# Patient Record
Sex: FEMALE | Race: WHITE | NOT HISPANIC OR LATINO | Employment: FULL TIME | URBAN - METROPOLITAN AREA
[De-identification: names, ages, dates, MRNs, and addresses within clinical notes are randomized per-mention and may not be internally consistent; named-entity substitution may affect disease eponyms.]

---

## 2017-01-03 ENCOUNTER — PATIENT MESSAGE (OUTPATIENT)
Dept: FAMILY MEDICINE | Facility: CLINIC | Age: 52
End: 2017-01-03

## 2017-03-21 RX ORDER — DICYCLOMINE HYDROCHLORIDE 20 MG/1
20 TABLET ORAL 4 TIMES DAILY
Qty: 120 TABLET | Refills: 0 | Status: SHIPPED | OUTPATIENT
Start: 2017-03-21 | End: 2017-07-26 | Stop reason: SDUPTHER

## 2017-03-21 NOTE — TELEPHONE ENCOUNTER
----- Message from Jennifer Teague sent at 3/21/2017 12:39 PM CDT -----  Contact: self  Patient called regarding refill of medication. Stating the pharmacy submitted a request last week with no response. The patient is currently out of meds. Please contact [phone] (home)     BENTYL 20 mg tablet    [facility] [address] [place] [address] AT [facility] [address]  [address] [place] [address]  Phone: [phone] Fax: [phone]

## 2017-03-21 NOTE — TELEPHONE ENCOUNTER
Her pharmacy did not send refill request. This is the first one we have received. Refill sent to pharmacy.

## 2017-05-25 ENCOUNTER — OFFICE VISIT (OUTPATIENT)
Dept: FAMILY MEDICINE | Facility: CLINIC | Age: 52
End: 2017-05-25
Payer: COMMERCIAL

## 2017-05-25 VITALS
RESPIRATION RATE: 18 BRPM | BODY MASS INDEX: 27.85 KG/M2 | DIASTOLIC BLOOD PRESSURE: 60 MMHG | TEMPERATURE: 98 F | SYSTOLIC BLOOD PRESSURE: 98 MMHG | WEIGHT: 163.13 LBS | HEIGHT: 64 IN | HEART RATE: 72 BPM

## 2017-05-25 DIAGNOSIS — J01.00 ACUTE MAXILLARY SINUSITIS, RECURRENCE NOT SPECIFIED: Primary | ICD-10-CM

## 2017-05-25 DIAGNOSIS — K21.9 GASTROESOPHAGEAL REFLUX DISEASE, ESOPHAGITIS PRESENCE NOT SPECIFIED: ICD-10-CM

## 2017-05-25 PROCEDURE — 99214 OFFICE O/P EST MOD 30 MIN: CPT | Mod: S$GLB,,, | Performed by: NURSE PRACTITIONER

## 2017-05-25 RX ORDER — ESOMEPRAZOLE MAGNESIUM 40 MG/1
40 CAPSULE, DELAYED RELEASE ORAL
Qty: 30 CAPSULE | Refills: 6 | Status: SHIPPED | OUTPATIENT
Start: 2017-05-25 | End: 2017-06-20

## 2017-05-25 RX ORDER — AZITHROMYCIN 250 MG/1
TABLET, FILM COATED ORAL
Qty: 6 TABLET | Refills: 0 | Status: SHIPPED | OUTPATIENT
Start: 2017-05-25 | End: 2017-11-22 | Stop reason: ALTCHOICE

## 2017-05-25 NOTE — PROGRESS NOTES
"Subjective:       Patient ID: Lavern Hodges is a 51 y.o. female.    Chief Complaint: No chief complaint on file.    HPI here for symptoms of sinus. Onset over a week. Was using nasoqort and it was feeling better but now symptoms are bad again. Taking advil. See ROS.    She has tried several different GERD medications. She has seen  GI.  She finds that only the nexium  40mg keeps her symptoms controlled. She would like to try and PA from insurance approved. Will send to pharmacy.     The following portion of the patients history was reviewed and updated as appropriate: allergies, current medications, past medical and surgical history. Past social history and problem list reviewed. Family PMH and Past social history reviewed. Tobacco, Illicit drug use reviewed.     Review of Systems   Constitutional: Positive for fatigue and fever (flushing. ). Negative for chills.   HENT: Positive for congestion, postnasal drip, rhinorrhea, sinus pressure, sneezing (started with sneezing, that is better) and sore throat.    Respiratory: Negative for cough, chest tightness and wheezing.    Cardiovascular: Negative for chest pain and palpitations.   Gastrointestinal: Positive for abdominal pain, diarrhea (had diarrhea last week, that is resolved) and nausea. Negative for vomiting.   Musculoskeletal: Positive for myalgias.   Skin: Negative for rash.   Neurological: Negative for dizziness, weakness and headaches.       Objective:     BP 98/60 (BP Location: Left arm, Patient Position: Sitting, BP Method: Manual)   Pulse 72   Temp 98.3 °F (36.8 °C) (Oral)   Resp 18   Ht 5' 4" (1.626 m)   Wt 74 kg (163 lb 2.3 oz)   LMP 05/11/2017   BMI 28.00 kg/m²      Physical Exam  Constitutional:  well-developed and well-nourished. Oriented to Person, place and time.  Head: Normocephalic.   Ears: Canals without erythema or cerumen impactions. Mild air and /fluid levels. No bulging bilaterally.  Nose: Rhinorrhea and sinus tenderness present over " maxillary sinus area.   Mouth/Throat: Uvula is midline and mucous membranes are normal. Posterior oropharyngeal erythema present. PND to posterior pharynx.   Eyes: Conjunctivae are normal. Pupils are equal, round, and reactive to light.   Neck: Normal range of motion. Neck supple. mild inflammation and tenderness to anterior cervical lymph nodes  Cardiovascular: Normal rate and regular rhythm.  No murmurs or gallops.   Pulmonary/Chest: Effort normal and breath sounds normal.  no wheezing or rales.   Musculoskeletal: Normal range of motion. gait and coordination normal.   Assessment:       1. Acute maxillary sinusitis, recurrence not specified    2. Gastroesophageal reflux disease, esophagitis presence not specified        Plan:         Lavern was seen today for sinusitis and sore throat.    Diagnoses and all orders for this visit:    Acute maxillary sinusitis, recurrence not specified: due to duration, reoccurance and presenting exam will cover with antibiotics. Take as directed. Needs to use nasaqort and claritin daily.    Gastroesophageal reflux disease, esophagitis presence not specified  -     esomeprazole (NEXIUM) 40 MG capsule; Take 1 capsule (40 mg total) by mouth before breakfast. Brand name only    Other orders  -     azithromycin (ZITHROMAX Z-ANJEL) 250 MG tablet; Two tablets today then one daily until complete    Continue current medication  Take medications only as prescribed  Healthy diet  Adequate rest  Adequate hydration  Avoid allergens  Avoid excessive caffeine

## 2017-05-30 ENCOUNTER — TELEPHONE (OUTPATIENT)
Dept: FAMILY MEDICINE | Facility: CLINIC | Age: 52
End: 2017-05-30

## 2017-05-30 NOTE — TELEPHONE ENCOUNTER
Received from [facility] message that the pt's ins plan does not cover Nexium.   Alternative med is Pantoprazole.

## 2017-05-31 NOTE — TELEPHONE ENCOUNTER
Called insurance to Initiat PA for rx Nexium 40 mg.   Per insurance the nexium does not need priro auth at this time, not on formulary, not even for PA.  Alternative pantoprazole.  [facility] informed.  PharmacistBelem, also trying to  Run the generic or She can fill the Pantoprazole?

## 2017-06-01 ENCOUNTER — TELEPHONE (OUTPATIENT)
Dept: FAMILY MEDICINE | Facility: CLINIC | Age: 52
End: 2017-06-01

## 2017-06-01 NOTE — TELEPHONE ENCOUNTER
Spoke w/ pt, has been taking OTC Nexium 20 mg 2 tablets. Pt aware that insurance will not cover brand name Nexium, Co-pay for generic Nexium is $ 55. Pt states that she was told that if she tried and failed on 2 other rx , they would cover Nexium. Pt has tried and failed on Prevacid and Protonix . [facility] to fax PA info on Brand name Nexium. Pt aware that PA process takes 24-72 hrs--lp

## 2017-06-01 NOTE — TELEPHONE ENCOUNTER
Called pt, no answer. Left message on voicemail with clinic # to call back in regards to her pa for nexium.

## 2017-06-01 NOTE — TELEPHONE ENCOUNTER
----- Message from Zac Sherman sent at 5/31/2017  4:18 PM CDT -----  Contact: belem with [facility]  Belem with [facility] called to advise that script request was written for brand only and pharmacist can't process it for generic. Please call back at [phone]. thanks,

## 2017-06-20 RX ORDER — CLONAZEPAM 0.5 MG/1
0.5 TABLET ORAL NIGHTLY PRN
Qty: 20 TABLET | Refills: 2 | Status: SHIPPED | OUTPATIENT
Start: 2017-06-20 | End: 2019-07-08 | Stop reason: ALTCHOICE

## 2017-06-20 RX ORDER — OMEPRAZOLE 40 MG/1
40 CAPSULE, DELAYED RELEASE ORAL DAILY
Qty: 30 CAPSULE | Refills: 11 | Status: SHIPPED | OUTPATIENT
Start: 2017-06-20 | End: 2017-11-22

## 2017-06-20 RX ORDER — VALACYCLOVIR HYDROCHLORIDE 500 MG/1
500 TABLET, FILM COATED ORAL 2 TIMES DAILY
Qty: 20 TABLET | Refills: 11 | Status: SHIPPED | OUTPATIENT
Start: 2017-06-20 | End: 2019-07-10 | Stop reason: SDUPTHER

## 2017-06-20 NOTE — TELEPHONE ENCOUNTER
Pt stated she has a blister on mouth and itches,  Requesting something to be called in to pharmacy.   Pt stated she thinks this is related to stress.  Requesting also refill on Klonopin.   3rd pt stated the aciphex and upsets her stomach.  She is questioning if you can call in alternative to Nexium(ins not covering), questioning prilosec.

## 2017-06-20 NOTE — TELEPHONE ENCOUNTER
----- Message from Mi Falk sent at 2017 12:57 PM CDT -----  Contact: Lavern  Patient is requesting refill Rx Klonopin (Rx ) and also asking for a Rx for mouth sores    [facility] [address] [place] [id number] [facility] [address] AT [facility] [place] [address]  [id number] [place] [address]  Phone: [phone] Fax: [phone]

## 2017-07-13 ENCOUNTER — TELEPHONE (OUTPATIENT)
Dept: FAMILY MEDICINE | Facility: CLINIC | Age: 52
End: 2017-07-13

## 2017-07-13 NOTE — TELEPHONE ENCOUNTER
----- Message from Alycia Santizo sent at 7/13/2017 12:41 PM CDT -----  Contact: self  Patient called asking for advice about stomach pain and her medication.  Please call patient at [phone]. Thanks!

## 2017-07-14 ENCOUNTER — TELEPHONE (OUTPATIENT)
Dept: FAMILY MEDICINE | Facility: CLINIC | Age: 52
End: 2017-07-14

## 2017-07-14 NOTE — TELEPHONE ENCOUNTER
Spoke to pt.   She has complaints of stomach issues, does have medication for IBS.  Pt stated she has jury duty Monday and that is stressing her out, making IBS worse, stomach cramping.    Pt requesting letter to give at Jury duty to excuse her.    Per Brandyn Webb verbal, no jury duty letters given unless pt is disabled.  Pt verbalized understanding.

## 2017-07-14 NOTE — TELEPHONE ENCOUNTER
----- Message from Brenda Falk sent at 7/14/2017 10:52 AM CDT -----  Contact: PAtient  Patient is returning your call. Please call her at [phone].

## 2017-07-14 NOTE — TELEPHONE ENCOUNTER
----- Message from Joselin Chong sent at 7/14/2017 12:34 PM CDT -----  Pt returning call / call [phone]

## 2017-07-27 RX ORDER — DICYCLOMINE HYDROCHLORIDE 20 MG/1
TABLET ORAL
Qty: 120 TABLET | Refills: 0 | Status: SHIPPED | OUTPATIENT
Start: 2017-07-27 | End: 2017-11-22 | Stop reason: SDUPTHER

## 2017-10-31 ENCOUNTER — TELEPHONE (OUTPATIENT)
Dept: FAMILY MEDICINE | Facility: CLINIC | Age: 52
End: 2017-10-31

## 2017-10-31 NOTE — TELEPHONE ENCOUNTER
----- Message from José Miguel Ibanez sent at 10/31/2017  3:17 PM CDT -----  Contact: Patient  Patient states that she would like to come in the morning, sometime this week, for a Flu Shot and to please call her back at [phone].  Thank you

## 2017-11-03 ENCOUNTER — IMMUNIZATION (OUTPATIENT)
Dept: FAMILY MEDICINE | Facility: CLINIC | Age: 52
End: 2017-11-03
Payer: COMMERCIAL

## 2017-11-03 PROCEDURE — 90471 IMMUNIZATION ADMIN: CPT | Mod: S$GLB,,, | Performed by: NURSE PRACTITIONER

## 2017-11-03 PROCEDURE — 90686 IIV4 VACC NO PRSV 0.5 ML IM: CPT | Mod: S$GLB,,, | Performed by: NURSE PRACTITIONER

## 2017-11-22 ENCOUNTER — HOSPITAL ENCOUNTER (OUTPATIENT)
Dept: RADIOLOGY | Facility: HOSPITAL | Age: 52
Discharge: HOME | End: 2017-11-22
Attending: NURSE PRACTITIONER
Payer: COMMERCIAL

## 2017-11-22 ENCOUNTER — OFFICE VISIT (OUTPATIENT)
Dept: FAMILY MEDICINE | Facility: CLINIC | Age: 52
End: 2017-11-22
Payer: COMMERCIAL

## 2017-11-22 VITALS
DIASTOLIC BLOOD PRESSURE: 70 MMHG | HEART RATE: 84 BPM | TEMPERATURE: 98 F | BODY MASS INDEX: 28 KG/M2 | SYSTOLIC BLOOD PRESSURE: 132 MMHG | HEIGHT: 64 IN | WEIGHT: 164 LBS

## 2017-11-22 DIAGNOSIS — M54.2 NECK PAIN: ICD-10-CM
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
## 2017-11-22 DIAGNOSIS — S46.812A STRAIN OF LEFT TRAPEZIUS MUSCLE, INITIAL ENCOUNTER: ICD-10-CM

## 2017-11-22 DIAGNOSIS — R29.898 UPPER EXTREMITY WEAKNESS: ICD-10-CM

## 2017-11-22 PROCEDURE — 99214 OFFICE O/P EST MOD 30 MIN: CPT | Mod: S$GLB,,, | Performed by: NURSE PRACTITIONER

## 2017-11-22 PROCEDURE — 72040 X-RAY EXAM NECK SPINE 2-3 VW: CPT | Mod: 26,,, | Performed by: RADIOLOGY

## 2017-11-22 PROCEDURE — 72040 X-RAY EXAM NECK SPINE 2-3 VW: CPT | Mod: TC,PO

## 2017-11-22 RX ORDER — HYDROGEN PEROXIDE 3 %
20 SOLUTION, NON-ORAL MISCELLANEOUS
COMMUNITY
End: 2018-05-24 | Stop reason: SDUPTHER

## 2017-11-22 RX ORDER — METHOCARBAMOL 500 MG/1
500 TABLET, FILM COATED ORAL 3 TIMES DAILY
Qty: 30 TABLET | Refills: 0 | Status: SHIPPED | OUTPATIENT
Start: 2017-11-22 | End: 2017-12-02

## 2017-11-22 RX ORDER — RABEPRAZOLE SODIUM 20 MG/1
TABLET, DELAYED RELEASE ORAL
Refills: 1 | COMMUNITY
Start: 2017-10-30 | End: 2017-11-22

## 2017-11-22 RX ORDER — DICYCLOMINE HYDROCHLORIDE 20 MG/1
TABLET ORAL
Qty: 120 TABLET | Refills: 0 | Status: SHIPPED | OUTPATIENT
Start: 2017-11-22 | End: 2018-04-05 | Stop reason: SDUPTHER

## 2017-11-22 RX ORDER — KETOROLAC TROMETHAMINE 10 MG/1
10 TABLET, FILM COATED ORAL 3 TIMES DAILY
Qty: 15 TABLET | Refills: 0 | Status: SHIPPED | OUTPATIENT
Start: 2017-11-22 | End: 2017-11-27
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
## 2017-12-05 ENCOUNTER — TELEPHONE (OUTPATIENT)
Dept: FAMILY MEDICINE | Facility: CLINIC | Age: 52
End: 2017-12-05

## 2017-12-05 RX ORDER — AZITHROMYCIN 250 MG/1
TABLET, FILM COATED ORAL
Qty: 6 TABLET | Refills: 0 | Status: SHIPPED | OUTPATIENT
Start: 2017-12-05 | End: 2017-12-10 | Stop reason: ALTCHOICE

## 2017-12-06 ENCOUNTER — TELEPHONE (OUTPATIENT)
Dept: FAMILY MEDICINE | Facility: CLINIC | Age: 52
End: 2017-12-06

## 2017-12-06 RX ORDER — ALBUTEROL SULFATE 90 UG/1
2 AEROSOL, METERED RESPIRATORY (INHALATION) EVERY 6 HOURS PRN
Qty: 18 G | Refills: 0 | Status: SHIPPED | OUTPATIENT
Start: 2017-12-06 | End: 2018-12-24

## 2017-12-06 NOTE — TELEPHONE ENCOUNTER
----- Message from Hang JAIMES Lubna sent at 12/6/2017  8:17 AM CST -----  Contact: same  Patient called in and wanted to see if her Rx for Albuterol Inhaler.  Patient stated that she has not had to use it in awhile but wanted to see if she could get a Rx sent over to the following pharmacy.      [facility] [address] [place] [address] [id number] [place] [address] AT [facility] [address]  [address] [place] [id number]  Phone: [phone] Fax: [phone]    Patient call back number is [phone]

## 2017-12-10 ENCOUNTER — OFFICE VISIT (OUTPATIENT)
Dept: URGENT CARE | Facility: CLINIC | Age: 52
End: 2017-12-10
Payer: COMMERCIAL

## 2017-12-10 VITALS
HEIGHT: 64 IN | DIASTOLIC BLOOD PRESSURE: 78 MMHG | SYSTOLIC BLOOD PRESSURE: 120 MMHG | RESPIRATION RATE: 16 BRPM | HEART RATE: 94 BPM | TEMPERATURE: 99 F | WEIGHT: 159 LBS | OXYGEN SATURATION: 98 % | BODY MASS INDEX: 27.14 KG/M2

## 2017-12-10 DIAGNOSIS — J32.9 RHINOSINUSITIS: Primary | ICD-10-CM

## 2017-12-10 PROCEDURE — 99213 OFFICE O/P EST LOW 20 MIN: CPT | Mod: 25,S$GLB,, | Performed by: FAMILY MEDICINE

## 2017-12-10 PROCEDURE — 96372 THER/PROPH/DIAG INJ SC/IM: CPT | Mod: S$GLB,,, | Performed by: FAMILY MEDICINE

## 2017-12-10 RX ORDER — BETAMETHASONE SODIUM PHOSPHATE AND BETAMETHASONE ACETATE 3; 3 MG/ML; MG/ML
9 INJECTION, SUSPENSION INTRA-ARTICULAR; INTRALESIONAL; INTRAMUSCULAR; SOFT TISSUE
Status: COMPLETED | OUTPATIENT
Start: 2017-12-10 | End: 2017-12-10

## 2017-12-10 RX ORDER — GUAIFENESIN 100 MG/5ML
200 SOLUTION ORAL 3 TIMES DAILY PRN
COMMUNITY
End: 2017-12-10 | Stop reason: ALTCHOICE

## 2017-12-10 RX ADMIN — BETAMETHASONE SODIUM PHOSPHATE AND BETAMETHASONE ACETATE 9 MG: 3; 3 INJECTION, SUSPENSION INTRA-ARTICULAR; INTRALESIONAL; INTRAMUSCULAR; SOFT TISSUE at 11:12

## 2017-12-10 NOTE — PATIENT INSTRUCTIONS
-   If no improvement, worsening, and/or persistent temp > 100.4, seek medical attention or follow-up with PCP.  -   Tylenol/Ibuprofen as needed  -   Mucinex-D as needed

## 2017-12-10 NOTE — PROGRESS NOTES
"Subjective:       Patient ID: Lavern Hodges is a 52 y.o. female.    Vitals:  height is 5' 4" (1.626 m) and weight is 72.1 kg (159 lb). Her temperature is 98.7 °F (37.1 °C). Her blood pressure is 120/78 and her pulse is 94. Her respiration is 16 and oxygen saturation is 98%.     Chief Complaint: Nasal Congestion and Cough    Cough   This is a new problem. The current episode started in the past 7 days. The problem has been gradually worsening. Associated symptoms include nasal congestion. Pertinent negatives include no chest pain, chills, ear pain, eye redness, fever, headaches, myalgias, sore throat, shortness of breath or wheezing.     Review of Systems   Constitution: Negative for chills, fever and malaise/fatigue.   HENT: Positive for congestion. Negative for ear pain, hoarse voice and sore throat.    Eyes: Negative for discharge and redness.   Cardiovascular: Negative for chest pain, dyspnea on exertion and leg swelling.   Respiratory: Positive for cough. Negative for shortness of breath, sputum production and wheezing.    Musculoskeletal: Negative for myalgias.   Gastrointestinal: Positive for diarrhea. Negative for abdominal pain and nausea.   Neurological: Negative for headaches.       Objective:      Physical Exam   Constitutional: She appears well-developed and well-nourished. She is cooperative.  Non-toxic appearance. She does not appear ill. No distress.   HENT:   Head: Normocephalic and atraumatic.   Right Ear: Hearing, tympanic membrane, external ear and ear canal normal.   Left Ear: Hearing, tympanic membrane, external ear and ear canal normal.   Nose: Mucosal edema present. No rhinorrhea or nasal deformity. No epistaxis. Right sinus exhibits no maxillary sinus tenderness and no frontal sinus tenderness. Left sinus exhibits no maxillary sinus tenderness and no frontal sinus tenderness.   Mouth/Throat: Uvula is midline, oropharynx is clear and moist and mucous membranes are normal. No trismus in the jaw. " Normal dentition. No uvula swelling. No posterior oropharyngeal erythema.   Eyes: Conjunctivae and lids are normal. No scleral icterus.   Sclera clear bilat   Neck: Trachea normal, full passive range of motion without pain and phonation normal. Neck supple.   Cardiovascular: Normal rate, regular rhythm, normal heart sounds, intact distal pulses and normal pulses.    Pulmonary/Chest: Effort normal and breath sounds normal. No respiratory distress.   Abdominal: Normal appearance.   Neurological: She is alert. She exhibits normal muscle tone.   Skin: Skin is warm and intact. She is not diaphoretic.   Psychiatric: She has a normal mood and affect. Her speech is normal and behavior is normal. Cognition and memory are normal.   Nursing note and vitals reviewed.      Assessment:       1. Rhinosinusitis        Plan:         Rhinosinusitis  -     betamethasone acetate-betamethasone sodium phosphate injection 9 mg; Inject 1.5 mLs (9 mg total) into the muscle one time.        -      Mucinex-D PRN

## 2017-12-14 ENCOUNTER — OFFICE VISIT (OUTPATIENT)
Dept: FAMILY MEDICINE | Facility: CLINIC | Age: 52
End: 2017-12-14
Payer: COMMERCIAL

## 2017-12-14 ENCOUNTER — TELEPHONE (OUTPATIENT)
Dept: FAMILY MEDICINE | Facility: CLINIC | Age: 52
End: 2017-12-14

## 2017-12-14 ENCOUNTER — LAB VISIT (OUTPATIENT)
Dept: LAB | Facility: HOSPITAL | Age: 52
End: 2017-12-14
Attending: NURSE PRACTITIONER
Payer: COMMERCIAL

## 2017-12-14 VITALS
RESPIRATION RATE: 16 BRPM | HEART RATE: 85 BPM | OXYGEN SATURATION: 98 % | BODY MASS INDEX: 28.07 KG/M2 | TEMPERATURE: 98 F | SYSTOLIC BLOOD PRESSURE: 108 MMHG | WEIGHT: 164.44 LBS | DIASTOLIC BLOOD PRESSURE: 70 MMHG | HEIGHT: 64 IN

## 2017-12-14 DIAGNOSIS — J01.01 ACUTE RECURRENT MAXILLARY SINUSITIS: Primary | ICD-10-CM

## 2017-12-14 DIAGNOSIS — D50.9 IRON DEFICIENCY ANEMIA, UNSPECIFIED IRON DEFICIENCY ANEMIA TYPE: ICD-10-CM

## 2017-12-14 DIAGNOSIS — R53.83 FATIGUE, UNSPECIFIED TYPE: ICD-10-CM

## 2017-12-14 DIAGNOSIS — J20.9 BRONCHITIS WITH BRONCHOSPASM: ICD-10-CM

## 2017-12-14 LAB
FERRITIN SERPL-MCNC: 32 NG/ML
IRON SERPL-MCNC: 64 UG/DL
IRON SERPL-MCNC: 64 UG/DL
SATURATED IRON: 15 %
TOTAL IRON BINDING CAPACITY: 414 UG/DL
TRANSFERRIN SERPL-MCNC: 280 MG/DL
TSH SERPL DL<=0.005 MIU/L-ACNC: 1.01 UIU/ML
VIT B12 SERPL-MCNC: 259 PG/ML

## 2017-12-14 PROCEDURE — 99214 OFFICE O/P EST MOD 30 MIN: CPT | Mod: S$GLB,,, | Performed by: NURSE PRACTITIONER

## 2017-12-14 PROCEDURE — 82607 VITAMIN B-12: CPT

## 2017-12-14 PROCEDURE — 83540 ASSAY OF IRON: CPT

## 2017-12-14 PROCEDURE — 36415 COLL VENOUS BLD VENIPUNCTURE: CPT | Mod: PO

## 2017-12-14 PROCEDURE — 84443 ASSAY THYROID STIM HORMONE: CPT

## 2017-12-14 PROCEDURE — 82728 ASSAY OF FERRITIN: CPT

## 2017-12-14 RX ORDER — PREDNISONE 20 MG/1
TABLET ORAL
Qty: 11 TABLET | Refills: 0 | Status: SHIPPED | OUTPATIENT
Start: 2017-12-14 | End: 2018-05-01

## 2017-12-14 RX ORDER — LEVOFLOXACIN 500 MG/1
500 TABLET, FILM COATED ORAL DAILY
Qty: 10 TABLET | Refills: 0 | Status: SHIPPED | OUTPATIENT
Start: 2017-12-14 | End: 2017-12-24

## 2017-12-14 RX ORDER — PREDNISONE 20 MG/1
TABLET ORAL
Qty: 11 TABLET | Refills: 0 | Status: SHIPPED | OUTPATIENT
Start: 2017-12-14 | End: 2017-12-14 | Stop reason: SDUPTHER
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
## 2017-12-14 NOTE — TELEPHONE ENCOUNTER
----- Message from Marcia Gonzalez sent at 12/14/2017 10:25 AM CST -----  Verification on Rx Prednisone 20 mg.  Please call [facility]/ at [phone]

## 2017-12-14 NOTE — TELEPHONE ENCOUNTER
Per pharmacist (Sissy), please clarify how many tablets patient takes on days 5-6.  Script reads: 2 tablets x4 days, ???  x2 days, then ½ tab x2 days.

## 2017-12-19 ENCOUNTER — TELEPHONE (OUTPATIENT)
Dept: FAMILY MEDICINE | Facility: CLINIC | Age: 52
End: 2017-12-19

## 2017-12-19 NOTE — TELEPHONE ENCOUNTER
Called pt; no answer. Left message on machine with clinic call back # for return call regarding recent lab results.

## 2017-12-19 NOTE — TELEPHONE ENCOUNTER
----- Message from Lisha Prabhakar sent at 12/19/2017 12:59 PM CST -----  Patient is returning nurse's (Sol) call/please call patient back at [phone].

## 2017-12-19 NOTE — TELEPHONE ENCOUNTER
----- Message from Gerda Webb NP sent at 12/15/2017  4:14 PM CST -----  Saturated iron is a little better at 15.  Ferritin is in normal range at 32.  That is better.

## 2018-01-09 ENCOUNTER — TELEPHONE (OUTPATIENT)
Dept: FAMILY MEDICINE | Facility: CLINIC | Age: 53
End: 2018-01-09
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
## 2018-01-10 ENCOUNTER — TELEPHONE (OUTPATIENT)
Dept: FAMILY MEDICINE | Facility: CLINIC | Age: 53
End: 2018-01-10

## 2018-01-10 NOTE — TELEPHONE ENCOUNTER
----- Message from Peter Barkley sent at 1/9/2018  1:06 PM CST -----  Contact: self  [phone]  Patient returning the nurse phone call.Thanks!

## 2018-01-11 NOTE — TELEPHONE ENCOUNTER
Sounds like a tendonitis of the elbow. Avoid lifting anything heavier than 5 lbs. Can buy the tennis elbow bands at the pharmacy to wear. Can take the medication she has at home. Apply ice, not heat. If not improving then will refer  Her to Orthopedics for evaluation.
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
## 2018-01-19 DIAGNOSIS — M25.521 RIGHT ELBOW PAIN: Primary | ICD-10-CM

## 2018-01-26 ENCOUNTER — OFFICE VISIT (OUTPATIENT)
Dept: ORTHOPEDICS | Facility: CLINIC | Age: 53
End: 2018-01-26
Payer: COMMERCIAL

## 2018-01-26 ENCOUNTER — HOSPITAL ENCOUNTER (OUTPATIENT)
Dept: RADIOLOGY | Facility: HOSPITAL | Age: 53
Discharge: HOME | End: 2018-01-26
Attending: ORTHOPAEDIC SURGERY
Payer: COMMERCIAL

## 2018-01-26 VITALS — WEIGHT: 164 LBS | HEIGHT: 64 IN | BODY MASS INDEX: 28 KG/M2
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
## 2018-01-26 DIAGNOSIS — M25.521 RIGHT ELBOW PAIN: ICD-10-CM

## 2018-01-26 DIAGNOSIS — M25.521 RIGHT ELBOW PAIN: Primary | ICD-10-CM

## 2018-01-26 PROCEDURE — 20605 DRAIN/INJ JOINT/BURSA W/O US: CPT | Mod: RT,S$GLB,, | Performed by: ORTHOPAEDIC SURGERY

## 2018-01-26 PROCEDURE — 99204 OFFICE O/P NEW MOD 45 MIN: CPT | Mod: 25,S$GLB,, | Performed by: ORTHOPAEDIC SURGERY

## 2018-01-26 PROCEDURE — 73080 X-RAY EXAM OF ELBOW: CPT | Mod: TC,PO,RT

## 2018-01-26 PROCEDURE — 73080 X-RAY EXAM OF ELBOW: CPT | Mod: 26,RT,, | Performed by: RADIOLOGY

## 2018-01-26 PROCEDURE — 97760 ORTHOTIC MGMT&TRAING 1ST ENC: CPT | Mod: S$GLB,,, | Performed by: ORTHOPAEDIC SURGERY

## 2018-01-26 PROCEDURE — 99999 PR PBB SHADOW E&M-EST. PATIENT-LVL II: CPT | Mod: PBBFAC,,, | Performed by: ORTHOPAEDIC SURGERY

## 2018-01-26 NOTE — PROCEDURES
R lateral epicondyleIntermediate Joint Aspiration/Injection  Date/Time: 1/26/2018 1:47 PM  Performed by: RAHEEL MAGAÑA  Authorized by: RAHEEL MAGAÑA     Consent Done?: Yes (Verbal)  Indications:  Pain    Location:  Elbow  Needle size:  25 G  Medications:  40 mg triamcinolone hexacetonide 20 mg/mL  Patient tolerance:  Patient tolerated the procedure well with no immediate complications
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
## 2018-04-06 RX ORDER — DICYCLOMINE HYDROCHLORIDE 20 MG/1
TABLET ORAL
Qty: 120 TABLET | Refills: 0 | Status: SHIPPED | OUTPATIENT
Start: 2018-04-06 | End: 2018-08-22 | Stop reason: SDUPTHER

## 2018-04-16 LAB
HPV 16/18: NEGATIVE
HPV OTHER HR TYPES: NEGATIVE
PAP SMEAR: NORMAL

## 2018-05-01 ENCOUNTER — OFFICE VISIT (OUTPATIENT)
Dept: FAMILY MEDICINE | Facility: CLINIC | Age: 53
End: 2018-05-01
Payer: COMMERCIAL

## 2018-05-01 VITALS
OXYGEN SATURATION: 98 % | TEMPERATURE: 98 F | WEIGHT: 166.44 LBS | HEIGHT: 64 IN | DIASTOLIC BLOOD PRESSURE: 70 MMHG | HEART RATE: 102 BPM | SYSTOLIC BLOOD PRESSURE: 108 MMHG | BODY MASS INDEX: 28.42 KG/M2

## 2018-05-01 DIAGNOSIS — S46.811A STRAIN OF RIGHT TRAPEZIUS MUSCLE, INITIAL ENCOUNTER: ICD-10-CM

## 2018-05-01 DIAGNOSIS — M77.8 RIGHT ELBOW TENDINITIS: ICD-10-CM

## 2018-05-01 DIAGNOSIS — M25.511 CHRONIC RIGHT SHOULDER PAIN: Primary | ICD-10-CM

## 2018-05-01 DIAGNOSIS — G89.29 CHRONIC RIGHT SHOULDER PAIN: Primary | ICD-10-CM

## 2018-05-01 PROCEDURE — 99214 OFFICE O/P EST MOD 30 MIN: CPT | Mod: S$GLB,,, | Performed by: NURSE PRACTITIONER

## 2018-05-01 PROCEDURE — 3008F BODY MASS INDEX DOCD: CPT | Mod: CPTII,S$GLB,, | Performed by: NURSE PRACTITIONER

## 2018-05-01 RX ORDER — CYCLOBENZAPRINE HCL 10 MG
10 TABLET ORAL 3 TIMES DAILY PRN
Qty: 30 TABLET | Refills: 0 | Status: SHIPPED | OUTPATIENT
Start: 2018-05-01 | End: 2018-05-11

## 2018-05-01 NOTE — PROGRESS NOTES
"Subjective:       Patient ID: Lavern Hodges is a 52 y.o. female.    Chief Complaint: Arm Pain (right)    HPI right arm pain that goes from the trapezius muscle to the hand. States achy type pain. She was seen by Orthopedics for pain in her right elbow. That is a little better.  She can lift her arm but it hurts. States it really hurts to try and pick anything up. Cannot lay on her left side to sleep. Has used ice over the shoulder. States it got really bad over the weekend. Ibuprofen helps a little. She is right handed so this makes it difficult for ADLs.  She would like to follow up with Orthopedics. No other concerns. See ROS    The following portion of the patients history was reviewed and updated as appropriate: allergies, current medications, past medical and surgical history. Past social history and problem list reviewed. Family PMH and Past social history reviewed. Tobacco, Illicit drug use reviewed.     Review of Systems  Constitutional: No fatigue or fever    Respiratory:   No SOB, Wheezing, cough  Cardiovascular:   No CP, Palpitations  Gastrointestinal:   No N/V/D. No abdominal pain, weight stable. Appetite good.   Musculoskeletal:     No change in gait or coordination. Right shoulder painful. Limited ROM. See HPI  Neurological:   No dizziness. No headaches  Hematological: No abnormal bruising or bleeding      Objective:     /70 (BP Location: Left arm, Patient Position: Sitting, BP Method: Medium (Manual))   Pulse 102   Temp 98.3 °F (36.8 °C) (Oral)   Ht 5' 4" (1.626 m)   Wt 75.5 kg (166 lb 7.2 oz)   LMP 04/21/2018   SpO2 98%   BMI 28.57 kg/m²      Physical Exam     Constitutional: oriented to person, place, and time. well-developed and well-nourished.   Cardiovascular: Normal rate, regular rhythm and normal heart sounds.    Pulmonary/Chest: Effort normal and breath sounds normal. No respiratory distress. No wheezes.   Musculoskeletal: discomfort with raising right arm.  strong, equal. " Sensation intact. Tenderness to the right trapezius area. No pain with palpation over the shoulder joint. some tenderness to the right lateral epicondyle.    Neurological: oriented to person, place, and time.   Skin: Skin is warm and dry. No rashes or lesions  Psychiatric: Normal mood and affect.Behavior is normal. Judgment and thought content normal.   Assessment:       1. Chronic right shoulder pain    2. Strain of right trapezius muscle, initial encounter    3. Right elbow tendinitis        Plan:         Lavern was seen today for arm pain.    Diagnoses and all orders for this visit:    Chronic right shoulder pain: will get shoulder xray. Refer to Orthopedics for follow up.  -     X-ray Shoulder 2 or More Views Right; Future  -     Ambulatory referral to Orthopedics    Strain of right trapezius muscle, initial encounter: moist heat. Will give muscle relaxer. Take motrin BID.    Right elbow tendinitis    Other orders  -     cyclobenzaprine (FLEXERIL) 10 MG tablet; Take 1 tablet (10 mg total) by mouth 3 (three) times daily as needed.    Continue current medication  Take medications only as prescribed  Healthy diet, exercise  Adequate rest  Adequate hydration  Avoid allergens  Avoid excessive caffeine

## 2018-05-03 ENCOUNTER — OFFICE VISIT (OUTPATIENT)
Dept: ORTHOPEDICS | Facility: CLINIC | Age: 53
End: 2018-05-03
Payer: COMMERCIAL

## 2018-05-03 ENCOUNTER — HOSPITAL ENCOUNTER (OUTPATIENT)
Dept: RADIOLOGY | Facility: HOSPITAL | Age: 53
Discharge: HOME | End: 2018-05-03
Attending: NURSE PRACTITIONER
Payer: COMMERCIAL

## 2018-05-03 ENCOUNTER — PATIENT MESSAGE (OUTPATIENT)
Dept: FAMILY MEDICINE | Facility: CLINIC | Age: 53
End: 2018-05-03

## 2018-05-03 VITALS — HEIGHT: 64 IN | BODY MASS INDEX: 28.34 KG/M2 | WEIGHT: 166 LBS

## 2018-05-03 DIAGNOSIS — G89.29 CHRONIC RIGHT SHOULDER PAIN: ICD-10-CM

## 2018-05-03 DIAGNOSIS — M25.511 CHRONIC RIGHT SHOULDER PAIN: ICD-10-CM

## 2018-05-03 DIAGNOSIS — M54.12 CERVICAL RADICULOPATHY: Primary | ICD-10-CM

## 2018-05-03 PROCEDURE — 99213 OFFICE O/P EST LOW 20 MIN: CPT | Mod: S$GLB,,, | Performed by: NURSE PRACTITIONER

## 2018-05-03 PROCEDURE — 3008F BODY MASS INDEX DOCD: CPT | Mod: CPTII,S$GLB,, | Performed by: NURSE PRACTITIONER

## 2018-05-03 PROCEDURE — 73030 X-RAY EXAM OF SHOULDER: CPT | Mod: TC,PO,RT

## 2018-05-03 PROCEDURE — 99999 PR PBB SHADOW E&M-EST. PATIENT-LVL II: CPT | Mod: PBBFAC,,, | Performed by: NURSE PRACTITIONER

## 2018-05-03 PROCEDURE — 73030 X-RAY EXAM OF SHOULDER: CPT | Mod: 26,RT,, | Performed by: RADIOLOGY
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
## 2018-05-03 NOTE — LETTER
May 3, 2018      Gerda Webb, CHELLE  [address] [address] [address]           [facility]  [address] [facility] [address] [place] [address]  Phone: [phone]          Patient: Lavern Hodges   MR Number: [id number]   YOB: 1965   Date of Visit: 5/3/2018       Dear Gerda Webb:    Thank you for referring Lavern Hodges to me for evaluation. Attached you will find relevant portions of my assessment and plan of care.    If you have questions, please do not hesitate to call me. I look forward to following Lavern Hodges along with you.    Sincerely,    Deborah Gillis, APRN    Enclosure  CC:  No Recipients    If you would like to receive this communication electronically, please contact [email or web address] or [phone] to request more information on [facility] access.    For providers and/or their staff who would like to refer a patient to [facility], please contact us through our one-stop-shop provider referral line, [facility], at [phone].    If you feel you have received this communication in error or would no longer like to receive these types of communications, please e-mail [email or web address]

## 2018-05-24 ENCOUNTER — OFFICE VISIT (OUTPATIENT)
Dept: FAMILY MEDICINE | Facility: CLINIC | Age: 53
End: 2018-05-24
Payer: COMMERCIAL

## 2018-05-24 VITALS
OXYGEN SATURATION: 97 % | HEART RATE: 84 BPM | HEIGHT: 64 IN | SYSTOLIC BLOOD PRESSURE: 110 MMHG | DIASTOLIC BLOOD PRESSURE: 72 MMHG | BODY MASS INDEX: 28.37 KG/M2 | TEMPERATURE: 98 F | WEIGHT: 166.19 LBS

## 2018-05-24 DIAGNOSIS — J30.9 ALLERGIC RHINITIS, UNSPECIFIED SEASONALITY, UNSPECIFIED TRIGGER: ICD-10-CM

## 2018-05-24 DIAGNOSIS — K21.00 GASTROESOPHAGEAL REFLUX DISEASE WITH ESOPHAGITIS: Primary | ICD-10-CM

## 2018-05-24 PROCEDURE — 99213 OFFICE O/P EST LOW 20 MIN: CPT | Mod: S$GLB,,, | Performed by: NURSE PRACTITIONER

## 2018-05-24 PROCEDURE — 3008F BODY MASS INDEX DOCD: CPT | Mod: CPTII,S$GLB,, | Performed by: NURSE PRACTITIONER

## 2018-05-24 RX ORDER — ESOMEPRAZOLE MAGNESIUM 40 MG/1
40 CAPSULE, DELAYED RELEASE ORAL NIGHTLY
Qty: 30 CAPSULE | Refills: 6 | Status: SHIPPED | OUTPATIENT
Start: 2018-05-24 | End: 2018-12-17 | Stop reason: SDUPTHER

## 2018-05-24 NOTE — PATIENT INSTRUCTIONS
GO to [facility] online and sign up. Pick nexium 40mg and pay for month supply then go to [facility] in [place] and pick it up.   The script was sent there

## 2018-05-24 NOTE — PROGRESS NOTES
"Subjective:       Patient ID: Lavern Hodges is a 52 y.o. female.    Chief Complaint: Gastroesophageal Reflux and Sore Throat    HPI patient here with concerns regarding uncontrolled acid reflux.  States she has been taking over-the-counter Nexium that is not helping to control her acid reflux.  Insurance did not want to cover prescription Nexium and she could not afford the cost.  She did have good control on Nexium 40 mg.  She has tried other acid reflux medications without success.  Protonix, zegrid, aciphex, zantac.  She would like to trying get the prescription Nexium again.  She was signed up for [facility] and I will send the medication through them.  She is also having postnasal drip and throat irritation.  This started about a week ago.  No trouble swallowing.  No sinus congestion or pain.  No fever.  Runny nose and sneezing.  She feels this is a combination of postnasal drip and acid reflux causing the sore throat.  She denies any other concerns today.  See review of systems.    Per health maintenance she is due for Pap.  States she had this done 2 months ago.  We will request results the scan into her chart.    Review of Systems   Constitutional: Negative for fatigue and fever.   HENT: Positive for postnasal drip, rhinorrhea, sneezing and sore throat. Negative for congestion, sinus pain, sinus pressure and trouble swallowing.    Eyes: Negative for visual disturbance.   Respiratory: Negative for cough, shortness of breath and wheezing.    Cardiovascular: Negative for chest pain, palpitations and leg swelling.   Gastrointestinal: Negative for abdominal pain, constipation, diarrhea, nausea and vomiting.        Acid reflux, worse when laying down.    Genitourinary: Negative for difficulty urinating.   Musculoskeletal:        Chronic back pain, seeing back clinic today.   Neurological: Negative for dizziness and headaches.       Objective:     /72   Pulse 84   Temp 98.3 °F (36.8 °C) (Oral)   Ht 5' 4" " (1.626 m)   Wt 75.4 kg (166 lb 3.2 oz)   LMP 04/17/2018 (Approximate)   SpO2 97%   BMI 28.53 kg/m²      Physical Exam    Constitutional: oriented to person, place, and time. well-developed and well-nourished.   HENT: normal canals and TM. Throat without erythema or exudate. No sinus area tenderness.  Head: Normocephalic.   Eyes: Conjunctivae are normal. Pupils are equal, round, and reactive to light.   Neck: Normal range of motion. Neck supple. No tracheal deviation present. No thyromegaly present. no enlarged or tender anterior cervical lymph nodes  Cardiovascular: Normal rate, regular rhythm and normal heart sounds.    Pulmonary/Chest: Effort normal and breath sounds normal. No respiratory distress. No wheezes.   Abdominal: Soft. Bowel sounds are normal. No distension. There is no tenderness.   Musculoskeletal: Normal range of motion.gait and coordination normal.   Neurological: oriented to person, place, and time.   Skin: Skin is warm and dry. No rashes or lesions  Psychiatric: Normal mood and affect.Behavior is normal. Judgment and thought content normal.   Assessment:       1. Gastroesophageal reflux disease with esophagitis    2. Allergic rhinitis, unspecified seasonality, unspecified trigger        Plan:         Lavern was seen today for gastroesophageal reflux and sore throat.    Diagnoses and all orders for this visit:    Gastroesophageal reflux disease with esophagitis: will give nexium 40mg tablets.     Allergic rhinitis, unspecified seasonality, unspecified trigger: continue daily allergy medications. No indication of infection. No need for antibiotics at this time.    Other orders  -     esomeprazole (NEXIUM) 40 MG capsule; Take 1 capsule (40 mg total) by mouth every evening.    Continue current medication  Take medications only as prescribed  Healthy diet, exercise  Adequate rest  Adequate hydration  Avoid allergens  Avoid excessive caffeine

## 2018-05-28 ENCOUNTER — TELEPHONE (OUTPATIENT)
Dept: SPINE | Facility: CLINIC | Age: 53
End: 2018-05-28

## 2018-05-28 NOTE — TELEPHONE ENCOUNTER
I called and offered her the 10:30am appointment for today, but she wasn't at home and said she could not make it to the appointment in time. She will call back to reschedule after she looks at her work schedule.

## 2018-05-28 NOTE — TELEPHONE ENCOUNTER
----- Message from Kerwin Moran sent at 5/28/2018  8:09 AM CDT -----  Contact: Patient  Type:  Same Day Appointment Request    Caller is requesting a same day appointment.  Caller declined first available appointment listed below.      Name of Caller:  Lavern  When is the first available appointment?  5/30 @ 2:30  Symptoms:  Neck pain  Best Call Back Number:  [phone]  Additional Information:   Advised that the schedule was booked for today. She asked if she can be put on a wait list for today, if there's any cancellations.

## 2018-06-01 ENCOUNTER — TELEPHONE (OUTPATIENT)
Dept: ADMINISTRATIVE | Facility: HOSPITAL | Age: 53
End: 2018-06-01

## 2018-06-14 ENCOUNTER — OFFICE VISIT (OUTPATIENT)
Dept: SPINE | Facility: CLINIC | Age: 53
End: 2018-06-14
Payer: COMMERCIAL

## 2018-06-14 VITALS
WEIGHT: 167.69 LBS | DIASTOLIC BLOOD PRESSURE: 68 MMHG | BODY MASS INDEX: 28.63 KG/M2 | SYSTOLIC BLOOD PRESSURE: 106 MMHG | HEIGHT: 64 IN | HEART RATE: 84 BPM

## 2018-06-14 DIAGNOSIS — M54.2 CERVICALGIA: ICD-10-CM

## 2018-06-14 DIAGNOSIS — G89.29 CHRONIC MIDLINE LOW BACK PAIN WITHOUT SCIATICA: Primary | ICD-10-CM

## 2018-06-14 DIAGNOSIS — M47.22 OSTEOARTHRITIS OF SPINE WITH RADICULOPATHY, CERVICAL REGION: ICD-10-CM

## 2018-06-14 DIAGNOSIS — M54.12 CERVICAL RADICULOPATHY: ICD-10-CM

## 2018-06-14 DIAGNOSIS — M54.50 CHRONIC MIDLINE LOW BACK PAIN WITHOUT SCIATICA: Primary | ICD-10-CM

## 2018-06-14 PROCEDURE — 99999 PR PBB SHADOW E&M-EST. PATIENT-LVL III: CPT | Mod: PBBFAC,,, | Performed by: PHYSICIAN ASSISTANT

## 2018-06-14 PROCEDURE — 99243 OFF/OP CNSLTJ NEW/EST LOW 30: CPT | Mod: S$GLB,,, | Performed by: PHYSICIAN ASSISTANT

## 2018-06-14 NOTE — LETTER
Elise 15, 2018      Deborah Gillis, APRN  [address] [facility] [address] [place] [address]           [facility]  [address] [facility] [address]  [facility] [address]  Phone: [phone]  Fax: [phone]          Patient: Lavern Hodges   MR Number: [id number]   YOB: 1965   Date of Visit: 6/14/2018       Dear Deborah Gillis:    Thank you for referring Lavern Hodges to me for evaluation. Attached you will find relevant portions of my assessment and plan of care.    If you have questions, please do not hesitate to call me. I look forward to following Lavern Hodges along with you.    Sincerely,    Leigh Kaye PA-C    Enclosure  CC:  No Recipients    If you would like to receive this communication electronically, please contact [email or web address] or [phone] to request more information on [facility] Link access.    For providers and/or their staff who would like to refer a patient to [facility], please contact us through our one-stop-shop provider referral line, [facility], at [phone].    If you feel you have received this communication in error or would no longer like to receive these types of communications, please e-mail [email or web address]
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
## 2018-07-03 ENCOUNTER — CLINICAL SUPPORT (OUTPATIENT)
Dept: REHABILITATION | Facility: HOSPITAL | Age: 53
End: 2018-07-03
Payer: COMMERCIAL

## 2018-07-03 DIAGNOSIS — G89.29 CHRONIC MIDLINE LOW BACK PAIN WITHOUT SCIATICA: ICD-10-CM

## 2018-07-03 DIAGNOSIS — M54.50 CHRONIC MIDLINE LOW BACK PAIN WITHOUT SCIATICA: ICD-10-CM

## 2018-07-03 DIAGNOSIS — M54.2 CERVICAL PAIN: ICD-10-CM

## 2018-07-03 PROCEDURE — 97110 THERAPEUTIC EXERCISES: CPT | Mod: PN

## 2018-07-03 PROCEDURE — 97140 MANUAL THERAPY 1/> REGIONS: CPT | Mod: PN

## 2018-07-03 PROCEDURE — 97161 PT EVAL LOW COMPLEX 20 MIN: CPT | Mod: PN

## 2018-07-03 NOTE — PATIENT INSTRUCTIONS
Head nod        Start with head facing forward. Slowly bring  the chin towards your chest only slightly  (about 20 degrees). Bring the head back into  starting position    20 times, hold 2 seconds, 2 times a day, 3 times a day          UPPER TRAP STRETCH - HAND ON HEAD  Begin by retracting your head back into a  chin tuck position. Next, move your head  towards one side with the help of your hand  for light over pressure.    Hold 30 seconds repeat 3 times on each side. Perform 3 times per day.         LEVATOR SCAPULAE STRETCH - HAND  BEHIND BACK AND TOP OF HEAD  Place your arm on the affected side behind  your back and use your other hand to pull  your head downward and towards the  opposite side.  You should be looking towards your opposite  pocket of the target side.    Hold 30 seconds repeat 3 times on each side. Perform 3 times per day.          SEATED HAMSTRING STRETCH  While seated, rest your heel on the floor with  your knee straight and gently lean forward  until a stretch is felt behind your knee/thigh.    Hold 30 seconds repeat 3 times on each side. Perform 3 times per day.

## 2018-07-06 PROBLEM — M54.50 CHRONIC MIDLINE LOW BACK PAIN WITHOUT SCIATICA: Status: ACTIVE | Noted: 2018-07-06

## 2018-07-06 PROBLEM — M54.2 CERVICAL PAIN: Status: ACTIVE | Noted: 2018-07-06

## 2018-07-06 PROBLEM — G89.29 CHRONIC MIDLINE LOW BACK PAIN WITHOUT SCIATICA: Status: ACTIVE | Noted: 2018-07-06
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
## 2018-07-11 ENCOUNTER — TELEPHONE (OUTPATIENT)
Dept: SPINE | Facility: CLINIC | Age: 53
End: 2018-07-11

## 2018-07-11 NOTE — TELEPHONE ENCOUNTER
LM for the pt letting her know Ms. Kaye and her staff are out this week, but will return on Monday. I let her know she can call back if she feels I may be able to answer her questions.

## 2018-07-11 NOTE — TELEPHONE ENCOUNTER
----- Message from Peter Barkley sent at 7/11/2018  1:07 PM CDT -----  Type: Needs Medical Advice    Who Called: self   Symptoms (please be specific):  NA   How long has patient had these symptoms:  TYSHAWN   Pharmacy name and phone #:  [facility]   Best Call Back Number: [phone]  Additional Information: Patient called asking to speak with the nurse regarding physical therapy.

## 2018-07-12 ENCOUNTER — TELEPHONE (OUTPATIENT)
Dept: SPINE | Facility: CLINIC | Age: 53
End: 2018-07-12

## 2018-07-12 NOTE — TELEPHONE ENCOUNTER
----- Message from Marilee Patterson sent at 7/12/2018 10:30 AM CDT -----  Contact: Patient  Type:  Patient Returning Call    Who Called:  Lavern  Who Left Message for Patient:  Clarice  Does the patient know what this is regarding?:  Unsure  Best Call Back Number:  [phone]  Additional Information:
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
## 2018-07-16 NOTE — TELEPHONE ENCOUNTER
Lavern said that PT caused severe pain in her neck to the point that she could hardly move it and it caused headaches. She does not want to go through that again and is asking if there is some water therapy or massage therapy that she could do instead of the PT?

## 2018-07-16 NOTE — TELEPHONE ENCOUNTER
"[facility] in [place] was a pool for aquatherapy that she can try.  We can get the referral/ order changed to [place].    Physical therapy can do "manual therapy" which is a type of massage in conjunction with the rest of therapy, but will not solely do massage as the form of treatment.    She can see a massage therapist on her own, but I cannot write an order for it.  "

## 2018-07-16 NOTE — TELEPHONE ENCOUNTER
Sometimes PT can initially cause increased pain because you are moving and stretching muscles and it can cause some soreness.      I would continue with PT for a few more visits to see if it helps.      If it does not or if she would like at this time, I can order an MRI.    To help with pain, I agree with the advice given by Clarice.

## 2018-07-17 ENCOUNTER — TELEPHONE (OUTPATIENT)
Dept: SPINE | Facility: CLINIC | Age: 53
End: 2018-07-17

## 2018-07-17 ENCOUNTER — DOCUMENTATION ONLY (OUTPATIENT)
Dept: REHABILITATION | Facility: HOSPITAL | Age: 53
End: 2018-07-17

## 2018-07-17 NOTE — TELEPHONE ENCOUNTER
----- Message from Tarik Damon sent at 7/17/2018  1:49 PM CDT -----  Type:  Patient Returning Call    Who Called:  Patient  Who Left Message for Patient:  Terence  Does the patient know what this is regarding?:  Physical therapy  Best Call Back Number:  [phone]

## 2018-07-17 NOTE — TELEPHONE ENCOUNTER
Spoke with patient and let her know what Leigh recommended and she said she will look into a place closer to her and will call back to let us know where she will be going.

## 2018-07-17 NOTE — PROGRESS NOTES
Left voicemail for patient regarding missed three appt and if she is still in need of our services. She also has appts scheduled through August 2018 and making sure she still needs these appts or if she does not need to return to therapy we will DC her.

## 2018-07-23 ENCOUNTER — TELEPHONE (OUTPATIENT)
Dept: REHABILITATION | Facility: HOSPITAL | Age: 53
End: 2018-07-23

## 2018-08-22 RX ORDER — DICYCLOMINE HYDROCHLORIDE 20 MG/1
TABLET ORAL
Qty: 120 TABLET | Refills: 0 | Status: SHIPPED | OUTPATIENT
Start: 2018-08-22 | End: 2018-12-26 | Stop reason: SDUPTHER

## 2018-10-15 ENCOUNTER — TELEPHONE (OUTPATIENT)
Dept: FAMILY MEDICINE | Facility: CLINIC | Age: 53
End: 2018-10-15

## 2018-10-15 ENCOUNTER — OFFICE VISIT (OUTPATIENT)
Dept: FAMILY MEDICINE | Facility: CLINIC | Age: 53
End: 2018-10-15
Payer: COMMERCIAL

## 2018-10-15 VITALS
RESPIRATION RATE: 18 BRPM | HEIGHT: 64 IN | TEMPERATURE: 98 F | DIASTOLIC BLOOD PRESSURE: 60 MMHG | SYSTOLIC BLOOD PRESSURE: 100 MMHG | WEIGHT: 169.06 LBS | BODY MASS INDEX: 28.86 KG/M2 | HEART RATE: 80 BPM

## 2018-10-15 DIAGNOSIS — J32.9 SINUSITIS, UNSPECIFIED CHRONICITY, UNSPECIFIED LOCATION: Primary | ICD-10-CM

## 2018-10-15 DIAGNOSIS — H10.9 CONJUNCTIVITIS, UNSPECIFIED CONJUNCTIVITIS TYPE, UNSPECIFIED LATERALITY: ICD-10-CM

## 2018-10-15 PROCEDURE — 3008F BODY MASS INDEX DOCD: CPT | Mod: CPTII,S$GLB,, | Performed by: NURSE PRACTITIONER

## 2018-10-15 PROCEDURE — 99214 OFFICE O/P EST MOD 30 MIN: CPT | Mod: S$GLB,,, | Performed by: NURSE PRACTITIONER

## 2018-10-15 RX ORDER — DOXYCYCLINE 100 MG/1
100 CAPSULE ORAL 2 TIMES DAILY
Qty: 20 CAPSULE | Refills: 0 | Status: SHIPPED | OUTPATIENT
Start: 2018-10-15 | End: 2018-10-25

## 2018-10-15 RX ORDER — NEOMYCIN/POLYMYXIN B/HYDROCORT 3.5-10K-1
1 SUSPENSION, DROPS(FINAL DOSAGE FORM)(ML) OPHTHALMIC (EYE) 4 TIMES DAILY
Qty: 7.5 ML | Refills: 0 | Status: SHIPPED | OUTPATIENT
Start: 2018-10-15 | End: 2018-12-24

## 2018-10-15 NOTE — PROGRESS NOTES
"Subjective:       Patient ID: Lavern Hodges is a 53 y.o. female.    Chief Complaint: Sinus Congestion (Symptoms about 5 days: Declined flu shot for now); Itchy eyes; and Cough    Sinusitis   This is a new problem. Episode onset: one week. The problem has been gradually worsening since onset. Associated symptoms include congestion, coughing, ear pain, headaches, sinus pressure, sneezing, a sore throat and swollen glands. Pertinent negatives include no chills, diaphoresis, hoarse voice, neck pain or shortness of breath. Past treatments include nothing (afrin, nasal steroids). The treatment provided no relief.     Review of Systems   Constitutional: Positive for appetite change and fatigue. Negative for chills and diaphoresis.   HENT: Positive for congestion, ear pain, postnasal drip, rhinorrhea, sinus pressure, sneezing and sore throat. Negative for hoarse voice.    Eyes: Negative for pain and redness.   Respiratory: Positive for cough. Negative for choking, chest tightness and shortness of breath.    Cardiovascular: Negative for chest pain and palpitations.   Gastrointestinal: Negative for abdominal distention, abdominal pain, constipation, diarrhea, nausea and vomiting.   Endocrine: Negative for polydipsia and polyphagia.   Genitourinary: Negative for dysuria and hematuria.   Musculoskeletal: Negative for arthralgias, joint swelling, myalgias and neck pain.   Skin: Negative for color change and pallor.   Neurological: Positive for headaches. Negative for dizziness and light-headedness.       Past medical, surgical, family and social history reviewed.  Objective:     Vitals:    10/15/18 1208   BP: 100/60   Pulse: 80   Resp: 18   Temp: 98 °F (36.7 °C)   TempSrc: Oral   Weight: 76.7 kg (169 lb 1.5 oz)   Height: 5' 4" (1.626 m)   PainSc:   5   PainLoc: Neck     Body mass index is 29.02 kg/m².     Physical Exam   Constitutional: She is oriented to person, place, and time. She appears well-developed and well-nourished. "   HENT:   Head: Normocephalic and atraumatic.   Right Ear: Hearing, tympanic membrane, external ear and ear canal normal.   Left Ear: Hearing, tympanic membrane, external ear and ear canal normal.   Nose: Mucosal edema and rhinorrhea present. No nose lacerations or sinus tenderness. Right sinus exhibits maxillary sinus tenderness and frontal sinus tenderness. Left sinus exhibits maxillary sinus tenderness and frontal sinus tenderness.   Mouth/Throat: Uvula is midline and mucous membranes are normal. Posterior oropharyngeal edema and posterior oropharyngeal erythema present.   Eyes: Right eye exhibits no discharge. Left eye exhibits no discharge. Left conjunctiva is injected. No scleral icterus.   Neck: Normal range of motion. Neck supple. No tracheal deviation present.   Cardiovascular: Normal rate and regular rhythm.   No murmur heard.  Pulmonary/Chest: Effort normal and breath sounds normal. No stridor. No respiratory distress. She has no wheezes. She has no rales. She exhibits no tenderness.   Musculoskeletal: Normal range of motion.   Lymphadenopathy:     She has cervical adenopathy.   Neurological: She is alert and oriented to person, place, and time.   Skin: Skin is warm and dry.   Psychiatric: She has a normal mood and affect. Her behavior is normal. Judgment and thought content normal.       Assessment:       1. Sinusitis, unspecified chronicity, unspecified location        Plan:       Lavern was seen today for sinus congestion, itchy eyes and cough.    Diagnoses and all orders for this visit:    Sinusitis, unspecified chronicity, unspecified location  -     doxycycline (VIBRAMYCIN) 100 MG Cap; Take 1 capsule (100 mg total) by mouth 2 (two) times daily. for 10 days    Conjunctivitis, unspecified conjunctivitis type, unspecified laterality      -     neomycin-polymyxin-hydrocortisone (CORTISPORIN) 3.5-10,000-10 mg-unit-mg/mL ophthalmic suspension; Place 1 drop into both eyes 4 (four) times daily.

## 2018-10-19 ENCOUNTER — TELEPHONE (OUTPATIENT)
Dept: FAMILY MEDICINE | Facility: CLINIC | Age: 53
End: 2018-10-19

## 2018-10-19 NOTE — TELEPHONE ENCOUNTER
She was just started on antibiotics Monday by kareem.  Most likely viral if not getting better.  She needs to finish the antibiotics. Also take daily allergy antihistamine.

## 2018-10-19 NOTE — TELEPHONE ENCOUNTER
----- Message from Lisha Stanton sent at 10/19/2018  8:11 AM CDT -----  Type: Needs Medical Advice    Who Called:  Patient   Symptoms (please be specific):  Upper respiratory infection not clearing up with antibiotics, still very congested  How long has patient had these symptoms:  Since last visit  Pharmacy name and phone #:    [facility] [address] [place] [address] AT [facility] [place] [place] OF [facility] [address]  [address] [place] [id number]  Phone: [phone] Fax: [phone]  Best Call Back Number: [phone]  Additional Information:

## 2018-10-25 ENCOUNTER — TELEPHONE (OUTPATIENT)
Dept: FAMILY MEDICINE | Facility: CLINIC | Age: 53
End: 2018-10-25

## 2018-10-25 NOTE — TELEPHONE ENCOUNTER
See 10/19 encounter, patient notified of provider response. Verbalized understanding, states she is feeling better.

## 2018-10-25 NOTE — TELEPHONE ENCOUNTER
----- Message from RT Sneha sent at 10/25/2018  1:45 PM CDT -----  Contact: pt    Called pod, pt , returned Nurse Catarina's missed call, thanks.

## 2018-11-02 ENCOUNTER — TELEPHONE (OUTPATIENT)
Dept: SPINE | Facility: CLINIC | Age: 53
End: 2018-11-02

## 2018-11-02 ENCOUNTER — IMMUNIZATION (OUTPATIENT)
Dept: FAMILY MEDICINE | Facility: CLINIC | Age: 53
End: 2018-11-02
Payer: COMMERCIAL

## 2018-11-02 PROCEDURE — 90686 IIV4 VACC NO PRSV 0.5 ML IM: CPT | Mod: S$GLB,,, | Performed by: INTERNAL MEDICINE

## 2018-11-02 PROCEDURE — 90471 IMMUNIZATION ADMIN: CPT | Mod: S$GLB,,, | Performed by: INTERNAL MEDICINE

## 2018-11-02 NOTE — TELEPHONE ENCOUNTER
Spoke with patient and let her know Leigh does not have any appointments available today and she will call back at a later time to schedule an appointment after she looks at her work schedule.

## 2018-11-02 NOTE — TELEPHONE ENCOUNTER
----- Message from Mi Grewal sent at 11/2/2018  9:00 AM CDT -----  Contact: self  Patient [phone] is asking if she could be seen this morning in [place] by Leigh Kaye for neck pain/she is a patient of Ms Kaye/please advise

## 2018-12-18 RX ORDER — ESOMEPRAZOLE MAGNESIUM 40 MG/1
CAPSULE, DELAYED RELEASE ORAL
Qty: 30 CAPSULE | Refills: 6 | Status: SHIPPED | OUTPATIENT
Start: 2018-12-18 | End: 2019-06-28 | Stop reason: SDUPTHER

## 2018-12-24 ENCOUNTER — OFFICE VISIT (OUTPATIENT)
Dept: URGENT CARE | Facility: CLINIC | Age: 53
End: 2018-12-24

## 2018-12-24 ENCOUNTER — OFFICE VISIT (OUTPATIENT)
Dept: FAMILY MEDICINE | Facility: CLINIC | Age: 53
End: 2018-12-24
Payer: COMMERCIAL

## 2018-12-24 VITALS
DIASTOLIC BLOOD PRESSURE: 76 MMHG | HEIGHT: 64 IN | SYSTOLIC BLOOD PRESSURE: 102 MMHG | BODY MASS INDEX: 28.71 KG/M2 | OXYGEN SATURATION: 98 % | RESPIRATION RATE: 16 BRPM | WEIGHT: 168.19 LBS | HEART RATE: 89 BPM | TEMPERATURE: 99 F

## 2018-12-24 VITALS
HEIGHT: 64 IN | RESPIRATION RATE: 18 BRPM | DIASTOLIC BLOOD PRESSURE: 73 MMHG | OXYGEN SATURATION: 98 % | BODY MASS INDEX: 28.68 KG/M2 | SYSTOLIC BLOOD PRESSURE: 130 MMHG | HEART RATE: 88 BPM | WEIGHT: 168 LBS | TEMPERATURE: 98 F

## 2018-12-24 DIAGNOSIS — J32.9 BACTERIAL SINUSITIS: Primary | ICD-10-CM

## 2018-12-24 DIAGNOSIS — B37.9 ANTIBIOTIC-INDUCED YEAST INFECTION: ICD-10-CM

## 2018-12-24 DIAGNOSIS — J32.9 RHINOSINUSITIS: Primary | ICD-10-CM

## 2018-12-24 DIAGNOSIS — T36.95XA ANTIBIOTIC-INDUCED YEAST INFECTION: ICD-10-CM

## 2018-12-24 DIAGNOSIS — B96.89 BACTERIAL SINUSITIS: Primary | ICD-10-CM

## 2018-12-24 DIAGNOSIS — R45.89 ANXIETY ABOUT HEALTH: ICD-10-CM

## 2018-12-24 DIAGNOSIS — R30.0 DYSURIA: ICD-10-CM

## 2018-12-24 LAB
BILIRUB SERPL-MCNC: NEGATIVE MG/DL
BLOOD URINE, POC: NEGATIVE
COLOR, POC UA: NORMAL
GLUCOSE UR QL STRIP: NORMAL
KETONES UR QL STRIP: NEGATIVE
LEUKOCYTE ESTERASE URINE, POC: NEGATIVE
NITRITE, POC UA: NEGATIVE
PH, POC UA: 7
PROTEIN, POC: NEGATIVE
SPECIFIC GRAVITY, POC UA: 1.01
UROBILINOGEN, POC UA: NORMAL

## 2018-12-24 PROCEDURE — 99999 PR PBB SHADOW E&M-EST. PATIENT-LVL III: CPT | Mod: PBBFAC,,, | Performed by: PHYSICIAN ASSISTANT

## 2018-12-24 PROCEDURE — 99214 OFFICE O/P EST MOD 30 MIN: CPT | Mod: 25,S$GLB,, | Performed by: PHYSICIAN ASSISTANT

## 2018-12-24 PROCEDURE — 87086 URINE CULTURE/COLONY COUNT: CPT

## 2018-12-24 PROCEDURE — 81002 URINALYSIS NONAUTO W/O SCOPE: CPT | Mod: S$GLB,,, | Performed by: PHYSICIAN ASSISTANT

## 2018-12-24 PROCEDURE — 3008F BODY MASS INDEX DOCD: CPT | Mod: CPTII,S$GLB,, | Performed by: PHYSICIAN ASSISTANT

## 2018-12-24 RX ORDER — METHYLPREDNISOLONE 4 MG/1
TABLET ORAL
Qty: 1 PACKAGE | Refills: 0 | Status: SHIPPED | OUTPATIENT
Start: 2018-12-24 | End: 2019-06-28 | Stop reason: ALTCHOICE

## 2018-12-24 RX ORDER — FLUCONAZOLE 150 MG/1
150 TABLET ORAL ONCE
Qty: 2 TABLET | Refills: 0 | Status: SHIPPED | OUTPATIENT
Start: 2018-12-24 | End: 2018-12-24

## 2018-12-24 RX ORDER — DOXYCYCLINE HYCLATE 100 MG
100 TABLET ORAL EVERY 12 HOURS
Qty: 14 TABLET | Refills: 0 | Status: SHIPPED | OUTPATIENT
Start: 2018-12-24 | End: 2018-12-31

## 2018-12-24 NOTE — PATIENT INSTRUCTIONS
Sinusitis (Antibiotic Treatment)    The sinuses are air-filled spaces within the bones of the face. They connect to the inside of the nose. Sinusitis is an inflammation of the tissue lining the sinus cavity. Sinus inflammation can occur during a cold. It can also be due to allergies to pollens and other particles in the air. Sinusitis can cause symptoms of sinus congestion and fullness. A sinus infection causes fever, headache and facial pain. There is often green or yellow drainage from the nose or into the back of the throat (post-nasal drip). You have been given antibiotics to treat this condition.  Home care:  · Take the full course of antibiotics as instructed. Do not stop taking them, even if you feel better.  · Drink plenty of water, hot tea, and other liquids. This may help thin mucus. It also may promote sinus drainage.  · Heat may help soothe painful areas of the face. Use a towel soaked in hot water. Or,  the shower and direct the hot spray onto your face. Using a vaporizer along with a menthol rub at night may also help.   · An expectorant containing guaifenesin may help thin the mucus and promote drainage from the sinuses.  · Over-the-counter decongestants may be used unless a similar medicine was prescribed. Nasal sprays work the fastest. Use one that contains phenylephrine or oxymetazoline. First blow the nose gently. Then use the spray. Do not use these medicines more often than directed on the label or symptoms may get worse. You may also use tablets containing pseudoephedrine. Avoid products that combine ingredients, because side effects may be increased. Read labels. You can also ask the pharmacist for help. (NOTE: Persons with high blood pressure should not use decongestants. They can raise blood pressure.)  · Over-the-counter antihistamines may help if allergies contributed to your sinusitis.    · Do not use nasal rinses or irrigation during an acute sinus infection, unless told to by  your health care provider. Rinsing may spread the infection to other sinuses.  · Use acetaminophen or ibuprofen to control pain, unless another pain medicine was prescribed. (If you have chronic liver or kidney disease or ever had a stomach ulcer, talk with your doctor before using these medicines. Aspirin should never be used in anyone under 18 years of age who is ill with a fever. It may cause severe liver damage.)  · Don't smoke. This can worsen symptoms.  Follow-up care  Follow up with your healthcare provider or our staff if you are not improving within the next week.  When to seek medical advice  Call your healthcare provider if any of these occur:  · Facial pain or headache becoming more severe  · Stiff neck  · Unusual drowsiness or confusion  · Swelling of the forehead or eyelids  · Vision problems, including blurred or double vision  · Fever of 100.4ºF (38ºC) or higher, or as directed by your healthcare provider  · Seizure  · Breathing problems  · Symptoms not resolving within 10 days  Date Last Reviewed: 4/13/2015  © 8785-1953 [employer]. [address] [place] [address], [place] [address]. All rights reserved. This information is not intended as a substitute for professional medical care. Always follow your healthcare professional's instructions.

## 2018-12-24 NOTE — PROGRESS NOTES
Subjective:      Patient ID: Lavern Hodges is a 53 y.o. female.    Chief Complaint: Nasal Congestion (x 1wk ); Headache; Vaginal Itching (monostat ); and Dysuria    Patient is new to me, here today for congestion      Vaginal Itching   The patient's primary symptoms include pelvic pain (pressure). The patient's pertinent negatives include no vaginal discharge. The current episode started in the past 7 days. Associated symptoms include dysuria (vaginal burning), frequency, headaches and a sore throat (initially, resolved). Pertinent negatives include no abdominal pain, chills, constipation, diarrhea, fever, hematuria, nausea, rash or vomiting. Treatments tried: Monistat 3days (x2) The treatment provided significant relief.   URI    This is a new problem. The current episode started in the past 7 days (4-5days). Associated symptoms include congestion, dysuria (vaginal burning), headaches, rhinorrhea, sinus pain and a sore throat (initially, resolved). Pertinent negatives include no abdominal pain, coughing, diarrhea, ear pain (yesterday, improved), nausea, rash, vomiting or wheezing. Treatments tried: nasal spray, motrin.     Review of Systems   Constitutional: Negative for chills, diaphoresis and fever.   HENT: Positive for congestion, postnasal drip, rhinorrhea, sinus pressure, sinus pain and sore throat (initially, resolved). Negative for ear pain (yesterday, improved).    Respiratory: Negative for cough, shortness of breath and wheezing.    Gastrointestinal: Negative for abdominal pain, constipation, diarrhea, nausea and vomiting.   Genitourinary: Positive for dysuria (vaginal burning), frequency, pelvic pain (pressure) and vaginal pain (burning). Negative for hematuria and vaginal discharge.        Spotting x1wk ago  Patient does have cycles, but typically are irregular, however, this is longer than is normal for her   Skin: Negative for rash.   Neurological: Positive for headaches. Negative for dizziness and  "light-headedness.       Objective:   /76   Pulse 89   Temp 98.7 °F (37.1 °C) (Oral)   Resp 16   Ht 5' 4" (1.626 m)   Wt 76.3 kg (168 lb 3.4 oz)   LMP 12/15/2018   SpO2 98%   BMI 28.87 kg/m²   Physical Exam   Constitutional: Vital signs are normal. She appears well-developed and well-nourished. She does not appear ill. No distress.   HENT:   Head: Normocephalic and atraumatic.   Right Ear: Tympanic membrane and ear canal normal. Tympanic membrane is not erythematous. No middle ear effusion.   Left Ear: Tympanic membrane and ear canal normal. Tympanic membrane is not erythematous.  No middle ear effusion.   Nose: Mucosal edema present. Right sinus exhibits no maxillary sinus tenderness and no frontal sinus tenderness. Left sinus exhibits no maxillary sinus tenderness and no frontal sinus tenderness.   Mouth/Throat: Uvula is midline and oropharynx is clear and moist. No posterior oropharyngeal erythema.   Signs of PND   Cardiovascular: Normal rate, regular rhythm and normal heart sounds.   No murmur heard.  Pulmonary/Chest: Effort normal and breath sounds normal. No respiratory distress. She has no decreased breath sounds. She has no wheezes. She has no rhonchi. She has no rales.   Abdominal: Soft. There is tenderness (lower, mild). There is no CVA tenderness.   Genitourinary:   Genitourinary Comments: Deferred (patient spotting)   Lymphadenopathy:     She has no cervical adenopathy.   Skin: Skin is warm, dry and intact. No rash noted. She is not diaphoretic.   Psychiatric: She has a normal mood and affect. Her speech is normal and behavior is normal. Thought content normal. Cognition and memory are normal.     Assessment:      1. Bacterial sinusitis    2. Dysuria    3. Antibiotic-induced yeast infection       Plan:   Bacterial sinusitis  -     doxycycline (VIBRA-TABS) 100 MG tablet; Take 1 tablet (100 mg total) by mouth every 12 (twelve) hours. for 7 days  Dispense: 14 tablet; Refill: 0  -     " methylPREDNISolone (MEDROL DOSEPACK) 4 mg tablet; Use as directed  Dispense: 1 Package; Refill: 0    Dysuria  -     Cancel: Urinalysis  -     Urine culture  -     POCT urine dipstick without microscope    Antibiotic-induced yeast infection  -     fluconazole (DIFLUCAN) 150 MG Tab; Take 1 tablet (150 mg total) by mouth once. Repeat in 3 days if symptoms still persist. for 1 dose  Dispense: 2 tablet; Refill: 0    Schedule appt with GYN, unable to swab today d/t spotting, if spotting stops, recommend return to clinic for vaginosis screen if unable to see GYN soon    Patient requesting steroid and antibiotic. Explained that URIs are viral and that an antibiotic is not effective for viruses. Also discussed antibiotic resistance with misuse and overuse of antibiotics. Also reviewed risks of steroid injections. Risk and benefits of steroid therapy were reviewed in detail and include, but not limited to, elevated blood sugars, joint avascular necrosis, necrosis of tissue or infection of the injection site, hallucinations, insomnia, sweating, hyperactivity, tremors, suppression of one's own cortisone production, delayed wound healing and GI bleeding. The patient would like to proceed with steroid treatment (injection/medication) knowing and verbally accepting the risks.    Gave handout on sinusitis.  Printed AVS and reviewed treatment plan in detail.    Discussed worsening signs/symptoms and when to return to clinic or go to ED.   Patient expresses understanding and agrees with treatment plan.

## 2018-12-24 NOTE — PROGRESS NOTES
"Subjective:       Patient ID: Lavern Hodges is a 53 y.o. female.    Vitals:  height is 5' 4" (1.626 m) and weight is 76.2 kg (168 lb). Her oral temperature is 97.7 °F (36.5 °C). Her blood pressure is 130/73 and her pulse is 88. Her respiration is 18 and oxygen saturation is 98%.     Chief Complaint: URI    Pt seen at [facility] today and has prescribed meds for URI but dose of Medrol is 6 days and PA that she saw told her to do 3 days but package states 6 days  Wants clarification of dosage and rechecked due to question of visit that was done earlier      URI    This is a recurrent problem. Associated symptoms include congestion. Pertinent negatives include no coughing, ear pain, nausea, rash, sinus pain, sore throat, vomiting or wheezing. The treatment provided no relief.       Constitution: Negative for chills, sweating, fatigue and fever.   HENT: Positive for congestion. Negative for ear pain, sinus pain, sinus pressure, sore throat and voice change.    Neck: Negative for painful lymph nodes.   Eyes: Negative for eye redness.   Respiratory: Negative for chest tightness, cough, sputum production, bloody sputum, COPD, shortness of breath, stridor, wheezing and asthma.    Gastrointestinal: Negative for nausea and vomiting.   Musculoskeletal: Negative for muscle ache.   Skin: Negative for rash.   Allergic/Immunologic: Negative for seasonal allergies and asthma.   Hematologic/Lymphatic: Negative for swollen lymph nodes.       Objective:      Physical Exam   Constitutional: She is oriented to person, place, and time. She appears well-developed and well-nourished. She is cooperative.  Non-toxic appearance. She does not appear ill. She appears distressed.   HENT:   Head: Normocephalic and atraumatic.   Right Ear: Hearing, tympanic membrane, external ear and ear canal normal.   Left Ear: Hearing, tympanic membrane, external ear and ear canal normal.   Nose: Nose normal. No mucosal edema, rhinorrhea or nasal " deformity. No epistaxis. Right sinus exhibits no maxillary sinus tenderness and no frontal sinus tenderness. Left sinus exhibits no maxillary sinus tenderness and no frontal sinus tenderness.   Mouth/Throat: Uvula is midline, oropharynx is clear and moist and mucous membranes are normal. No trismus in the jaw. Normal dentition. No uvula swelling. No posterior oropharyngeal erythema.   Eyes: Conjunctivae and lids are normal. Right eye exhibits no discharge. Left eye exhibits no discharge. No scleral icterus.   Sclera clear bilat   Neck: Trachea normal, normal range of motion, full passive range of motion without pain and phonation normal. Neck supple.   Cardiovascular: Regular rhythm, normal heart sounds, intact distal pulses and normal pulses. Tachycardia present.   Pulmonary/Chest: Effort normal and breath sounds normal. No respiratory distress.   Abdominal: Soft. Normal appearance and bowel sounds are normal. She exhibits no distension, no pulsatile midline mass and no mass. There is no tenderness.   Musculoskeletal: Normal range of motion. She exhibits no edema or deformity.   Neurological: She is alert and oriented to person, place, and time. She exhibits normal muscle tone. Coordination normal.   Skin: Skin is warm, dry and intact. She is not diaphoretic. No pallor.   Psychiatric: She has a normal mood and affect. Her speech is normal and behavior is normal. Judgment and thought content normal. Cognition and memory are normal.   Nursing note and vitals reviewed.      Assessment:       1. Rhinosinusitis    2. Anxiety about health        Plan:         Rhinosinusitis    Anxiety about health     I had a lengthy discussion with the patient regarding her visit this morning, medications prescribed.  I insisted that I see no evidence of a 3 day steroid pack called in for the patient, but rather atypical 5 day Medrol Dosepak.  I reassured her that, if she were getting it from me, would prescribe that or a similar  steroid pack.  She is free to take steroid pack as directed by her previous provider.  Patient was anxious about chronic medical rib precautions and side effects.  We discussed the side effects of taking steroids, just as her previous provider mention verbally and documented in her note.  Ultimately, she decided to take the steroid pack as directed and will follow up here or with her primary care physician if her symptoms persist or worsen.    If you were prescribed a narcotic medication, do not drive or operate heavy equipment or machinery while taking these medications.  You must understand that you've received an Urgent Care treatment only and that you may be released before all your medical problems are known or treated. You, the patient, will arrange for follow up care as instructed.  Follow up with your PCP or specialty clinic as directed in the next 1-2 weeks if not improved or as needed.  You can call [phone] to schedule an appointment with the appropriate provider.  If your condition worsens we recommend that you receive another evaluation at the emergency room immediately or contact your primary medical clinics after hours call service to discuss your concerns.  Please return here or go to the Emergency Department for any concerns or worsening of condition.

## 2018-12-26 LAB — BACTERIA UR CULT: NORMAL

## 2018-12-26 RX ORDER — DICYCLOMINE HYDROCHLORIDE 20 MG/1
TABLET ORAL
Qty: 120 TABLET | Refills: 0 | Status: SHIPPED | OUTPATIENT
Start: 2018-12-26 | End: 2019-06-28 | Stop reason: SDUPTHER

## 2019-04-29 ENCOUNTER — TELEPHONE (OUTPATIENT)
Dept: FAMILY MEDICINE | Facility: CLINIC | Age: 54
End: 2019-04-29

## 2019-04-29 NOTE — TELEPHONE ENCOUNTER
Left message on recorder that Ms. Webb is out today and I rescheduled her 11:40 appointment to see Dr. Verma at 11:10 today. Please call if she can not make the appointment. .

## 2019-06-09 RX ORDER — DICYCLOMINE HYDROCHLORIDE 20 MG/1
TABLET ORAL
Qty: 120 TABLET | Refills: 0 | OUTPATIENT
Start: 2019-06-09

## 2019-06-10 NOTE — TELEPHONE ENCOUNTER
She has not been seen in over a year. She has to be seen yearly to keep giving her medications. Refill denied until visit done.

## 2019-06-24 ENCOUNTER — TELEPHONE (OUTPATIENT)
Dept: FAMILY MEDICINE | Facility: CLINIC | Age: 54
End: 2019-06-24

## 2019-06-24 NOTE — TELEPHONE ENCOUNTER
R/S pt from tomorrow to Friday, 6/28/19 due to provider being ill. Pt voiced understanding for appt. CLC

## 2019-06-28 ENCOUNTER — OFFICE VISIT (OUTPATIENT)
Dept: FAMILY MEDICINE | Facility: CLINIC | Age: 54
End: 2019-06-28
Payer: COMMERCIAL

## 2019-06-28 VITALS
BODY MASS INDEX: 29.02 KG/M2 | SYSTOLIC BLOOD PRESSURE: 104 MMHG | DIASTOLIC BLOOD PRESSURE: 60 MMHG | RESPIRATION RATE: 18 BRPM | HEIGHT: 64 IN | TEMPERATURE: 98 F | HEART RATE: 88 BPM | WEIGHT: 170 LBS

## 2019-06-28 DIAGNOSIS — R10.32 LLQ ABDOMINAL PAIN: ICD-10-CM

## 2019-06-28 DIAGNOSIS — K58.9 IRRITABLE BOWEL SYNDROME WITHOUT DIARRHEA: ICD-10-CM

## 2019-06-28 DIAGNOSIS — Z00.00 ANNUAL PHYSICAL EXAM: Primary | ICD-10-CM

## 2019-06-28 DIAGNOSIS — R10.11 RUQ ABDOMINAL PAIN: ICD-10-CM

## 2019-06-28 DIAGNOSIS — F41.9 ANXIETY: ICD-10-CM

## 2019-06-28 DIAGNOSIS — N20.0 KIDNEY STONE: ICD-10-CM

## 2019-06-28 DIAGNOSIS — K21.00 GASTROESOPHAGEAL REFLUX DISEASE WITH ESOPHAGITIS: ICD-10-CM

## 2019-06-28 DIAGNOSIS — R10.9 FLANK PAIN: ICD-10-CM

## 2019-06-28 DIAGNOSIS — R35.0 URINARY FREQUENCY: ICD-10-CM

## 2019-06-28 DIAGNOSIS — J30.1 ALLERGIC RHINITIS DUE TO POLLEN, UNSPECIFIED SEASONALITY: ICD-10-CM

## 2019-06-28 DIAGNOSIS — E55.9 VITAMIN D DEFICIENCY: ICD-10-CM

## 2019-06-28 PROCEDURE — 99396 PR PREVENTIVE VISIT,EST,40-64: ICD-10-PCS | Mod: S$GLB,,, | Performed by: NURSE PRACTITIONER

## 2019-06-28 PROCEDURE — 99396 PREV VISIT EST AGE 40-64: CPT | Mod: S$GLB,,, | Performed by: NURSE PRACTITIONER

## 2019-06-28 RX ORDER — DICYCLOMINE HYDROCHLORIDE 20 MG/1
TABLET ORAL
Qty: 120 TABLET | Refills: 2 | Status: SHIPPED | OUTPATIENT
Start: 2019-06-28 | End: 2020-11-19

## 2019-06-28 RX ORDER — ESOMEPRAZOLE MAGNESIUM 40 MG/1
CAPSULE, DELAYED RELEASE ORAL
Qty: 90 CAPSULE | Refills: 2 | Status: SHIPPED | OUTPATIENT
Start: 2019-06-28 | End: 2020-04-02

## 2019-06-28 NOTE — PROGRESS NOTES
Subjective:       Patient ID: Lavern Hodges is a 54 y.o. female.    Chief Complaint: Annual Exam (Physical with copy of labs with her)    HPI Here for annual exam. She recently had labs done by her gynecologist. She has been having some breakthrough menstrual bleeding that was heavy. She had biopsy done of the cervix which was fine. She was found to be low on Estrogen and progesterone. She did not want to take hormone therapy. She had repeat TSH with normal value at 1.84, she had FSH at 55.1, Estradiol at 13.3.  Vitamin D level a little low at 29. She is taking vitamin D supplement at this time.     States her IBS still has flare ups at times. She takes the bentyl at least once a day. Depends on what she eats and if having any crampy type symptoms. She has been having more RUQ discomfort after eating. Strong family history of gallstones. States she was told she has gallstones in the past but no blockage. She has some Left flank pain off and on. Has history of nephrolithiasis with lithotripsy, this was about 20 years ago. Had last renal stone evaluation in 2016, that showed bilateral small kidney stones. She thinks this is what her pain is from, concerned those have gotten worse. Has decreased urine output at times. States she feels she hydrates well. No urgency. Will check urine.    Having tension type headaches off and on. States her upper back and base of neck will feel tight and she will get a nagging type headache at the base of her head in the back. advil usually helps knock this out. Only happens about twice a month. Is under more stress with her son who is having some medical issues. She does not feel that she needs to see our headache clinic for this.     Health Maintenance:   Mammogram 5/1/19  Pap: 4/16/18  Lipid: 12/31/16: recommends every 5 years.  Colonoscopy: 11/18/16: negative with repeat in 10 years.    Review of Systems   Constitutional: Negative for appetite change, fatigue and fever.   HENT:  "Positive for postnasal drip, rhinorrhea and sneezing. Negative for congestion, ear pain, sinus pressure, sinus pain and sore throat.    Eyes: Negative for visual disturbance.   Respiratory: Negative for cough, chest tightness, shortness of breath and wheezing.    Cardiovascular: Negative for chest pain, palpitations and leg swelling.   Gastrointestinal: Positive for abdominal pain (cramping) and diarrhea (off and on). Negative for nausea and vomiting.   Genitourinary: Positive for decreased urine volume (off and on), flank pain (left side at times. not constant. comes and goes. achy to sharp in intensity) and frequency. Negative for urgency.   Musculoskeletal: Negative for arthralgias, back pain and gait problem.        Neck stiffness from tension.  Upper back, neck feels tight at times.    Skin: Negative for rash and wound.   Allergic/Immunologic: Positive for environmental allergies. Negative for food allergies.   Neurological: Positive for headaches (tension headache).   Hematological: Negative for adenopathy. Does not bruise/bleed easily.   Psychiatric/Behavioral: Negative for decreased concentration, dysphoric mood and sleep disturbance. The patient is nervous/anxious (increased stress).        Objective:     /60   Pulse 88   Temp 98.3 °F (36.8 °C) (Oral)   Resp 18   Ht 5' 4" (1.626 m)   Wt 77.1 kg (170 lb)   LMP 06/26/2019   BMI 29.18 kg/m²      Physical Exam   Constitutional: She is oriented to person, place, and time. She appears well-developed and well-nourished. No distress.   HENT:   Head: Normocephalic.   Right Ear: Tympanic membrane, external ear and ear canal normal.   Left Ear: Tympanic membrane, external ear and ear canal normal.   Nose: Rhinorrhea present. No mucosal edema.   Mouth/Throat: Uvula is midline, oropharynx is clear and moist and mucous membranes are normal.   Eyes: Pupils are equal, round, and reactive to light. Conjunctivae, EOM and lids are normal.   Neck: Trachea normal " and normal range of motion. Neck supple. No JVD present. Spinous process tenderness and muscular tenderness (bilateral trapezius spasms, tightness) present. No tracheal tenderness present. Carotid bruit is not present. No tracheal deviation, no edema and normal range of motion present. No thyromegaly present.   Cardiovascular: Normal rate, regular rhythm and normal heart sounds. Exam reveals no gallop.   No murmur heard.  Pulses:       Carotid pulses are 2+ on the right side, and 2+ on the left side.       Radial pulses are 2+ on the right side, and 2+ on the left side.   Pulmonary/Chest: Breath sounds normal. No stridor. No respiratory distress. She has no wheezes. She has no rhonchi. She has no rales.   Abdominal: Soft. Normal appearance and bowel sounds are normal. She exhibits no abdominal bruit and no mass. There is no hepatosplenomegaly. There is tenderness in the right upper quadrant, epigastric area and left lower quadrant. There is no rigidity, no rebound, no guarding, no tenderness at McBurney's point and negative Minor's sign.   Musculoskeletal: Normal range of motion.   Gait and coordination normal.  strong, equal. Normal strength to upper and lower extremities.    Lymphadenopathy:        Head (right side): No submandibular adenopathy present.        Head (left side): No submandibular adenopathy present.     She has no cervical adenopathy.   Neurological: She is alert and oriented to person, place, and time. She has normal strength. No sensory deficit. Coordination and gait normal.   Skin: Skin is warm and dry. Capillary refill takes less than 2 seconds. No lesion and no rash noted.   Psychiatric: She has a normal mood and affect. Her speech is normal and behavior is normal. Judgment and thought content normal. Cognition and memory are normal.       Assessment:       1. Annual physical exam    2. Anxiety    3. Gastroesophageal reflux disease with esophagitis    4. Urinary frequency    5. Flank pain     6. RUQ abdominal pain    7. LLQ abdominal pain    8. Irritable bowel syndrome without diarrhea    9. Kidney stone    10. Vitamin D deficiency    11. Allergic rhinitis due to pollen, unspecified seasonality        Plan:         Lavern was seen today for annual exam.    Diagnoses and all orders for this visit:    Annual physical exam    Anxiety: She does not feel that medication is needed for this. She feels she is tolerating this well enough at this time.    Gastroesophageal reflux disease with esophagitis: she is taking the nexium. Advised to try and wean this over to zantac.     Urinary frequency: No indication of infection.  Results for orders placed or performed in visit on 12/24/18   Urine culture   Result Value Ref Range    Urine Culture, Routine No significant growth    POCT urine dipstick without microscope   Result Value Ref Range    Color, UA Light Yellow     Spec Grav UA 1.010     pH, UA 7     WBC, UA Negative     Nitrite, UA Negative     Protein Negative     Glucose, UA Normal     Ketones, UA Negative     Urobilinogen, UA Normal     Bilirubin Negative     Blood, UA Negative      -     POCT urine dipstick without microscope    Flank pain: will get CT to evaluate kidney stones.  -     CT Renal Stone Study ABD Pelvis WO; Future    RUQ abdominal pain: will check gallbladder for any inflammation.   -     US Abdomen Complete; Future    LLQ abdominal pain: check US.   -     US Abdomen Complete; Future    Irritable bowel syndrome without diarrhea: continue on bentyl PRN.     Kidney stone: Will check her CT scan. If symptoms worsen, if she is not able to pass urine then go to ER.    Vitamin D deficiency: continue with vitamin D replacement.     Allergic rhinitis due to pollen, unspecified seasonality: continue with flonase daily.     Other orders  -     dicyclomine (BENTYL) 20 mg tablet; TAKE 1 TABLET(20 MG) BY MOUTH FOUR TIMES DAILY prn  -     esomeprazole (NEXIUM) 40 MG capsule; TAKE 1 CAPSULE BY MOUTH ONCE  DAILY IN THE EVENING    Continue current medication  Take medications only as prescribed  Healthy diet, exercise  Adequate rest  Adequate hydration  Avoid allergens  Avoid excessive caffeine

## 2019-07-10 ENCOUNTER — PATIENT MESSAGE (OUTPATIENT)
Dept: FAMILY MEDICINE | Facility: CLINIC | Age: 54
End: 2019-07-10

## 2019-07-10 ENCOUNTER — TELEPHONE (OUTPATIENT)
Dept: FAMILY MEDICINE | Facility: CLINIC | Age: 54
End: 2019-07-10

## 2019-07-10 ENCOUNTER — HOSPITAL ENCOUNTER (OUTPATIENT)
Dept: RADIOLOGY | Facility: HOSPITAL | Age: 54
Discharge: HOME | End: 2019-07-10
Attending: NURSE PRACTITIONER
Payer: COMMERCIAL

## 2019-07-10 DIAGNOSIS — R10.11 RUQ ABDOMINAL PAIN: ICD-10-CM

## 2019-07-10 DIAGNOSIS — R10.32 LLQ ABDOMINAL PAIN: ICD-10-CM

## 2019-07-10 DIAGNOSIS — R93.5 ABNORMAL CT SCAN, PELVIS: Primary | ICD-10-CM

## 2019-07-10 DIAGNOSIS — R10.9 FLANK PAIN: ICD-10-CM

## 2019-07-10 PROCEDURE — 74176 CT RENAL STONE STUDY ABD PELVIS WO: ICD-10-PCS | Mod: 26,,, | Performed by: RADIOLOGY

## 2019-07-10 PROCEDURE — 74176 CT ABD & PELVIS W/O CONTRAST: CPT | Mod: TC,PO

## 2019-07-10 PROCEDURE — 74176 CT ABD & PELVIS W/O CONTRAST: CPT | Mod: 26,,, | Performed by: RADIOLOGY

## 2019-07-10 PROCEDURE — 76700 US EXAM ABDOM COMPLETE: CPT | Mod: TC,PO

## 2019-07-10 PROCEDURE — 76700 US EXAM ABDOM COMPLETE: CPT | Mod: 26,,, | Performed by: RADIOLOGY

## 2019-07-10 PROCEDURE — 76700 US ABDOMEN COMPLETE: ICD-10-PCS | Mod: 26,,, | Performed by: RADIOLOGY

## 2019-07-10 RX ORDER — ESOMEPRAZOLE MAGNESIUM 40 MG/1
CAPSULE, DELAYED RELEASE ORAL
Qty: 30 CAPSULE | Refills: 6 | Status: SHIPPED | OUTPATIENT
Start: 2019-07-10 | End: 2019-10-18 | Stop reason: SDUPTHER

## 2019-07-10 RX ORDER — SERTRALINE HYDROCHLORIDE 25 MG/1
25 TABLET, FILM COATED ORAL DAILY
Qty: 30 TABLET | Refills: 2 | Status: SHIPPED | OUTPATIENT
Start: 2019-07-10 | End: 2019-10-18

## 2019-07-10 RX ORDER — VALACYCLOVIR HYDROCHLORIDE 500 MG/1
500 TABLET, FILM COATED ORAL 2 TIMES DAILY PRN
Qty: 30 TABLET | Refills: 4 | Status: SHIPPED | OUTPATIENT
Start: 2019-07-10 | End: 2020-10-10 | Stop reason: ALTCHOICE

## 2019-07-10 NOTE — TELEPHONE ENCOUNTER
"Spoke to patient regarding My Chart message, verbalized understanding of results. States she is scheduled with her GYN for a D&C on 7/17/19 and recently had a transvag US "about a week ago with Dr. Mensah" and would like records requested for provider to review prior to scheduling another US. Patient also states she is aware "from years ago" that she has a hernia and was never advised that it was an issue.     Will request records from Dr. Mensah' office during regular business hours.   "

## 2019-07-10 NOTE — TELEPHONE ENCOUNTER
See her patient portal message. She needs pelvic ultrasound done. If she has never had her diaphragmatic hernia evaluated then we need to refer her to surgery for evaluation.

## 2019-07-10 NOTE — TELEPHONE ENCOUNTER
Patient requesting a refill on prescription for fever blisters as well as an anxiety medication that patient states was discussed with provider at recent office visit. Per office note from 6/28, patient declined medication. She now feels as though she needs a prescription. Please advise.

## 2019-07-11 NOTE — TELEPHONE ENCOUNTER
That is fine. No need for repeat ultrasound and/or surgical consult. As long as someone is taking care of it.

## 2019-07-11 NOTE — TELEPHONE ENCOUNTER
Notified patient of provider message, verbalized understanding. Patient requested that we receive records of recent US from Dr. Mensah' office for PCP to review and determine if transvag US is still needed. Will request records from Dr. Mensah' office after their return to clinic.

## 2019-07-12 RX ORDER — CLONAZEPAM 0.5 MG/1
0.5 TABLET ORAL NIGHTLY PRN
Qty: 20 TABLET | Refills: 0 | Status: SHIPPED | OUTPATIENT
Start: 2019-07-12 | End: 2020-10-10

## 2019-07-12 NOTE — TELEPHONE ENCOUNTER
----- Message from RT Sneha sent at 7/12/2019  7:27 AM CDT -----  Contact: pt    pt , Rx refill: Clonazepam 0.5 mg, please, please call to confirm, thanks.  
Sent medication to pharmacy  
Constitutional: no fever, chills or generalized weakness  ENT: + throat irritation. no change in voice, no excessive drooling, no ear pain/discharge, no hearing changes.   Cardiovascular: no chest pain, no sob  Respiratory: + mild cough. no shortness of breath  Gastrointestinal: no nausea, vomiting or diarrhea. no abdominal pain.   Integumentary: no rash or skin changes. no edema  Neurological: + mild headache., no dizziness, no visual changes, no UE/LE weakness or paresthesias. no change in mental status. no neck pain or stiffness.

## 2019-09-16 ENCOUNTER — TELEPHONE (OUTPATIENT)
Dept: FAMILY MEDICINE | Facility: CLINIC | Age: 54
End: 2019-09-16

## 2019-09-16 DIAGNOSIS — K44.9 DIAPHRAGMATIC HERNIA WITHOUT OBSTRUCTION AND WITHOUT GANGRENE: Primary | ICD-10-CM

## 2019-09-16 NOTE — TELEPHONE ENCOUNTER
Notified patient of referral, verbalized understanding and opted not to schedule at this time but will call back to schedule at earliest convenience.

## 2019-09-16 NOTE — TELEPHONE ENCOUNTER
Returned patient's call, states she was advised during recent office visit a referral is needed for hernia. Patient opted to postpone at the time due to other issues but is requesting a referral to proceed with scheduling. Referral pended, please advise.

## 2019-09-16 NOTE — TELEPHONE ENCOUNTER
----- Message from Fran Guo sent at 9/16/2019  1:04 PM CDT -----  Contact: self   Placed call to pod, patient miss call from your office please call back at [phone] (bazh)

## 2019-09-16 NOTE — TELEPHONE ENCOUNTER
----- Message from Mikayla Win sent at 9/16/2019 10:58 AM CDT -----  Contact: Patient  Type: Needs Medical Advice    Who Called:  Patient  Best Call Back Number:   Additional Information: Calling to speak with the Nurse to find out if she needs a General Surgeon or a Gastroenterologist. Please advise

## 2019-10-17 NOTE — TELEPHONE ENCOUNTER
----- Message from Yanira Mc sent at 12/5/2017  7:32 AM CST -----  Contact: self  Would like a z-pac called in for sinus congestion, coughing, congestion is chest.  Taking Robatussin DM and Nasacort. Please call back at [phone] (home)     [facility] [address] [place] [address] AT [facility] [address]  [address] [place] [id number]  Phone: [phone] Fax: [phone]      
Eft message stating that rx sent to pharmacy.   
Sent medication to pharmacy  
Spoke to pt, congestion in chest and nasal, sinusitis, cough, and scratchy throat.   Been taking robitussin DM and nasal nadia. Said since Saturday, chest getting tight, no fever reported.   Pt requesting zpak, she said this has helped her in the past.    
To get better and follow your care plan as instructed.

## 2019-10-18 ENCOUNTER — OFFICE VISIT (OUTPATIENT)
Dept: FAMILY MEDICINE | Facility: CLINIC | Age: 54
End: 2019-10-18
Payer: COMMERCIAL

## 2019-10-18 VITALS
OXYGEN SATURATION: 96 % | WEIGHT: 168.19 LBS | RESPIRATION RATE: 16 BRPM | TEMPERATURE: 98 F | HEIGHT: 64 IN | DIASTOLIC BLOOD PRESSURE: 76 MMHG | BODY MASS INDEX: 28.71 KG/M2 | SYSTOLIC BLOOD PRESSURE: 116 MMHG | HEART RATE: 92 BPM

## 2019-10-18 DIAGNOSIS — Z00.00 LABORATORY EXAM ORDERED AS PART OF ROUTINE GENERAL MEDICAL EXAMINATION: ICD-10-CM

## 2019-10-18 DIAGNOSIS — J30.1 ALLERGIC RHINITIS DUE TO POLLEN, UNSPECIFIED SEASONALITY: Primary | ICD-10-CM

## 2019-10-18 DIAGNOSIS — R68.2 DRY MOUTH: ICD-10-CM

## 2019-10-18 PROCEDURE — 99214 PR OFFICE/OUTPT VISIT, EST, LEVL IV, 30-39 MIN: ICD-10-PCS | Mod: 25,S$GLB,, | Performed by: NURSE PRACTITIONER

## 2019-10-18 PROCEDURE — 3008F BODY MASS INDEX DOCD: CPT | Mod: CPTII,S$GLB,, | Performed by: NURSE PRACTITIONER

## 2019-10-18 PROCEDURE — 90471 FLU VACCINE (QUAD) GREATER THAN OR EQUAL TO 3YO PRESERVATIVE FREE IM: ICD-10-PCS | Mod: S$GLB,,, | Performed by: NURSE PRACTITIONER

## 2019-10-18 PROCEDURE — 90686 FLU VACCINE (QUAD) GREATER THAN OR EQUAL TO 3YO PRESERVATIVE FREE IM: ICD-10-PCS | Mod: S$GLB,,, | Performed by: NURSE PRACTITIONER

## 2019-10-18 PROCEDURE — 90686 IIV4 VACC NO PRSV 0.5 ML IM: CPT | Mod: S$GLB,,, | Performed by: NURSE PRACTITIONER

## 2019-10-18 PROCEDURE — 99214 OFFICE O/P EST MOD 30 MIN: CPT | Mod: 25,S$GLB,, | Performed by: NURSE PRACTITIONER

## 2019-10-18 PROCEDURE — 3008F PR BODY MASS INDEX (BMI) DOCUMENTED: ICD-10-PCS | Mod: CPTII,S$GLB,, | Performed by: NURSE PRACTITIONER

## 2019-10-18 PROCEDURE — 90471 IMMUNIZATION ADMIN: CPT | Mod: S$GLB,,, | Performed by: NURSE PRACTITIONER

## 2019-10-18 RX ORDER — PROGESTERONE 200 MG/1
CAPSULE ORAL
Refills: 4 | COMMUNITY
Start: 2019-09-01 | End: 2021-05-25

## 2019-10-18 NOTE — PROGRESS NOTES
Subjective:       Patient ID: Lavern Hodges is a 54 y.o. female.    Chief Complaint: dry throat (off and on  x 1 mth ) and dry mouth (off and on x 1 mth )    HPI here with symptoms of dry mouth and throat over the past month.  He has had a postnasal drip.  States symptoms are worse when she 1st gets up in the morning.  After she drinks something the discomfort improves.  She does not have the drive mouth throughout the day, just during the night and first thing in the morning.  She is not taking allergy sinus medication on a regular basis.  No other concerns.  See review of systems.    Health maintenance:  She is due for labs. flu vaccine and shingles vaccine    The following portion of the patients history was reviewed and updated as appropriate: allergies, current medications, past medical and surgical history. Past social history and problem list reviewed. Family PMH and Past social history reviewed. Tobacco, Illicit drug use reviewed.      Review of patient's allergies indicates:   Allergen Reactions    Cefaclor Hives    Codeine Hives         Current Outpatient Medications:     acetaminophen (TYLENOL) 325 MG tablet, Every 4-6 hours PRN, Disp: , Rfl:     clonazePAM (KLONOPIN) 0.5 MG tablet, Take 1 tablet (0.5 mg total) by mouth nightly as needed for Anxiety., Disp: 20 tablet, Rfl: 0    dicyclomine (BENTYL) 20 mg tablet, TAKE 1 TABLET(20 MG) BY MOUTH FOUR TIMES DAILY prn, Disp: 120 tablet, Rfl: 2    esomeprazole (NEXIUM) 40 MG capsule, TAKE 1 CAPSULE BY MOUTH ONCE DAILY IN THE EVENING, Disp: 90 capsule, Rfl: 2    ibuprofen (IBUPROFEN IB) 200 MG tablet, Take 100 mg by mouth as needed for Pain., Disp: , Rfl:     progesterone (PROMETRIUM) 200 MG capsule, TK 1 C PO QHS ON DAYS 1 THROUGH 12 OF THE MONTH ON CYCLE, Disp: , Rfl: 4    valACYclovir (VALTREX) 500 MG tablet, Take 1 tablet (500 mg total) by mouth 2 (two) times daily as needed., Disp: 30 tablet, Rfl: 4    Past Medical History:   Diagnosis Date     Allergy     Anxiety     Asthma     due to allergies     GERD (gastroesophageal reflux disease)     History of nephrolithiasis     Irritable bowel syndrome     Kidney stone     lithotripsy 20 years ago    Peptic ulcer disease     PONV (postoperative nausea and vomiting)     Colin syndrome     Vitamin D deficiency        Past Surgical History:   Procedure Laterality Date     SECTION      times 2    COLONOSCOPY  2016    repeat in 10 years. clear.  internal/external hemorrhoids only.    COLONOSCOPY N/A 2016    Procedure: COLONOSCOPY;  Surgeon: Sherman Arevalo MD;  Location: [facility];  Service: Endoscopy;  Laterality: N/A;    HYSTEROSCOPY      TUBAL LIGATION      UPPER GASTROINTESTINAL ENDOSCOPY  10/29/2010    Dr. Arevalo: michael hernandez ring- dilated, gastric polyp removed, otherwise normal findings. repeat PRN    WISDOM TOOTH EXTRACTION         Social History     Socioeconomic History    Marital status:      Spouse name: Not on file    Number of children: Not on file    Years of education: Not on file    Highest education level: Not on file   Occupational History    Not on file   Social Needs    Financial resource strain: Not on file    Food insecurity:     Worry: Not on file     Inability: Not on file    Transportation needs:     Medical: Not on file     Non-medical: Not on file   Tobacco Use    Smoking status: Former Smoker     Types: Cigarettes     Last attempt to quit: 8/3/1994     Years since quittin.2    Smokeless tobacco: Never Used   Substance and Sexual Activity    Alcohol use: No    Drug use: No    Sexual activity: Yes     Partners: Male     Birth control/protection: None, See Surgical Hx   Lifestyle    Physical activity:     Days per week: Not on file     Minutes per session: Not on file    Stress: Not on file   Relationships    Social connections:     Talks on phone: Not on file     Gets together: Not on file     Attends [employer]  "service: Not on file     Active member of club or organization: Not on file     Attends meetings of clubs or organizations: Not on file     Relationship status: Not on file   Other Topics Concern    Not on file   Social History Narrative    Not on file     Review of Systems   Constitutional: Negative for fatigue and fever.   HENT: Positive for postnasal drip, rhinorrhea, sore throat (irritated) and trouble swallowing (first thing in the morning due to dry mouth and throat). Negative for sinus pressure, sinus pain, sneezing and voice change.    Eyes: Negative for visual disturbance.   Respiratory: Negative for cough, chest tightness, shortness of breath and wheezing.    Cardiovascular: Negative for chest pain, palpitations and leg swelling.   Gastrointestinal: Negative for abdominal pain, diarrhea, nausea and vomiting.   Musculoskeletal: Negative for arthralgias, back pain and gait problem.   Skin: Negative for rash and wound.   Neurological: Negative for headaches.   Psychiatric/Behavioral: Negative for decreased concentration, dysphoric mood and sleep disturbance. The patient is not nervous/anxious.        Objective:      /76   Pulse 92   Temp 98.1 °F (36.7 °C) (Oral)   Resp 16   Ht 5' 4" (1.626 m)   Wt 76.3 kg (168 lb 3.4 oz)   LMP 09/08/2019   SpO2 96%   BMI 28.87 kg/m²      Physical Exam   Constitutional: She is oriented to person, place, and time. She appears well-developed and well-nourished. No distress.   HENT:   Head: Normocephalic.   Right Ear: Tympanic membrane, external ear and ear canal normal.   Left Ear: Tympanic membrane, external ear and ear canal normal.   Nose: Rhinorrhea present. No mucosal edema. Right sinus exhibits no maxillary sinus tenderness and no frontal sinus tenderness. Left sinus exhibits no maxillary sinus tenderness and no frontal sinus tenderness.   Mouth/Throat: Uvula is midline, oropharynx is clear and moist and mucous membranes are normal.   Eyes: Pupils are " equal, round, and reactive to light. Conjunctivae are normal.   Neck: Trachea normal and normal range of motion. Neck supple. No tracheal tenderness present. No tracheal deviation and no edema present. No thyromegaly present.   Cardiovascular: Normal rate, regular rhythm and normal heart sounds. Exam reveals no gallop.   No murmur heard.  Pulmonary/Chest: Breath sounds normal. No stridor. No respiratory distress. She has no wheezes. She has no rhonchi. She has no rales.   Abdominal: Soft. Normal appearance and bowel sounds are normal. She exhibits no mass. There is no tenderness. There is no rebound.   Musculoskeletal: Normal range of motion.   Gait and coordination normal.  strong, equal.  Upper and lower extremity strength is normal.    Lymphadenopathy:        Head (right side): No submandibular and no tonsillar adenopathy present.        Head (left side): No submandibular and no tonsillar adenopathy present.     She has no cervical adenopathy.   Neurological: She is alert and oriented to person, place, and time. She has normal strength.   Skin: Skin is warm and dry. Capillary refill takes less than 2 seconds. No lesion and no rash noted.   Psychiatric: She has a normal mood and affect. Her speech is normal and behavior is normal.       Assessment:       1. Allergic rhinitis due to pollen, unspecified seasonality    2. Laboratory exam ordered as part of routine general medical examination    3. Dry mouth        Plan:       Allergic rhinitis due to pollen, unspecified seasonality: use flonase daily. Normal saline irrigation before bedtime.    Laboratory exam ordered as part of routine general medical examination  -     CBC auto differential; Future; Expected date: 10/18/2019  -     Comprehensive metabolic panel; Future; Expected date: 10/18/2019  -     Lipid panel; Future; Expected date: 10/18/2019  -     TSH; Future; Expected date: 10/18/2019    Dry mouth:  States the only thing that has changed in her  environment is they had air conditioner problems and she started sleeping with the ceiling fan own.  Air conditioner has been fixed but she is still sleeping under the fan.  The symptoms started about the same time.  I discussed that in my opinion she has allergies, postnasal drip and sleeping with her mouth open and the ceiling fan on it is drying her out.  This causes the sore throat, dry throat and gets better after she drinks fluids and then is gone for the rest of the day.  Try sleeping without the ceiling fan and see if symptoms improve.  If not, follow up.    Other orders  -     Influenza - Quadrivalent (PF)       Continue current medication  Take medications only as prescribed  Healthy diet, exercise  Adequate rest  Adequate hydration  Avoid allergens  Avoid excessive caffeine     Follow up in 1 week if symptoms not improving.     This note was created using [employer] voice recognition software that occasionally misinterpreted phrases or words

## 2019-10-21 ENCOUNTER — LAB VISIT (OUTPATIENT)
Dept: LAB | Facility: HOSPITAL | Age: 54
End: 2019-10-21
Payer: COMMERCIAL

## 2019-10-21 ENCOUNTER — OFFICE VISIT (OUTPATIENT)
Dept: SURGERY | Facility: CLINIC | Age: 54
End: 2019-10-21
Payer: COMMERCIAL

## 2019-10-21 VITALS
SYSTOLIC BLOOD PRESSURE: 119 MMHG | RESPIRATION RATE: 16 BRPM | WEIGHT: 175.25 LBS | TEMPERATURE: 98 F | HEART RATE: 83 BPM | HEIGHT: 64 IN | BODY MASS INDEX: 29.92 KG/M2 | DIASTOLIC BLOOD PRESSURE: 66 MMHG

## 2019-10-21 DIAGNOSIS — Z00.00 LABORATORY EXAM ORDERED AS PART OF ROUTINE GENERAL MEDICAL EXAMINATION: ICD-10-CM

## 2019-10-21 DIAGNOSIS — K44.9 HIATAL HERNIA: Primary | ICD-10-CM

## 2019-10-21 LAB
ALBUMIN SERPL BCP-MCNC: 4 G/DL (ref 3.5–5.2)
ALP SERPL-CCNC: 85 U/L (ref 55–135)
ALT SERPL W/O P-5'-P-CCNC: 17 U/L (ref 10–44)
ANION GAP SERPL CALC-SCNC: 8 MMOL/L (ref 8–16)
AST SERPL-CCNC: 17 U/L (ref 10–40)
BASOPHILS # BLD AUTO: 0.06 K/UL (ref 0–0.2)
BASOPHILS NFR BLD: 1.3 % (ref 0–1.9)
BILIRUB SERPL-MCNC: 0.3 MG/DL (ref 0.1–1)
BUN SERPL-MCNC: 13 MG/DL (ref 6–20)
CALCIUM SERPL-MCNC: 9.3 MG/DL (ref 8.7–10.5)
CHLORIDE SERPL-SCNC: 105 MMOL/L (ref 95–110)
CHOLEST SERPL-MCNC: 212 MG/DL (ref 120–199)
CHOLEST/HDLC SERPL: 4.4 {RATIO} (ref 2–5)
CO2 SERPL-SCNC: 27 MMOL/L (ref 23–29)
CREAT SERPL-MCNC: 1 MG/DL (ref 0.5–1.4)
DIFFERENTIAL METHOD: ABNORMAL
EOSINOPHIL # BLD AUTO: 0.2 K/UL (ref 0–0.5)
EOSINOPHIL NFR BLD: 3.7 % (ref 0–8)
ERYTHROCYTE [DISTWIDTH] IN BLOOD BY AUTOMATED COUNT: 12.1 % (ref 11.5–14.5)
EST. GFR  (AFRICAN AMERICAN): >60 ML/MIN/1.73 M^2
EST. GFR  (NON AFRICAN AMERICAN): >60 ML/MIN/1.73 M^2
GLUCOSE SERPL-MCNC: 98 MG/DL (ref 70–110)
HCT VFR BLD AUTO: 43.8 % (ref 37–48.5)
HDLC SERPL-MCNC: 48 MG/DL (ref 40–75)
HDLC SERPL: 22.6 % (ref 20–50)
HGB BLD-MCNC: 13.8 G/DL (ref 12–16)
IMM GRANULOCYTES # BLD AUTO: 0.02 K/UL (ref 0–0.04)
IMM GRANULOCYTES NFR BLD AUTO: 0.4 % (ref 0–0.5)
LDLC SERPL CALC-MCNC: 104.8 MG/DL (ref 63–159)
LYMPHOCYTES # BLD AUTO: 1.3 K/UL (ref 1–4.8)
LYMPHOCYTES NFR BLD: 27.7 % (ref 18–48)
MCH RBC QN AUTO: 29.7 PG (ref 27–31)
MCHC RBC AUTO-ENTMCNC: 31.5 G/DL (ref 32–36)
MCV RBC AUTO: 94 FL (ref 82–98)
MONOCYTES # BLD AUTO: 0.4 K/UL (ref 0.3–1)
MONOCYTES NFR BLD: 9.7 % (ref 4–15)
NEUTROPHILS # BLD AUTO: 2.6 K/UL (ref 1.8–7.7)
NEUTROPHILS NFR BLD: 57.2 % (ref 38–73)
NONHDLC SERPL-MCNC: 164 MG/DL
NRBC BLD-RTO: 0 /100 WBC
PLATELET # BLD AUTO: 219 K/UL (ref 150–350)
PMV BLD AUTO: 10 FL (ref 9.2–12.9)
POTASSIUM SERPL-SCNC: 4.4 MMOL/L (ref 3.5–5.1)
PROT SERPL-MCNC: 7.1 G/DL (ref 6–8.4)
RBC # BLD AUTO: 4.65 M/UL (ref 4–5.4)
SODIUM SERPL-SCNC: 140 MMOL/L (ref 136–145)
TRIGL SERPL-MCNC: 296 MG/DL (ref 30–150)
TSH SERPL DL<=0.005 MIU/L-ACNC: 1.45 UIU/ML (ref 0.4–4)
WBC # BLD AUTO: 4.55 K/UL (ref 3.9–12.7)

## 2019-10-21 PROCEDURE — 99204 OFFICE O/P NEW MOD 45 MIN: CPT | Mod: S$GLB,,, | Performed by: SURGERY

## 2019-10-21 PROCEDURE — 99999 PR PBB SHADOW E&M-EST. PATIENT-LVL III: ICD-10-PCS | Mod: PBBFAC,,, | Performed by: SURGERY

## 2019-10-21 PROCEDURE — 84443 ASSAY THYROID STIM HORMONE: CPT

## 2019-10-21 PROCEDURE — 36415 COLL VENOUS BLD VENIPUNCTURE: CPT | Mod: PO

## 2019-10-21 PROCEDURE — 99204 PR OFFICE/OUTPT VISIT, NEW, LEVL IV, 45-59 MIN: ICD-10-PCS | Mod: S$GLB,,, | Performed by: SURGERY

## 2019-10-21 PROCEDURE — 80061 LIPID PANEL: CPT

## 2019-10-21 PROCEDURE — 85025 COMPLETE CBC W/AUTO DIFF WBC: CPT

## 2019-10-21 PROCEDURE — 99999 PR PBB SHADOW E&M-EST. PATIENT-LVL III: CPT | Mod: PBBFAC,,, | Performed by: SURGERY

## 2019-10-21 PROCEDURE — 80053 COMPREHEN METABOLIC PANEL: CPT

## 2019-10-21 NOTE — LETTER
October 21, 2019      Gerda Webb, CHELLE  [address] [address] [address]           [facility] [address] [place] [address]  Phone: [phone]          Patient: Lavern Hodges   MR Number: [id number]   YOB: 1965   Date of Visit: 10/21/2019       Dear Gerda Webb:    Thank you for referring Lavern Hodges to me for evaluation. Attached you will find relevant portions of my assessment and plan of care.    If you have questions, please do not hesitate to call me. I look forward to following Lavern Hodges along with you.    Sincerely,    Jhonatan Soto MD    Enclosure  CC:  No Recipients    If you would like to receive this communication electronically, please contact [email or web address] or [phone] to request more information on "[employer] Link access.    For providers and/or their staff who would like to refer a patient to [facility], please contact us through our one-stop-shop provider referral line, Andrzej Grubbs, at [phone].    If you feel you have received this communication in error or would no longer like to receive these types of communications, please e-mail [email or web address]

## 2019-10-21 NOTE — PROGRESS NOTES
Subjective:       Patient ID: Lavern Hodges is a 54 y.o. female.    Chief Complaint: No chief complaint on file.      HPI 54-year-old female referred for evaluation of a hiatal hernia. Patient states that she had reflux most days.  She cannot eat late at night.  When she does not eat late at night she does fairly well though she still struggles with reflux.  She denies any chest pain.  She denies any dysphagia.  She takes Nexium and Bentyl.  EGD few years ago showed a small to moderate size hiatal hernia.  Recent CT shows what appears to be an enlargement of her hiatal hernia. She says she wakes up at night with a choking sensation.  She has never aspirated.  She does not have a chronic cough.    Past Medical History:   Diagnosis Date    Allergy     Anxiety     Asthma     due to allergies     GERD (gastroesophageal reflux disease)     History of nephrolithiasis     Irritable bowel syndrome     Kidney stone     lithotripsy 20 years ago    Peptic ulcer disease     PONV (postoperative nausea and vomiting)     Colin syndrome     Vitamin D deficiency      Past Surgical History:   Procedure Laterality Date     SECTION      times 2    COLONOSCOPY  2016    repeat in 10 years. clear.  internal/external hemorrhoids only.    COLONOSCOPY N/A 2016    Procedure: COLONOSCOPY;  Surgeon: Sherman Arevalo MD;  Location: [facility];  Service: Endoscopy;  Laterality: N/A;    HYSTEROSCOPY      TUBAL LIGATION      UPPER GASTROINTESTINAL ENDOSCOPY  10/29/2010    Dr. Arevalo: mil schatzki ring- dilated, gastric polyp removed, otherwise normal findings. repeat PRN    WISDOM TOOTH EXTRACTION           Current Outpatient Medications:     dicyclomine (BENTYL) 20 mg tablet, TAKE 1 TABLET(20 MG) BY MOUTH FOUR TIMES DAILY prn, Disp: 120 tablet, Rfl: 2    esomeprazole (NEXIUM) 40 MG capsule, TAKE 1 CAPSULE BY MOUTH ONCE DAILY IN THE EVENING, Disp: 90 capsule, Rfl: 2    progesterone (PROMETRIUM) 200  MG capsule, TK 1 C PO QHS ON DAYS 1 THROUGH 12 OF THE MONTH ON CYCLE, Disp: , Rfl: 4    acetaminophen (TYLENOL) 325 MG tablet, Every 4-6 hours PRN, Disp: , Rfl:     clonazePAM (KLONOPIN) 0.5 MG tablet, Take 1 tablet (0.5 mg total) by mouth nightly as needed for Anxiety. (Patient not taking: Reported on 10/21/2019), Disp: 20 tablet, Rfl: 0    ibuprofen (IBUPROFEN IB) 200 MG tablet, Take 100 mg by mouth as needed for Pain., Disp: , Rfl:     valACYclovir (VALTREX) 500 MG tablet, Take 1 tablet (500 mg total) by mouth 2 (two) times daily as needed. (Patient not taking: Reported on 10/21/2019), Disp: 30 tablet, Rfl: 4    Review of patient's allergies indicates:   Allergen Reactions    Cefaclor Hives    Codeine Hives       Family History   Problem Relation Age of Onset    Alzheimer's disease Maternal Grandmother     Colon cancer Maternal Aunt         diagnosed in her 70's    Cancer Maternal Aunt         colon CA    Crohn's disease Neg Hx     Ulcerative colitis Neg Hx      Social History     Socioeconomic History    Marital status:      Spouse name: Not on file    Number of children: Not on file    Years of education: Not on file    Highest education level: Not on file   Occupational History    Not on file   Social Needs    Financial resource strain: Not on file    Food insecurity:     Worry: Not on file     Inability: Not on file    Transportation needs:     Medical: Not on file     Non-medical: Not on file   Tobacco Use    Smoking status: Former Smoker     Types: Cigarettes     Last attempt to quit: 8/3/1994     Years since quittin.2    Smokeless tobacco: Never Used   Substance and Sexual Activity    Alcohol use: No    Drug use: No    Sexual activity: Yes     Partners: Male     Birth control/protection: None, See Surgical Hx   Lifestyle    Physical activity:     Days per week: Not on file     Minutes per session: Not on file    Stress: Not on file   Relationships    Social  connections:     Talks on phone: Not on file     Gets together: Not on file     Attends Mosque service: Not on file     Active member of club or organization: Not on file     Attends meetings of clubs or organizations: Not on file     Relationship status: Not on file   Other Topics Concern    Not on file   Social History Narrative    Not on file       Review of Systems   Constitutional: Negative for activity change, chills, fever and unexpected weight change.   HENT: Negative for congestion, sore throat, trouble swallowing and voice change.    Eyes: Negative for redness and visual disturbance.   Respiratory: Negative for cough, shortness of breath and wheezing.    Cardiovascular: Negative for chest pain and palpitations.   Gastrointestinal: Negative for abdominal pain, blood in stool, nausea and vomiting.        Reflux.   Endocrine: Negative.    Genitourinary: Negative for dysuria, frequency and hematuria.   Musculoskeletal: Negative for arthralgias, back pain and neck pain.   Skin: Negative for rash and wound.   Allergic/Immunologic: Negative.    Neurological: Negative for dizziness, weakness and headaches.   Hematological: Negative for adenopathy.   Psychiatric/Behavioral: Negative for agitation and dysphoric mood. The patient is not nervous/anxious.      Objective:     Physical Exam   Constitutional: She is oriented to person, place, and time. She appears well-developed and well-nourished. No distress.   HENT:   Head: Normocephalic and atraumatic.   Mouth/Throat: Oropharynx is clear and moist. No oropharyngeal exudate.   Eyes: Pupils are equal, round, and reactive to light. Conjunctivae and EOM are normal. No scleral icterus.   Neck: Normal range of motion. No thyromegaly present.   Cardiovascular: Normal rate and regular rhythm.   No murmur heard.  Pulmonary/Chest: Effort normal and breath sounds normal. She has no wheezes. She has no rales.   Abdominal: Soft. Bowel sounds are normal. She exhibits no  distension and no mass. There is no tenderness. No hernia.   Musculoskeletal: Normal range of motion. She exhibits no edema.   Lymphadenopathy:     She has no cervical adenopathy.   Neurological: She is alert and oriented to person, place, and time. No cranial nerve deficit.   Skin: Skin is warm and dry. No rash noted. No erythema.   Psychiatric: She has a normal mood and affect. Her behavior is normal.     CT scan report and images reviewed.  EGD report and images reviewed.    Assessment:     Encounter Diagnosis   Name Primary?    Hiatal hernia Yes       Plan:      1.  We discussed the option of laparoscopic Nissen fundoplication.  She does have a substantial hiatal hernia at this time.  Her symptoms are relatively minor although persistent with treatment.  2.  We discussed the preop testing that would be necessary.  She will consider this and call when she is ready to proceed.  3.  She says she would like to discuss her medications with Gastroenterology

## 2019-10-22 ENCOUNTER — PATIENT MESSAGE (OUTPATIENT)
Dept: FAMILY MEDICINE | Facility: CLINIC | Age: 54
End: 2019-10-22

## 2019-10-23 ENCOUNTER — PATIENT MESSAGE (OUTPATIENT)
Dept: FAMILY MEDICINE | Facility: CLINIC | Age: 54
End: 2019-10-23

## 2019-10-23 RX ORDER — FENOFIBRATE 160 MG/1
160 TABLET ORAL DAILY
Qty: 90 TABLET | Refills: 3 | Status: SHIPPED | OUTPATIENT
Start: 2019-10-23 | End: 2020-10-10

## 2019-12-06 ENCOUNTER — OFFICE VISIT (OUTPATIENT)
Dept: FAMILY MEDICINE | Facility: CLINIC | Age: 54
End: 2019-12-06
Payer: COMMERCIAL

## 2019-12-06 VITALS
TEMPERATURE: 99 F | HEIGHT: 64 IN | BODY MASS INDEX: 29.52 KG/M2 | SYSTOLIC BLOOD PRESSURE: 100 MMHG | OXYGEN SATURATION: 96 % | WEIGHT: 172.94 LBS | DIASTOLIC BLOOD PRESSURE: 56 MMHG | HEART RATE: 90 BPM | RESPIRATION RATE: 18 BRPM

## 2019-12-06 DIAGNOSIS — J01.00 ACUTE MAXILLARY SINUSITIS, RECURRENCE NOT SPECIFIED: Primary | ICD-10-CM

## 2019-12-06 DIAGNOSIS — R51.9 SINUS HEADACHE: ICD-10-CM

## 2019-12-06 PROCEDURE — 3008F BODY MASS INDEX DOCD: CPT | Mod: CPTII,S$GLB,, | Performed by: NURSE PRACTITIONER

## 2019-12-06 PROCEDURE — 99214 PR OFFICE/OUTPT VISIT, EST, LEVL IV, 30-39 MIN: ICD-10-PCS | Mod: S$GLB,,, | Performed by: NURSE PRACTITIONER

## 2019-12-06 PROCEDURE — 99214 OFFICE O/P EST MOD 30 MIN: CPT | Mod: S$GLB,,, | Performed by: NURSE PRACTITIONER

## 2019-12-06 PROCEDURE — 3008F PR BODY MASS INDEX (BMI) DOCUMENTED: ICD-10-PCS | Mod: CPTII,S$GLB,, | Performed by: NURSE PRACTITIONER

## 2019-12-06 RX ORDER — AZITHROMYCIN 250 MG/1
TABLET, FILM COATED ORAL
Qty: 6 TABLET | Refills: 0 | Status: SHIPPED | OUTPATIENT
Start: 2019-12-06 | End: 2019-12-11

## 2019-12-06 NOTE — PROGRESS NOTES
Subjective:       Patient ID: Lavern Hodges is a 54 y.o. female.    Chief Complaint: Sinus congestion (Symptom started Tuesday); Cough; and Sore Throat    HPI onset over the past week, got worse Tuesday. Taking Robitussin DM. See ROS.    The following portion of the patients history was reviewed and updated as appropriate: allergies, current medications, past medical and surgical history. Past social history and problem list reviewed. Family PMH and Past social history reviewed. Tobacco, Illicit drug use reviewed.      Review of patient's allergies indicates:   Allergen Reactions    Cefaclor Hives    Codeine Hives         Current Outpatient Medications:     dicyclomine (BENTYL) 20 mg tablet, TAKE 1 TABLET(20 MG) BY MOUTH FOUR TIMES DAILY prn, Disp: 120 tablet, Rfl: 2    esomeprazole (NEXIUM) 40 MG capsule, TAKE 1 CAPSULE BY MOUTH ONCE DAILY IN THE EVENING, Disp: 90 capsule, Rfl: 2    ibuprofen (IBUPROFEN IB) 200 MG tablet, Take 100 mg by mouth as needed for Pain., Disp: , Rfl:     progesterone (PROMETRIUM) 200 MG capsule, TK 1 C PO QHS ON DAYS 1 THROUGH 12 OF THE MONTH ON CYCLE, Disp: , Rfl: 4    acetaminophen (TYLENOL) 325 MG tablet, Every 4-6 hours PRN, Disp: , Rfl:     clonazePAM (KLONOPIN) 0.5 MG tablet, Take 1 tablet (0.5 mg total) by mouth nightly as needed for Anxiety. (Patient not taking: Reported on 10/21/2019), Disp: 20 tablet, Rfl: 0    fenofibrate 160 MG Tab, Take 1 tablet (160 mg total) by mouth once daily. (Patient not taking: Reported on 12/6/2019), Disp: 90 tablet, Rfl: 3    valACYclovir (VALTREX) 500 MG tablet, Take 1 tablet (500 mg total) by mouth 2 (two) times daily as needed. (Patient not taking: Reported on 10/21/2019), Disp: 30 tablet, Rfl: 4    Past Medical History:   Diagnosis Date    Allergy     Anxiety     Asthma     due to allergies     GERD (gastroesophageal reflux disease)     History of nephrolithiasis     Irritable bowel syndrome     Kidney stone     lithotripsy 20  years ago    Peptic ulcer disease     PONV (postoperative nausea and vomiting)     Colin syndrome     Vitamin D deficiency        Past Surgical History:   Procedure Laterality Date     SECTION      times 2    COLONOSCOPY  2016    repeat in 10 years. clear.  internal/external hemorrhoids only.    COLONOSCOPY N/A 2016    Procedure: COLONOSCOPY;  Surgeon: Sherman Arevalo MD;  Location: [facility];  Service: Endoscopy;  Laterality: N/A;    HYSTEROSCOPY      TUBAL LIGATION      UPPER GASTROINTESTINAL ENDOSCOPY  10/29/2010    Dr. Arevalo: mil juanjotzki ring- dilated, gastric polyp removed, otherwise normal findings. repeat PRN    WISDOM TOOTH EXTRACTION         Social History     Socioeconomic History    Marital status:      Spouse name: Not on file    Number of children: Not on file    Years of education: Not on file    Highest education level: Not on file   Occupational History    Not on file   Social Needs    Financial resource strain: Not on file    Food insecurity:     Worry: Not on file     Inability: Not on file    Transportation needs:     Medical: Not on file     Non-medical: Not on file   Tobacco Use    Smoking status: Former Smoker     Types: Cigarettes     Last attempt to quit: 8/3/1994     Years since quittin.3    Smokeless tobacco: Never Used   Substance and Sexual Activity    Alcohol use: No    Drug use: No    Sexual activity: Yes     Partners: Male     Birth control/protection: None, See Surgical Hx   Lifestyle    Physical activity:     Days per week: Not on file     Minutes per session: Not on file    Stress: Not on file   Relationships    Social connections:     Talks on phone: Not on file     Gets together: Not on file     Attends [employer] service: Not on file     Active member of club or organization: Not on file     Attends meetings of clubs or organizations: Not on file     Relationship status: Not on file   Other Topics Concern     "Not on file   Social History Narrative    Not on file     Review of Systems   Constitutional: Positive for fatigue. Negative for appetite change and fever.   HENT: Positive for congestion, postnasal drip, rhinorrhea, sinus pressure, sinus pain, sneezing and sore throat. Negative for ear discharge and ear pain.    Eyes: Negative for redness and itching.   Respiratory: Positive for cough. Negative for chest tightness, shortness of breath and wheezing.    Cardiovascular: Negative for chest pain, palpitations and leg swelling.   Gastrointestinal: Negative for abdominal pain, diarrhea, nausea and vomiting.   Genitourinary: Negative for dysuria and frequency.   Musculoskeletal: Negative for arthralgias, back pain, gait problem and myalgias.   Skin: Negative.    Neurological: Positive for headaches. Negative for dizziness and weakness.   Psychiatric/Behavioral: Negative.        Objective:      BP (!) 100/56   Pulse 90   Temp 98.5 °F (36.9 °C) (Oral)   Resp 18   Ht 5' 4" (1.626 m)   Wt 78.4 kg (172 lb 15.2 oz)   LMP 12/04/2019   SpO2 96%   BMI 29.69 kg/m²      Physical Exam   Constitutional: She is oriented to person, place, and time. She appears well-developed and well-nourished. No distress.   HENT:   Head: Normocephalic.   Right Ear: External ear and ear canal normal. No drainage. Tympanic membrane is injected.   Left Ear: External ear and ear canal normal. No drainage. Tympanic membrane is injected.   Nose: Rhinorrhea present. Right sinus exhibits maxillary sinus tenderness. Right sinus exhibits no frontal sinus tenderness. Left sinus exhibits maxillary sinus tenderness. Left sinus exhibits no frontal sinus tenderness.   Mouth/Throat: Uvula is midline and mucous membranes are normal. Posterior oropharyngeal erythema present. No oropharyngeal exudate or tonsillar abscesses. No tonsillar exudate.   Eyes: Pupils are equal, round, and reactive to light. Conjunctivae and lids are normal. Right eye exhibits no " discharge and no exudate. Left eye exhibits no discharge and no exudate.   Neck: Trachea normal and normal range of motion. Neck supple. No JVD present. Carotid bruit is not present. No neck rigidity. No thyroid mass and no thyromegaly present.   Cardiovascular: Normal rate, regular rhythm and normal heart sounds. Exam reveals no gallop.   No murmur heard.  Pulmonary/Chest: No stridor. No respiratory distress. She has no decreased breath sounds. She has no wheezes. She has no rhonchi. She has no rales.   Abdominal: Soft. Bowel sounds are normal. She exhibits no distension. There is no hepatosplenomegaly. There is no tenderness.   Musculoskeletal:   Gait and coordination normal.  strong, equal.  Upper and lower extremity strength is normal.    Lymphadenopathy:        Head (right side): Submandibular adenopathy present.        Head (left side): Submandibular adenopathy present.   Neurological: She is alert and oriented to person, place, and time.   Skin: Skin is warm and dry. Capillary refill takes less than 2 seconds. No rash noted. She is not diaphoretic.   Psychiatric: She has a normal mood and affect. Her speech is normal and behavior is normal.       Assessment:       1. Acute maxillary sinusitis, recurrence not specified    2. Sinus headache        Plan:       Acute maxillary sinusitis, recurrence not specified: Due to duration and presenting exam will cover with antibiotics. Take as directed. Complete full course of medication.    Sinus headache:  Motrin/tylenol prn. Use flonase daily    Other orders  -     azithromycin (Z-ANJEL) 250 MG tablet; Take 2 tablets by mouth on day 1; Take 1 tablet by mouth on days 2-5  Dispense: 6 tablet; Refill: 0     Continue current medication  Take medications only as prescribed  Healthy diet  Adequate rest  Adequate hydration  Avoid allergens  Avoid excessive caffeine     Follow up in 1 week if symptoms are not improving

## 2019-12-10 ENCOUNTER — TELEPHONE (OUTPATIENT)
Dept: FAMILY MEDICINE | Facility: CLINIC | Age: 54
End: 2019-12-10

## 2019-12-10 RX ORDER — ALBUTEROL SULFATE 90 UG/1
2 AEROSOL, METERED RESPIRATORY (INHALATION) EVERY 6 HOURS PRN
Qty: 18 G | Refills: 0 | Status: SHIPPED | OUTPATIENT
Start: 2019-12-10 | End: 2020-03-17

## 2019-12-10 RX ORDER — FLUTICASONE PROPIONATE AND SALMETEROL XINAFOATE 230; 21 UG/1; UG/1
AEROSOL, METERED RESPIRATORY (INHALATION)
Qty: 12 G | Refills: 0 | Status: SHIPPED | OUTPATIENT
Start: 2019-12-10 | End: 2020-10-10

## 2019-12-10 NOTE — TELEPHONE ENCOUNTER
Patient states she has previously used albuterol inhaler and was advised by the pharmacy that new prescription advair inhaler includes steroids. Patient is unsure if advair is the appropriate inhaler for her current symptoms (morning phlegm and congestion), and is also $75 OOP cost, and she would like to know if a steroid shot and albuterol inhaler would be better. Please advise.

## 2019-12-10 NOTE — TELEPHONE ENCOUNTER
Returned patient's call, states she was seen on 12/6 and was advised to return call to clinic if no improvement. States she is still experiencing chest/sinus congestion, productive cough with a slight wheeze and slight SOB, but is taking robitussin and abx as directed. Patient would like to know if there is an inhaler that may be prescribed to help alleviate symptoms, or if provider has any additional suggestions. Please advise.

## 2019-12-10 NOTE — TELEPHONE ENCOUNTER
We can do it that way to save her money.  It is what I recommended but the other will work just as well. Can put her on the nurse schedule tomorrow to come in for injection. Will send the albuterol to the pharmacy.

## 2019-12-10 NOTE — TELEPHONE ENCOUNTER
----- Message from Juana Peraza sent at 12/10/2019  2:11 PM CST -----  Contact: pt  States she would like to speak to the nurse regarding the advair that was called in. She would like to discuss her symptoms with the nurse. Please call pt [phone]. Thank you

## 2019-12-10 NOTE — TELEPHONE ENCOUNTER
----- Message from Mariluz Lambert sent at 12/10/2019  7:39 AM CST -----  Type: Needs Medical Advice    Who Called:  Patient  Symptoms (please be specific):  Still congested, cough, phlegm  How long has patient had these symptoms:  2 weeks  Pharmacy name and phone #:    [id number] - [facility] [id number] [place] [address] AT [facility] [address]  [address] [place] [id number]  Phone: [phone] Fax: [phone]  Best Call Back Number: [phone] (home)     Additional Information: Wants to know what she can do next. Was seen in office on 12/6/19. Please call to advise.

## 2019-12-10 NOTE — TELEPHONE ENCOUNTER
Patient notified of provider response, verbalized understanding. Nurse visit scheduled, patient aware of date/time.

## 2019-12-11 ENCOUNTER — CLINICAL SUPPORT (OUTPATIENT)
Dept: FAMILY MEDICINE | Facility: CLINIC | Age: 54
End: 2019-12-11
Payer: COMMERCIAL

## 2019-12-11 DIAGNOSIS — R06.2 WHEEZING: Primary | ICD-10-CM

## 2019-12-11 PROCEDURE — 96372 THER/PROPH/DIAG INJ SC/IM: CPT | Mod: S$GLB,,, | Performed by: NURSE PRACTITIONER

## 2019-12-11 PROCEDURE — 96372 PR INJECTION,THERAP/PROPH/DIAG2ST, IM OR SUBCUT: ICD-10-PCS | Mod: S$GLB,,, | Performed by: NURSE PRACTITIONER

## 2019-12-11 RX ORDER — DEXAMETHASONE SODIUM PHOSPHATE 4 MG/ML
8 INJECTION, SOLUTION INTRA-ARTICULAR; INTRALESIONAL; INTRAMUSCULAR; INTRAVENOUS; SOFT TISSUE ONCE
Status: COMPLETED | OUTPATIENT
Start: 2019-12-11 | End: 2019-12-11

## 2019-12-11 RX ADMIN — DEXAMETHASONE SODIUM PHOSPHATE 8 MG: 4 INJECTION, SOLUTION INTRA-ARTICULAR; INTRALESIONAL; INTRAMUSCULAR; INTRAVENOUS; SOFT TISSUE at 09:12

## 2019-12-12 ENCOUNTER — TELEPHONE (OUTPATIENT)
Dept: FAMILY MEDICINE | Facility: CLINIC | Age: 54
End: 2019-12-12

## 2019-12-12 DIAGNOSIS — R06.2 WHEEZING: Primary | ICD-10-CM

## 2019-12-12 RX ORDER — LEVOFLOXACIN 500 MG/1
500 TABLET, FILM COATED ORAL DAILY
Qty: 10 TABLET | Refills: 0 | Status: SHIPPED | OUTPATIENT
Start: 2019-12-12 | End: 2019-12-22

## 2019-12-12 RX ORDER — PREDNISONE 20 MG/1
TABLET ORAL
Qty: 10 TABLET | Refills: 0 | Status: SHIPPED | OUTPATIENT
Start: 2019-12-12 | End: 2020-10-10 | Stop reason: ALTCHOICE

## 2019-12-12 NOTE — TELEPHONE ENCOUNTER
She needs steroids and different antibiotics. I will send to the pharmacy if she will take them. She needs to also go get CXR orders put In, can schedule. Medications sent to pharmacy

## 2019-12-12 NOTE — TELEPHONE ENCOUNTER
----- Message from Kayla Tran sent at 12/12/2019  2:11 PM CST -----  Contact: pt  ..Type: Needs Medical Advice    Who Called:  Pt  Best Call Back Number: .[phone] (home)   Additional Information:Pt stated she was seen for a nurse visit but is still experiencing some coughing and wheezing, needs advise on what to do  Please advise  Thanks

## 2019-12-12 NOTE — TELEPHONE ENCOUNTER
Patient scheduled follow up appt with you for tomorrow, 12/13 but she is still coughing/wheezing. Please advise with any additional recommendations prior to her office visit.

## 2019-12-13 ENCOUNTER — OFFICE VISIT (OUTPATIENT)
Dept: FAMILY MEDICINE | Facility: CLINIC | Age: 54
End: 2019-12-13
Payer: COMMERCIAL

## 2019-12-13 ENCOUNTER — HOSPITAL ENCOUNTER (OUTPATIENT)
Dept: RADIOLOGY | Facility: HOSPITAL | Age: 54
Discharge: HOME | End: 2019-12-13
Attending: NURSE PRACTITIONER
Payer: COMMERCIAL

## 2019-12-13 VITALS
TEMPERATURE: 98 F | RESPIRATION RATE: 18 BRPM | BODY MASS INDEX: 29.47 KG/M2 | SYSTOLIC BLOOD PRESSURE: 120 MMHG | DIASTOLIC BLOOD PRESSURE: 70 MMHG | HEART RATE: 78 BPM | WEIGHT: 172.63 LBS | HEIGHT: 64 IN | OXYGEN SATURATION: 97 %

## 2019-12-13 DIAGNOSIS — R06.2 WHEEZING: ICD-10-CM

## 2019-12-13 DIAGNOSIS — J20.9 BRONCHITIS WITH BRONCHOSPASM: Primary | ICD-10-CM

## 2019-12-13 PROCEDURE — 71046 X-RAY EXAM CHEST 2 VIEWS: CPT | Mod: TC,PN

## 2019-12-13 PROCEDURE — 71046 X-RAY EXAM CHEST 2 VIEWS: CPT | Mod: 26,,, | Performed by: RADIOLOGY

## 2019-12-13 PROCEDURE — 99213 OFFICE O/P EST LOW 20 MIN: CPT | Mod: S$GLB,,, | Performed by: NURSE PRACTITIONER

## 2019-12-13 PROCEDURE — 99213 PR OFFICE/OUTPT VISIT, EST, LEVL III, 20-29 MIN: ICD-10-PCS | Mod: S$GLB,,, | Performed by: NURSE PRACTITIONER

## 2019-12-13 PROCEDURE — 71046 XR CHEST PA AND LATERAL: ICD-10-PCS | Mod: 26,,, | Performed by: RADIOLOGY

## 2019-12-13 NOTE — PROGRESS NOTES
Subjective:       Patient ID: Lavern Hodges is a 54 y.o. female.    Chief Complaint: Chest Congestion (Started Tuesday, chest xray done today); Wheezing; Cough; and Sinus Problem    HPI she has been started on antibiotics, steroids, inhaler, cough medication. Had CXR yesterday.  CXR is normal. States she has been using the inhaler and that has helped with the wheezing and SOB. Cough started getting better yesterday. States she feels she is finally starting to improve. See ROS.    The following portion of the patients history was reviewed and updated as appropriate: allergies, current medications, past medical and surgical history. Past social history and problem list reviewed. Family PMH and Past social history reviewed. Tobacco, Illicit drug use reviewed.      Review of patient's allergies indicates:   Allergen Reactions    Cefaclor Hives    Codeine Hives         Current Outpatient Medications:     acetaminophen (TYLENOL) 325 MG tablet, Every 4-6 hours PRN, Disp: , Rfl:     albuterol (VENTOLIN HFA) 90 mcg/actuation inhaler, Inhale 2 puffs into the lungs every 6 (six) hours as needed for Wheezing. Rescue, Disp: 18 g, Rfl: 0    clonazePAM (KLONOPIN) 0.5 MG tablet, Take 1 tablet (0.5 mg total) by mouth nightly as needed for Anxiety. (Patient not taking: Reported on 10/21/2019), Disp: 20 tablet, Rfl: 0    dicyclomine (BENTYL) 20 mg tablet, TAKE 1 TABLET(20 MG) BY MOUTH FOUR TIMES DAILY prn, Disp: 120 tablet, Rfl: 2    esomeprazole (NEXIUM) 40 MG capsule, TAKE 1 CAPSULE BY MOUTH ONCE DAILY IN THE EVENING, Disp: 90 capsule, Rfl: 2    fenofibrate 160 MG Tab, Take 1 tablet (160 mg total) by mouth once daily. (Patient not taking: Reported on 12/6/2019), Disp: 90 tablet, Rfl: 3    fluticasone-salmeterol 230-21 mcg/dose (ADVAIR HFA) 230-21 mcg/actuation HFAA inhaler, Inhale 2 puffs into lungs twice daily. Rinse and spit after each use., Disp: 12 g, Rfl: 0    ibuprofen (IBUPROFEN IB) 200 MG tablet, Take 100 mg by  mouth as needed for Pain., Disp: , Rfl:     levoFLOXacin (LEVAQUIN) 500 MG tablet, Take 1 tablet (500 mg total) by mouth once daily. for 10 days, Disp: 10 tablet, Rfl: 0    predniSONE (DELTASONE) 20 MG tablet, Two tablets in am for 3 days, then one for 3 days then 1/2 for 2 days., Disp: 10 tablet, Rfl: 0    progesterone (PROMETRIUM) 200 MG capsule, TK 1 C PO QHS ON DAYS 1 THROUGH 12 OF THE MONTH ON CYCLE, Disp: , Rfl: 4    valACYclovir (VALTREX) 500 MG tablet, Take 1 tablet (500 mg total) by mouth 2 (two) times daily as needed. (Patient not taking: Reported on 10/21/2019), Disp: 30 tablet, Rfl: 4    Past Medical History:   Diagnosis Date    Allergy     Anxiety     Asthma     due to allergies     GERD (gastroesophageal reflux disease)     History of nephrolithiasis     Irritable bowel syndrome     Kidney stone     lithotripsy 20 years ago    Peptic ulcer disease     PONV (postoperative nausea and vomiting)     Colin syndrome     Vitamin D deficiency        Past Surgical History:   Procedure Laterality Date     SECTION      times 2    COLONOSCOPY  2016    repeat in 10 years. clear.  internal/external hemorrhoids only.    COLONOSCOPY N/A 2016    Procedure: COLONOSCOPY;  Surgeon: Sherman Arevalo MD;  Location: [facility];  Service: Endoscopy;  Laterality: N/A;    HYSTEROSCOPY      TUBAL LIGATION      UPPER GASTROINTESTINAL ENDOSCOPY  10/29/2010    Dr. Arevalo: michael hernandez ring- dilated, gastric polyp removed, otherwise normal findings. repeat PRN    WISDOM TOOTH EXTRACTION         Social History     Socioeconomic History    Marital status:      Spouse name: Not on file    Number of children: Not on file    Years of education: Not on file    Highest education level: Not on file   Occupational History    Not on file   Social Needs    Financial resource strain: Not on file    Food insecurity:     Worry: Not on file     Inability: Not on file    Transportation  needs:     Medical: Not on file     Non-medical: Not on file   Tobacco Use    Smoking status: Former Smoker     Types: Cigarettes     Last attempt to quit: 8/3/1994     Years since quittin.3    Smokeless tobacco: Never Used   Substance and Sexual Activity    Alcohol use: No    Drug use: No    Sexual activity: Yes     Partners: Male     Birth control/protection: None, See Surgical Hx   Lifestyle    Physical activity:     Days per week: Not on file     Minutes per session: Not on file    Stress: Not on file   Relationships    Social connections:     Talks on phone: Not on file     Gets together: Not on file     Attends Religion service: Not on file     Active member of club or organization: Not on file     Attends meetings of clubs or organizations: Not on file     Relationship status: Not on file   Other Topics Concern    Not on file   Social History Narrative    Not on file     Review of Systems   Constitutional: Negative for fatigue and fever.   HENT: Positive for congestion, postnasal drip, rhinorrhea and sinus pressure. Negative for sinus pain, sore throat and voice change.    Eyes: Negative for visual disturbance.   Respiratory: Positive for cough, chest tightness and wheezing (improving). Negative for shortness of breath.    Cardiovascular: Negative for chest pain, palpitations and leg swelling.   Gastrointestinal: Negative for abdominal pain, diarrhea, nausea and vomiting.   Musculoskeletal: Negative for arthralgias, back pain and gait problem.   Neurological: Negative for dizziness, weakness and headaches.       Objective:      LMP 2019      Physical Exam   Constitutional: She is oriented to person, place, and time. She appears well-developed and well-nourished. No distress.   HENT:   Head: Normocephalic.   Right Ear: Tympanic membrane, external ear and ear canal normal.   Left Ear: Tympanic membrane, external ear and ear canal normal.   Nose: Rhinorrhea present. Right sinus exhibits no  maxillary sinus tenderness and no frontal sinus tenderness. Left sinus exhibits no maxillary sinus tenderness and no frontal sinus tenderness.   Mouth/Throat: Uvula is midline and mucous membranes are normal. No oropharyngeal exudate, posterior oropharyngeal edema or posterior oropharyngeal erythema.   Eyes: Pupils are equal, round, and reactive to light. Conjunctivae and EOM are normal.   Neck: Trachea normal and normal range of motion. Neck supple. No tracheal tenderness present. No tracheal deviation and no edema present. No thyromegaly present.   Cardiovascular: Normal rate, regular rhythm and normal heart sounds. Exam reveals no gallop.   No murmur heard.  Pulmonary/Chest: Breath sounds normal. No stridor. No respiratory distress. She has no wheezes. She has no rhonchi. She has no rales.   Musculoskeletal: Normal range of motion.   Gait and coordination normal   Lymphadenopathy:        Head (right side): No submandibular adenopathy present.        Head (left side): No submandibular adenopathy present.   Neurological: She is alert and oriented to person, place, and time. She has normal strength.   Skin: Skin is warm and dry. Capillary refill takes less than 2 seconds. No lesion and no rash noted.   Psychiatric: She has a normal mood and affect. Her speech is normal and behavior is normal.       Assessment:       1. Bronchitis with bronchospasm        Plan:       Bronchitis with bronchospasm: symptoms have improved. Finish treatment plan. Hydrate well. Rest.     Continue current medication  Take medications only as prescribed  Healthy diet, exercise  Adequate rest  Adequate hydration  Avoid allergens  Avoid excessive caffeine     follow up as needed.

## 2020-01-13 ENCOUNTER — TELEPHONE (OUTPATIENT)
Dept: FAMILY MEDICINE | Facility: CLINIC | Age: 55
End: 2020-01-13

## 2020-01-13 ENCOUNTER — HOSPITAL ENCOUNTER (OUTPATIENT)
Dept: RADIOLOGY | Facility: HOSPITAL | Age: 55
Discharge: HOME | End: 2020-01-13
Attending: NURSE PRACTITIONER
Payer: COMMERCIAL

## 2020-01-13 ENCOUNTER — PATIENT MESSAGE (OUTPATIENT)
Dept: FAMILY MEDICINE | Facility: CLINIC | Age: 55
End: 2020-01-13

## 2020-01-13 DIAGNOSIS — M54.50 ACUTE LEFT-SIDED LOW BACK PAIN WITHOUT SCIATICA: ICD-10-CM

## 2020-01-13 DIAGNOSIS — M54.50 ACUTE RIGHT-SIDED LOW BACK PAIN WITHOUT SCIATICA: ICD-10-CM

## 2020-01-13 PROCEDURE — 72110 XR LUMBAR SPINE 5 VIEW WITH FLEX AND EXT: ICD-10-PCS | Mod: 26,,, | Performed by: RADIOLOGY

## 2020-01-13 PROCEDURE — 72110 X-RAY EXAM L-2 SPINE 4/>VWS: CPT | Mod: TC,PN

## 2020-01-13 PROCEDURE — 72110 X-RAY EXAM L-2 SPINE 4/>VWS: CPT | Mod: 26,,, | Performed by: RADIOLOGY

## 2020-01-13 NOTE — TELEPHONE ENCOUNTER
Patient called to report pain in lower back/right hip after bending over and experiencing immediate pain. Offered same day appt, patient declined due to work but requested x-ray be ordered, please advise.

## 2020-01-13 NOTE — TELEPHONE ENCOUNTER
----- Message from Jo Pacheco sent at 1/13/2020 10:36 AM CST -----  Contact: patient  Type:  Patient Returning Call    Who Called:  patient  Who Left Message for Patient:  Kathi   Does the patient know what this is regarding?:    Best Call Back Number:  [phone]  Additional Information:  Would like to have xray done first

## 2020-03-17 RX ORDER — ALBUTEROL SULFATE 90 UG/1
AEROSOL, METERED RESPIRATORY (INHALATION)
Qty: 18 G | Refills: 2 | Status: SHIPPED | OUTPATIENT
Start: 2020-03-17 | End: 2021-04-01 | Stop reason: ALTCHOICE

## 2020-04-02 RX ORDER — ESOMEPRAZOLE MAGNESIUM 40 MG/1
CAPSULE, DELAYED RELEASE ORAL
Qty: 90 CAPSULE | Refills: 0 | Status: SHIPPED | OUTPATIENT
Start: 2020-04-02 | End: 2020-04-06

## 2020-04-06 RX ORDER — ESOMEPRAZOLE MAGNESIUM 40 MG/1
CAPSULE, DELAYED RELEASE ORAL
Qty: 90 CAPSULE | Refills: 3 | Status: SHIPPED | OUTPATIENT
Start: 2020-04-06 | End: 2020-06-30

## 2020-04-14 ENCOUNTER — TELEPHONE (OUTPATIENT)
Dept: FAMILY MEDICINE | Facility: CLINIC | Age: 55
End: 2020-04-14

## 2020-04-14 NOTE — TELEPHONE ENCOUNTER
Pa request received from [email or web address]   For Esomepraxole Magnesium 40mg  Pt Id # [id number]     PA initiated via [email or web address]  Key [id number]    Awaiting response.

## 2020-04-14 NOTE — TELEPHONE ENCOUNTER
PA for Nexium denied for Nexium 40mg.    Reference number [id number]    Requested med or diagnosis are not a covered benefit   and are excluded from coverage.     The treating provider can call [phone] to discuss the coverage decision.     Please advise if you would like to send in something else to the pharmacy or do the appeal.

## 2020-04-14 NOTE — TELEPHONE ENCOUNTER
Advise patient that nexium is not covered. Give her the choice of buying it over the counter or having me send in Omeprazole.

## 2020-10-05 RX ORDER — ESOMEPRAZOLE MAGNESIUM 40 MG/1
CAPSULE, DELAYED RELEASE ORAL
Qty: 90 CAPSULE | Refills: 0 | Status: SHIPPED | OUTPATIENT
Start: 2020-10-05 | End: 2020-10-06 | Stop reason: CLARIF

## 2020-10-06 ENCOUNTER — TELEPHONE (OUTPATIENT)
Dept: FAMILY MEDICINE | Facility: CLINIC | Age: 55
End: 2020-10-06

## 2020-10-06 RX ORDER — ESOMEPRAZOLE MAGNESIUM 40 MG/1
40 CAPSULE, DELAYED RELEASE ORAL NIGHTLY
Qty: 90 CAPSULE | Refills: 2 | Status: SHIPPED | OUTPATIENT
Start: 2020-10-06 | End: 2021-06-03 | Stop reason: ALTCHOICE

## 2020-10-06 RX ORDER — PANTOPRAZOLE SODIUM 40 MG/1
40 TABLET, DELAYED RELEASE ORAL DAILY
Qty: 90 TABLET | Refills: 2 | Status: SHIPPED | OUTPATIENT
Start: 2020-10-06 | End: 2020-10-06 | Stop reason: CLARIF

## 2020-10-06 NOTE — TELEPHONE ENCOUNTER
Called pt to inform her the reason of the medication change.  She states [facility] ran the esomeprazole through the wrong ins that is why it was not covered.  When it is run through [employer], the medication is covered.  Per pt, please discontinue the protonix and re-prescribe the esomeprazole.

## 2020-10-06 NOTE — TELEPHONE ENCOUNTER
----- Message from Elsie Ornelas sent at 10/6/2020  9:11 AM CDT -----  Regarding: Pt advice  Contact: Pt  Type:  Patient Returning Call    Who Called:  pt  Does the patient know what this is regarding?:  please follow up with pt as soon as possible regarding sinu and congestion would like to have something called in before scheduling an appt  Best Call Back Number:    Additional Information:  Thank you

## 2020-10-06 NOTE — TELEPHONE ENCOUNTER
Received fax from [employer]Cover my meds that esomeprazole is not covered by pts ins.  Attempt PA or change med?    Key code- [id number]

## 2020-10-07 ENCOUNTER — OFFICE VISIT (OUTPATIENT)
Dept: FAMILY MEDICINE | Facility: CLINIC | Age: 55
End: 2020-10-07
Payer: COMMERCIAL

## 2020-10-07 DIAGNOSIS — J30.9 ALLERGIC RHINITIS, UNSPECIFIED SEASONALITY, UNSPECIFIED TRIGGER: ICD-10-CM

## 2020-10-07 DIAGNOSIS — Z91.89 AT INCREASED RISK OF EXPOSURE TO COVID-19 VIRUS: ICD-10-CM

## 2020-10-07 DIAGNOSIS — J01.00 ACUTE MAXILLARY SINUSITIS, RECURRENCE NOT SPECIFIED: Primary | ICD-10-CM

## 2020-10-07 PROCEDURE — 99213 PR OFFICE/OUTPT VISIT, EST, LEVL III, 20-29 MIN: ICD-10-PCS | Mod: 95,,, | Performed by: NURSE PRACTITIONER

## 2020-10-07 PROCEDURE — 99213 OFFICE O/P EST LOW 20 MIN: CPT | Mod: 95,,, | Performed by: NURSE PRACTITIONER

## 2020-10-07 RX ORDER — AZITHROMYCIN 250 MG/1
TABLET, FILM COATED ORAL
Qty: 6 TABLET | Refills: 0 | Status: SHIPPED | OUTPATIENT
Start: 2020-10-07 | End: 2020-10-12

## 2020-10-07 NOTE — PROGRESS NOTES
Answers for HPI/ROS submitted by the patient on 10/7/2020   activity change: No  unexpected weight change: No  neck pain: No  hearing loss: No  rhinorrhea: No  trouble swallowing: No  eye discharge: No  visual disturbance: No  chest tightness: No  wheezing: No  chest pain: No  palpitations: No  blood in stool: No  constipation: No  vomiting: No  diarrhea: No  polydipsia: No  polyuria: No  difficulty urinating: No  hematuria: No  menstrual problem: No  dysuria: No  joint swelling: No  arthralgias: No  headaches: No  weakness: No  confusion: No  dysphoric mood: No    The patient location is: [place]   The chief complaint leading to consultation is: URI symptoms    Visit type: audiovisual    Face to Face time with patient: 10 minutes  15 minutes of total time spent on the encounter, which includes face to face time and non-face to face time preparing to see the patient (eg, review of tests), Obtaining and/or reviewing separately obtained history, Documenting clinical information in the electronic or other health record, Independently interpreting results (not separately reported) and communicating results to the patient/family/caregiver, or Care coordination (not separately reported).     Each patient to whom he or she provides medical services by telemedicine is:  (1) informed of the relationship between the physician and patient and the respective role of any other health care provider with respect to management of the patient; and (2) notified that he or she may decline to receive medical services by telemedicine and may withdraw from such care at any time.    Subjective:       Patient ID: Lavern Hodges is a 55 y.o. female.    Chief Complaint: URI symptoms.    HPI onset over the past several weeks, more allergy type symptoms. Taking allergy sinus medication. Now this past week has gotten worse. Low grade fever. Headache. No loss of taste or smell. Increased fatigue. Sinus area pressure. PND makes her cough. No  wheezing. See ROS.    The following portion of the patients history was reviewed and updated as appropriate: allergies, current medications, past medical and surgical history. Past social history and problem list reviewed. Family PMH and Past social history reviewed. Tobacco, Illicit drug use reviewed.      Review of patient's allergies indicates:   Allergen Reactions    Cefaclor Hives    Codeine Hives         Current Outpatient Medications:     azithromycin (Z-ANJEL) 250 MG tablet, Take 2 tablets by mouth on day 1; Take 1 tablet by mouth on days 2-5, Disp: 6 tablet, Rfl: 0    dicyclomine (BENTYL) 20 mg tablet, TAKE 1 TABLET(20 MG) BY MOUTH FOUR TIMES DAILY prn, Disp: 120 tablet, Rfl: 2    esomeprazole (NEXIUM) 40 MG capsule, Take 1 capsule (40 mg total) by mouth every evening., Disp: 90 capsule, Rfl: 2    progesterone (PROMETRIUM) 200 MG capsule, TK 1 C PO QHS ON DAYS 1 THROUGH 12 OF THE MONTH ON CYCLE, Disp: , Rfl: 4    VENTOLIN HFA 90 mcg/actuation inhaler, INHALE 2 PUFFS INTO THE LUNGS EVERY 6 HOURS AS NEEDED FOR WHEEZING. RESCUE, Disp: 18 g, Rfl: 2    Past Medical History:   Diagnosis Date    Allergy     Anxiety     Asthma     due to allergies     GERD (gastroesophageal reflux disease)     History of nephrolithiasis     Irritable bowel syndrome     Kidney stone     lithotripsy 20 years ago    Peptic ulcer disease     PONV (postoperative nausea and vomiting)     Colin syndrome     Vitamin D deficiency        Past Surgical History:   Procedure Laterality Date     SECTION      times 2    COLONOSCOPY  2016    repeat in 10 years. clear.  internal/external hemorrhoids only.    COLONOSCOPY N/A 2016    Procedure: COLONOSCOPY;  Surgeon: Sherman Arevalo MD;  Location: [facility];  Service: Endoscopy;  Laterality: N/A;    HYSTEROSCOPY      TUBAL LIGATION      UPPER GASTROINTESTINAL ENDOSCOPY  10/29/2010    Dr. Arevalo: michael hernandez ring- dilated, gastric polyp removed,  otherwise normal findings. repeat PRN    WISDOM TOOTH EXTRACTION         Social History     Socioeconomic History    Marital status:      Spouse name: Not on file    Number of children: Not on file    Years of education: Not on file    Highest education level: Not on file   Occupational History    Not on file   Social Needs    Financial resource strain: Not on file    Food insecurity     Worry: Not on file     Inability: Not on file    Transportation needs     Medical: Not on file     Non-medical: Not on file   Tobacco Use    Smoking status: Former Smoker     Types: Cigarettes     Quit date: 8/3/1994     Years since quittin.1    Smokeless tobacco: Never Used   Substance and Sexual Activity    Alcohol use: No    Drug use: No    Sexual activity: Yes     Partners: Male     Birth control/protection: None, See Surgical Hx   Lifestyle    Physical activity     Days per week: Not on file     Minutes per session: Not on file    Stress: Not on file   Relationships    Social connections     Talks on phone: Not on file     Gets together: Not on file     Attends [employer] service: Not on file     Active member of club or organization: Not on file     Attends meetings of clubs or organizations: Not on file     Relationship status: Not on file   Other Topics Concern    Not on file   Social History Narrative    Not on file     Review of Systems   Constitutional: Negative for activity change, fatigue and fever.   HENT: Positive for congestion, postnasal drip, sinus pressure and sinus pain. Negative for rhinorrhea, sore throat and trouble swallowing.         Upper teeth sensitive   Eyes: Negative for discharge and visual disturbance.   Respiratory: Negative for cough, chest tightness, shortness of breath and wheezing.    Cardiovascular: Negative for chest pain and palpitations.   Gastrointestinal: Negative for abdominal pain, blood in stool, constipation, diarrhea, nausea and vomiting.    Musculoskeletal: Negative for arthralgias.   Neurological: Positive for headaches (sinus pressure headache). Negative for weakness.   Psychiatric/Behavioral: Negative for confusion and dysphoric mood.       Objective:       Physical Exam  Constitutional:       General: She is not in acute distress.     Appearance: She is well-developed.   HENT:      Head: Normocephalic and atraumatic.      Nose:      Comments: Maxillary sinus area tenderness over the right sinus area per patient self exam  Eyes:      Conjunctiva/sclera: Conjunctivae normal.   Neck:      Musculoskeletal: Normal range of motion.      Thyroid: No thyromegaly.      Vascular: No JVD.   Pulmonary:      Effort: Pulmonary effort is normal. No accessory muscle usage.      Breath sounds: No wheezing (no audible wheezing).   Musculoskeletal:      Comments: Coordination appears normal per video   Skin:     General: Skin is dry.      Findings: No rash.      Comments: No bruising or rash noted. Patient is not pale   Neurological:      Mental Status: She is alert and oriented to person, place, and time.   Psychiatric:         Speech: Speech normal.         Behavior: Behavior normal.         Thought Content: Thought content normal.         Assessment:       1. Acute maxillary sinusitis, recurrence not specified    2. At increased risk of exposure to COVID-19 virus    3. Allergic rhinitis, unspecified seasonality, unspecified trigger        Plan:       Acute maxillary sinusitis, recurrence not specified: Due to duration and presenting exam will cover with antibiotics. Take as directed. Complete full course of medication.    At increased risk of exposure to COVID-19 virus  -     COVID-19 Routine Screening; Future; Expected date: 10/07/2020    Allergic rhinitis, unspecified seasonality, unspecified trigger: continue claritin daily. Use flonase nasal spray daily    Other orders  -     azithromycin (Z-ANJEL) 250 MG tablet; Take 2 tablets by mouth on day 1; Take 1 tablet  by mouth on days 2-5  Dispense: 6 tablet; Refill: 0       Continue current medication  Take medications only as prescribed  Healthy diet, exercise  Adequate rest  Adequate hydration  Avoid allergens  Avoid excessive caffeine   Follow up one week if not improving

## 2020-10-15 ENCOUNTER — IMMUNIZATION (OUTPATIENT)
Dept: FAMILY MEDICINE | Facility: CLINIC | Age: 55
End: 2020-10-15
Payer: COMMERCIAL

## 2020-10-15 PROCEDURE — 90471 IMMUNIZATION ADMIN: CPT | Mod: S$GLB,,, | Performed by: NURSE PRACTITIONER

## 2020-10-15 PROCEDURE — 90686 FLU VACCINE (QUAD) GREATER THAN OR EQUAL TO 3YO PRESERVATIVE FREE IM: ICD-10-PCS | Mod: S$GLB,,, | Performed by: NURSE PRACTITIONER

## 2020-10-15 PROCEDURE — 90471 FLU VACCINE (QUAD) GREATER THAN OR EQUAL TO 3YO PRESERVATIVE FREE IM: ICD-10-PCS | Mod: S$GLB,,, | Performed by: NURSE PRACTITIONER

## 2020-10-15 PROCEDURE — 90686 IIV4 VACC NO PRSV 0.5 ML IM: CPT | Mod: S$GLB,,, | Performed by: NURSE PRACTITIONER

## 2020-11-10 ENCOUNTER — TELEPHONE (OUTPATIENT)
Dept: FAMILY MEDICINE | Facility: CLINIC | Age: 55
End: 2020-11-10

## 2020-11-10 ENCOUNTER — OFFICE VISIT (OUTPATIENT)
Dept: FAMILY MEDICINE | Facility: CLINIC | Age: 55
End: 2020-11-10
Payer: COMMERCIAL

## 2020-11-10 DIAGNOSIS — J01.01 ACUTE RECURRENT MAXILLARY SINUSITIS: Primary | ICD-10-CM

## 2020-11-10 DIAGNOSIS — R51.9 SINUS HEADACHE: ICD-10-CM

## 2020-11-10 DIAGNOSIS — F41.9 ANXIETY: ICD-10-CM

## 2020-11-10 PROCEDURE — 99213 OFFICE O/P EST LOW 20 MIN: CPT | Mod: 95,,, | Performed by: NURSE PRACTITIONER

## 2020-11-10 PROCEDURE — 99213 PR OFFICE/OUTPT VISIT, EST, LEVL III, 20-29 MIN: ICD-10-PCS | Mod: 95,,, | Performed by: NURSE PRACTITIONER

## 2020-11-10 RX ORDER — CLONAZEPAM 0.5 MG/1
0.5 TABLET ORAL NIGHTLY PRN
Qty: 20 TABLET | Refills: 0 | Status: SHIPPED | OUTPATIENT
Start: 2020-11-10 | End: 2022-11-25 | Stop reason: ALTCHOICE

## 2020-11-10 RX ORDER — AZITHROMYCIN 250 MG/1
TABLET, FILM COATED ORAL
Qty: 6 TABLET | Refills: 0 | Status: SHIPPED | OUTPATIENT
Start: 2020-11-10 | End: 2020-11-15

## 2020-11-10 NOTE — TELEPHONE ENCOUNTER
Offered pt VV, but she declined.  Just wants you to call abx in for her sinus congestion since Sunday.  No fever. No cough.

## 2020-11-10 NOTE — TELEPHONE ENCOUNTER
We are not allowed to do this anymore. She will need virtual visit in order for me to send it in. We can do facetime if she has issues with computer access.

## 2020-11-10 NOTE — PROGRESS NOTES
The patient location is: [place]  The chief complaint leading to consultation is: Sinusitis    Visit type: audiovisual Facetime    Face to Face time with patient: 10 minutes  12 minutes of total time spent on the encounter, which includes face to face time and non-face to face time preparing to see the patient (eg, review of tests), Obtaining and/or reviewing separately obtained history, Documenting clinical information in the electronic or other health record, Independently interpreting results (not separately reported) and communicating results to the patient/family/caregiver, or Care coordination (not separately reported).     Each patient to whom he or she provides medical services by telemedicine is:  (1) informed of the relationship between the physician and patient and the respective role of any other health care provider with respect to management of the patient; and (2) notified that he or she may decline to receive medical services by telemedicine and may withdraw from such care at any time.      Subjective:       Patient ID: Lavern Hodges is a 55 y.o. female.    Chief Complaint: sinus congestion    HPI onset over the past 10 days with symptoms of sinus congestion, pain, PND, sinus headache. Not improving with OTC allergy sinus medication. Facial tenderness from the pressure. No fever. No exposure to covid. States she got better with the zirthromax about a month ago but the symptoms slowly came back. Recommended different antibiotic but she wants to try the zithromax again due to sensitive stomach. See ROS.    The following portion of the patients history was reviewed and updated as appropriate: allergies, current medications, past medical and surgical history. Past social history and problem list reviewed. Family PMH and Past social history reviewed. Tobacco, Illicit drug use reviewed.      Review of patient's allergies indicates:   Allergen Reactions    Cefaclor Hives    Codeine Hives         Current  Outpatient Medications:     dicyclomine (BENTYL) 20 mg tablet, TAKE 1 TABLET(20 MG) BY MOUTH FOUR TIMES DAILY prn, Disp: 120 tablet, Rfl: 2    esomeprazole (NEXIUM) 40 MG capsule, Take 1 capsule (40 mg total) by mouth every evening., Disp: 90 capsule, Rfl: 2    progesterone (PROMETRIUM) 200 MG capsule, TK 1 C PO QHS ON DAYS 1 THROUGH 12 OF THE MONTH ON CYCLE, Disp: , Rfl: 4    VENTOLIN HFA 90 mcg/actuation inhaler, INHALE 2 PUFFS INTO THE LUNGS EVERY 6 HOURS AS NEEDED FOR WHEEZING. RESCUE, Disp: 18 g, Rfl: 2    Past Medical History:   Diagnosis Date    Allergy     Anxiety     Asthma     due to allergies     GERD (gastroesophageal reflux disease)     History of nephrolithiasis     Irritable bowel syndrome     Kidney stone     lithotripsy 20 years ago    Peptic ulcer disease     PONV (postoperative nausea and vomiting)     Colin syndrome     Vitamin D deficiency        Past Surgical History:   Procedure Laterality Date     SECTION      times 2    COLONOSCOPY  2016    repeat in 10 years. clear.  internal/external hemorrhoids only.    COLONOSCOPY N/A 2016    Procedure: COLONOSCOPY;  Surgeon: Sherman Arevalo MD;  Location: [facility];  Service: Endoscopy;  Laterality: N/A;    HYSTEROSCOPY      TUBAL LIGATION      UPPER GASTROINTESTINAL ENDOSCOPY  10/29/2010    Dr. Arevalo: mil schatzki ring- dilated, gastric polyp removed, otherwise normal findings. repeat PRN    WISDOM TOOTH EXTRACTION         Social History     Socioeconomic History    Marital status:      Spouse name: Not on file    Number of children: Not on file    Years of education: Not on file    Highest education level: Not on file   Occupational History    Not on file   Social Needs    Financial resource strain: Not on file    Food insecurity     Worry: Not on file     Inability: Not on file    Transportation needs     Medical: Not on file     Non-medical: Not on file   Tobacco Use    Smoking  status: Former Smoker     Types: Cigarettes     Quit date: 8/3/1994     Years since quittin.2    Smokeless tobacco: Never Used   Substance and Sexual Activity    Alcohol use: No    Drug use: No    Sexual activity: Yes     Partners: Male     Birth control/protection: None, See Surgical Hx   Lifestyle    Physical activity     Days per week: Not on file     Minutes per session: Not on file    Stress: Not on file   Relationships    Social connections     Talks on phone: Not on file     Gets together: Not on file     Attends Mandaen service: Not on file     Active member of club or organization: Not on file     Attends meetings of clubs or organizations: Not on file     Relationship status: Not on file   Other Topics Concern    Not on file   Social History Narrative    Not on file     Review of Systems   Constitutional: Positive for fatigue. Negative for fever.   HENT: Positive for congestion, postnasal drip, rhinorrhea, sinus pressure and sinus pain. Negative for sore throat and voice change.    Eyes: Negative for visual disturbance.   Respiratory: Positive for cough. Negative for chest tightness, shortness of breath and wheezing.    Cardiovascular: Negative for chest pain and palpitations.   Gastrointestinal: Negative for abdominal pain, diarrhea, nausea and vomiting.   Musculoskeletal: Negative for arthralgias, back pain and gait problem.   Neurological: Positive for headaches (sinus pressure headache). Negative for dizziness and weakness.   Psychiatric/Behavioral: Negative for decreased concentration, dysphoric mood and sleep disturbance. The patient is nervous/anxious (family member diagnosed with cancer. more stress. wants refill of the klonopin. last refilled 13 months ago. ).        Objective:       Physical Exam  Constitutional:       General: She is not in acute distress.     Appearance: She is well-developed.   HENT:      Head: Normocephalic and atraumatic.   Eyes:      Conjunctiva/sclera:  Conjunctivae normal.   Neck:      Musculoskeletal: Normal range of motion.   Pulmonary:      Effort: Pulmonary effort is normal. No accessory muscle usage.      Breath sounds: No wheezing (no audible wheezing).   Musculoskeletal:      Comments: Coordination appears normal per video   Skin:     General: Skin is dry.      Findings: No rash.      Comments: No bruising or rash noted. Patient is not pale   Neurological:      Mental Status: She is alert and oriented to person, place, and time.   Psychiatric:         Speech: Speech normal.         Behavior: Behavior normal.         Thought Content: Thought content normal.         Assessment:       1. Acute recurrent maxillary sinusitis    2. Sinus headache    3. Anxiety        Plan:       Acute recurrent maxillary sinusitis: Due to duration and presenting exam will cover with antibiotics. Take as directed. Complete full course of medication.    Sinus headache: tylenol prn.    Anxiety: refill klonopin. Last given 13 months ago. She is aware of the restrictions on this medication. Only take when needed. Do not take and drive if sedation occurs. Do not take with ETOH or other sedatives.     Other orders  -     clonazePAM (KLONOPIN) 0.5 MG tablet; Take 1 tablet (0.5 mg total) by mouth nightly as needed for Anxiety.  Dispense: 20 tablet; Refill: 0  -     azithromycin (Z-ANJEL) 250 MG tablet; Take 2 tablets by mouth on day 1; Take 1 tablet by mouth on days 2-5  Dispense: 6 tablet; Refill: 0       Continue current medication  Take medications only as prescribed  Healthy diet  Adequate rest  Adequate hydration  Avoid allergens  Avoid excessive caffeine     follow up if not improving.

## 2020-11-10 NOTE — TELEPHONE ENCOUNTER
----- Message from Fran Guo sent at 11/10/2020  9:10 AM CST -----  Regarding: RX  Contact: patient  Patient want to know if office can call her something in for a sinus infection, if so please send to [facility], any questions please call back at [phone] (home)       [id number] [id number] [place] [address] AT [facility] [address]  [address] [place] [id number]  Phone: [phone] Fax: [phone]

## 2021-03-10 ENCOUNTER — IMMUNIZATION (OUTPATIENT)
Dept: FAMILY MEDICINE | Facility: CLINIC | Age: 56
End: 2021-03-10
Payer: COMMERCIAL

## 2021-03-10 DIAGNOSIS — Z23 NEED FOR VACCINATION: Primary | ICD-10-CM

## 2021-03-10 PROCEDURE — 91300 COVID-19, MRNA, LNP-S, PF, 30 MCG/0.3 ML DOSE VACCINE: CPT | Mod: PBBFAC | Performed by: FAMILY MEDICINE

## 2021-03-19 ENCOUNTER — OFFICE VISIT (OUTPATIENT)
Dept: URGENT CARE | Facility: CLINIC | Age: 56
End: 2021-03-19
Payer: COMMERCIAL

## 2021-03-19 VITALS
BODY MASS INDEX: 29.37 KG/M2 | OXYGEN SATURATION: 99 % | SYSTOLIC BLOOD PRESSURE: 161 MMHG | HEIGHT: 64 IN | DIASTOLIC BLOOD PRESSURE: 87 MMHG | WEIGHT: 172 LBS | HEART RATE: 96 BPM | TEMPERATURE: 98 F

## 2021-03-19 DIAGNOSIS — J02.9 ACUTE PHARYNGITIS, UNSPECIFIED ETIOLOGY: Primary | ICD-10-CM

## 2021-03-19 DIAGNOSIS — R50.9 FEVER, UNSPECIFIED FEVER CAUSE: ICD-10-CM

## 2021-03-19 LAB
CTP QC/QA: YES
CTP QC/QA: YES
MOLECULAR STREP A: NEGATIVE
SARS-COV-2 RDRP RESP QL NAA+PROBE: NEGATIVE

## 2021-03-19 PROCEDURE — 99214 PR OFFICE/OUTPT VISIT, EST, LEVL IV, 30-39 MIN: ICD-10-PCS | Mod: S$GLB,,, | Performed by: PHYSICIAN ASSISTANT

## 2021-03-19 PROCEDURE — U0002 COVID-19 LAB TEST NON-CDC: HCPCS | Mod: QW,S$GLB,, | Performed by: PHYSICIAN ASSISTANT

## 2021-03-19 PROCEDURE — 87651 STREP A DNA AMP PROBE: CPT | Mod: QW,S$GLB,, | Performed by: PHYSICIAN ASSISTANT

## 2021-03-19 PROCEDURE — 3008F BODY MASS INDEX DOCD: CPT | Mod: CPTII,S$GLB,, | Performed by: PHYSICIAN ASSISTANT

## 2021-03-19 PROCEDURE — 87651 POCT STREP A MOLECULAR: ICD-10-PCS | Mod: QW,S$GLB,, | Performed by: PHYSICIAN ASSISTANT

## 2021-03-19 PROCEDURE — 3008F PR BODY MASS INDEX (BMI) DOCUMENTED: ICD-10-PCS | Mod: CPTII,S$GLB,, | Performed by: PHYSICIAN ASSISTANT

## 2021-03-19 PROCEDURE — U0002: ICD-10-PCS | Mod: QW,S$GLB,, | Performed by: PHYSICIAN ASSISTANT

## 2021-03-19 PROCEDURE — 99214 OFFICE O/P EST MOD 30 MIN: CPT | Mod: S$GLB,,, | Performed by: PHYSICIAN ASSISTANT

## 2021-03-19 RX ORDER — AZITHROMYCIN 250 MG/1
TABLET, FILM COATED ORAL
Qty: 6 TABLET | Refills: 0 | Status: SHIPPED | OUTPATIENT
Start: 2021-03-19 | End: 2021-04-01 | Stop reason: ALTCHOICE

## 2021-03-23 ENCOUNTER — TELEPHONE (OUTPATIENT)
Dept: FAMILY MEDICINE | Facility: CLINIC | Age: 56
End: 2021-03-23

## 2021-03-24 ENCOUNTER — OFFICE VISIT (OUTPATIENT)
Dept: FAMILY MEDICINE | Facility: CLINIC | Age: 56
End: 2021-03-24
Payer: COMMERCIAL

## 2021-03-24 VITALS
HEIGHT: 64 IN | DIASTOLIC BLOOD PRESSURE: 80 MMHG | HEART RATE: 94 BPM | SYSTOLIC BLOOD PRESSURE: 128 MMHG | TEMPERATURE: 98 F | OXYGEN SATURATION: 98 % | BODY MASS INDEX: 30.28 KG/M2 | RESPIRATION RATE: 12 BRPM | WEIGHT: 177.38 LBS

## 2021-03-24 DIAGNOSIS — J02.9 SORE THROAT: Primary | ICD-10-CM

## 2021-03-24 DIAGNOSIS — J30.9 ALLERGIC RHINITIS, UNSPECIFIED SEASONALITY, UNSPECIFIED TRIGGER: ICD-10-CM

## 2021-03-24 PROCEDURE — 1125F AMNT PAIN NOTED PAIN PRSNT: CPT | Mod: S$GLB,,, | Performed by: NURSE PRACTITIONER

## 2021-03-24 PROCEDURE — 3008F BODY MASS INDEX DOCD: CPT | Mod: CPTII,S$GLB,, | Performed by: NURSE PRACTITIONER

## 2021-03-24 PROCEDURE — 99214 OFFICE O/P EST MOD 30 MIN: CPT | Mod: S$GLB,,, | Performed by: NURSE PRACTITIONER

## 2021-03-24 PROCEDURE — 3008F PR BODY MASS INDEX (BMI) DOCUMENTED: ICD-10-PCS | Mod: CPTII,S$GLB,, | Performed by: NURSE PRACTITIONER

## 2021-03-24 PROCEDURE — 1125F PR PAIN SEVERITY QUANTIFIED, PAIN PRESENT: ICD-10-PCS | Mod: S$GLB,,, | Performed by: NURSE PRACTITIONER

## 2021-03-24 PROCEDURE — 99214 PR OFFICE/OUTPT VISIT, EST, LEVL IV, 30-39 MIN: ICD-10-PCS | Mod: S$GLB,,, | Performed by: NURSE PRACTITIONER

## 2021-03-24 PROCEDURE — 87081 CULTURE SCREEN ONLY: CPT | Performed by: NURSE PRACTITIONER

## 2021-03-27 LAB — BACTERIA THROAT CULT: NORMAL

## 2021-03-31 ENCOUNTER — IMMUNIZATION (OUTPATIENT)
Dept: FAMILY MEDICINE | Facility: CLINIC | Age: 56
End: 2021-03-31
Payer: COMMERCIAL

## 2021-03-31 DIAGNOSIS — Z23 NEED FOR VACCINATION: Primary | ICD-10-CM

## 2021-03-31 PROCEDURE — 91300 COVID-19, MRNA, LNP-S, PF, 30 MCG/0.3 ML DOSE VACCINE: CPT | Mod: ,,, | Performed by: INTERNAL MEDICINE

## 2021-03-31 PROCEDURE — 91300 COVID-19, MRNA, LNP-S, PF, 30 MCG/0.3 ML DOSE VACCINE: ICD-10-PCS | Mod: ,,, | Performed by: INTERNAL MEDICINE

## 2021-03-31 PROCEDURE — 0002A COVID-19, MRNA, LNP-S, PF, 30 MCG/0.3 ML DOSE VACCINE: CPT | Mod: CV19,,, | Performed by: INTERNAL MEDICINE

## 2021-03-31 PROCEDURE — 0002A COVID-19, MRNA, LNP-S, PF, 30 MCG/0.3 ML DOSE VACCINE: ICD-10-PCS | Mod: CV19,,, | Performed by: INTERNAL MEDICINE

## 2021-05-05 ENCOUNTER — HOSPITAL ENCOUNTER (OUTPATIENT)
Dept: RADIOLOGY | Facility: HOSPITAL | Age: 56
Discharge: HOME | End: 2021-05-05
Attending: NURSE PRACTITIONER
Payer: COMMERCIAL

## 2021-05-05 ENCOUNTER — TELEPHONE (OUTPATIENT)
Dept: FAMILY MEDICINE | Facility: CLINIC | Age: 56
End: 2021-05-05

## 2021-05-05 ENCOUNTER — PATIENT MESSAGE (OUTPATIENT)
Dept: FAMILY MEDICINE | Facility: CLINIC | Age: 56
End: 2021-05-05

## 2021-05-05 ENCOUNTER — OFFICE VISIT (OUTPATIENT)
Dept: FAMILY MEDICINE | Facility: CLINIC | Age: 56
End: 2021-05-05
Payer: COMMERCIAL

## 2021-05-05 VITALS
DIASTOLIC BLOOD PRESSURE: 78 MMHG | RESPIRATION RATE: 18 BRPM | SYSTOLIC BLOOD PRESSURE: 120 MMHG | WEIGHT: 177 LBS | HEIGHT: 64 IN | OXYGEN SATURATION: 97 % | HEART RATE: 96 BPM | BODY MASS INDEX: 30.22 KG/M2 | TEMPERATURE: 98 F

## 2021-05-05 DIAGNOSIS — R09.89 CHEST CONGESTION: Primary | ICD-10-CM

## 2021-05-05 DIAGNOSIS — J18.9 PNEUMONIA OF LEFT LOWER LOBE DUE TO INFECTIOUS ORGANISM: ICD-10-CM

## 2021-05-05 DIAGNOSIS — R09.89 CHEST CONGESTION: ICD-10-CM

## 2021-05-05 DIAGNOSIS — R05.9 COUGH: ICD-10-CM

## 2021-05-05 PROCEDURE — 71046 X-RAY EXAM CHEST 2 VIEWS: CPT | Mod: 26,,, | Performed by: RADIOLOGY

## 2021-05-05 PROCEDURE — 99214 OFFICE O/P EST MOD 30 MIN: CPT | Mod: S$GLB,,, | Performed by: NURSE PRACTITIONER

## 2021-05-05 PROCEDURE — 71046 X-RAY EXAM CHEST 2 VIEWS: CPT | Mod: TC,FY,PO

## 2021-05-05 PROCEDURE — 99214 PR OFFICE/OUTPT VISIT, EST, LEVL IV, 30-39 MIN: ICD-10-PCS | Mod: S$GLB,,, | Performed by: NURSE PRACTITIONER

## 2021-05-05 PROCEDURE — 71046 XR CHEST PA AND LATERAL: ICD-10-PCS | Mod: 26,,, | Performed by: RADIOLOGY

## 2021-05-05 PROCEDURE — 1125F AMNT PAIN NOTED PAIN PRSNT: CPT | Mod: S$GLB,,, | Performed by: NURSE PRACTITIONER

## 2021-05-05 PROCEDURE — 1125F PR PAIN SEVERITY QUANTIFIED, PAIN PRESENT: ICD-10-PCS | Mod: S$GLB,,, | Performed by: NURSE PRACTITIONER

## 2021-05-05 PROCEDURE — 3008F BODY MASS INDEX DOCD: CPT | Mod: CPTII,S$GLB,, | Performed by: NURSE PRACTITIONER

## 2021-05-05 PROCEDURE — 3008F PR BODY MASS INDEX (BMI) DOCUMENTED: ICD-10-PCS | Mod: CPTII,S$GLB,, | Performed by: NURSE PRACTITIONER

## 2021-05-05 RX ORDER — AMOXICILLIN AND CLAVULANATE POTASSIUM 875; 125 MG/1; MG/1
1 TABLET, FILM COATED ORAL 2 TIMES DAILY
Qty: 20 TABLET | Refills: 0 | Status: SHIPPED | OUTPATIENT
Start: 2021-05-05 | End: 2021-05-15

## 2021-05-12 ENCOUNTER — TELEPHONE (OUTPATIENT)
Dept: FAMILY MEDICINE | Facility: CLINIC | Age: 56
End: 2021-05-12

## 2021-05-12 ENCOUNTER — PATIENT MESSAGE (OUTPATIENT)
Dept: FAMILY MEDICINE | Facility: CLINIC | Age: 56
End: 2021-05-12

## 2021-05-12 RX ORDER — PREDNISONE 20 MG/1
TABLET ORAL
Qty: 10 TABLET | Refills: 0 | Status: SHIPPED | OUTPATIENT
Start: 2021-05-12 | End: 2021-05-25 | Stop reason: ALTCHOICE

## 2021-05-14 ENCOUNTER — TELEPHONE (OUTPATIENT)
Dept: FAMILY MEDICINE | Facility: CLINIC | Age: 56
End: 2021-05-14

## 2021-05-14 DIAGNOSIS — J18.9 PNEUMONIA DUE TO INFECTIOUS ORGANISM, UNSPECIFIED LATERALITY, UNSPECIFIED PART OF LUNG: Primary | ICD-10-CM

## 2021-05-21 ENCOUNTER — TELEPHONE (OUTPATIENT)
Dept: FAMILY MEDICINE | Facility: CLINIC | Age: 56
End: 2021-05-21

## 2021-05-21 RX ORDER — AZITHROMYCIN 250 MG/1
TABLET, FILM COATED ORAL
Qty: 6 TABLET | Refills: 0 | Status: SHIPPED | OUTPATIENT
Start: 2021-05-21 | End: 2021-05-26

## 2021-05-25 ENCOUNTER — OFFICE VISIT (OUTPATIENT)
Dept: FAMILY MEDICINE | Facility: CLINIC | Age: 56
End: 2021-05-25
Payer: COMMERCIAL

## 2021-05-25 VITALS
SYSTOLIC BLOOD PRESSURE: 118 MMHG | OXYGEN SATURATION: 97 % | BODY MASS INDEX: 29.96 KG/M2 | WEIGHT: 175.5 LBS | DIASTOLIC BLOOD PRESSURE: 60 MMHG | HEART RATE: 85 BPM | RESPIRATION RATE: 18 BRPM | HEIGHT: 64 IN | TEMPERATURE: 99 F

## 2021-05-25 DIAGNOSIS — R39.9 UTI SYMPTOMS: Primary | ICD-10-CM

## 2021-05-25 DIAGNOSIS — R31.9 URINARY TRACT INFECTION WITH HEMATURIA, SITE UNSPECIFIED: ICD-10-CM

## 2021-05-25 DIAGNOSIS — R30.0 DYSURIA: ICD-10-CM

## 2021-05-25 DIAGNOSIS — N39.0 URINARY TRACT INFECTION WITH HEMATURIA, SITE UNSPECIFIED: ICD-10-CM

## 2021-05-25 LAB
BILIRUB SERPL-MCNC: NEGATIVE MG/DL
BLOOD URINE, POC: ABNORMAL
CLARITY, POC UA: CLEAR
COLOR, POC UA: YELLOW
GLUCOSE UR QL STRIP: NORMAL
KETONES UR QL STRIP: NEGATIVE
LEUKOCYTE ESTERASE URINE, POC: NEGATIVE
NITRITE, POC UA: NEGATIVE
PH, POC UA: 6
PROTEIN, POC: ABNORMAL
SPECIFIC GRAVITY, POC UA: 1
UROBILINOGEN, POC UA: NORMAL

## 2021-05-25 PROCEDURE — 81002 URINALYSIS NONAUTO W/O SCOPE: CPT | Mod: S$GLB,,, | Performed by: NURSE PRACTITIONER

## 2021-05-25 PROCEDURE — 3008F PR BODY MASS INDEX (BMI) DOCUMENTED: ICD-10-PCS | Mod: CPTII,S$GLB,, | Performed by: NURSE PRACTITIONER

## 2021-05-25 PROCEDURE — 99214 PR OFFICE/OUTPT VISIT, EST, LEVL IV, 30-39 MIN: ICD-10-PCS | Mod: 25,S$GLB,, | Performed by: NURSE PRACTITIONER

## 2021-05-25 PROCEDURE — 3008F BODY MASS INDEX DOCD: CPT | Mod: CPTII,S$GLB,, | Performed by: NURSE PRACTITIONER

## 2021-05-25 PROCEDURE — 81002 POCT URINE DIPSTICK WITHOUT MICROSCOPE: ICD-10-PCS | Mod: S$GLB,,, | Performed by: NURSE PRACTITIONER

## 2021-05-25 PROCEDURE — 1126F PR PAIN SEVERITY QUANTIFIED, NO PAIN PRESENT: ICD-10-PCS | Mod: S$GLB,,, | Performed by: NURSE PRACTITIONER

## 2021-05-25 PROCEDURE — 1126F AMNT PAIN NOTED NONE PRSNT: CPT | Mod: S$GLB,,, | Performed by: NURSE PRACTITIONER

## 2021-05-25 PROCEDURE — 99214 OFFICE O/P EST MOD 30 MIN: CPT | Mod: 25,S$GLB,, | Performed by: NURSE PRACTITIONER

## 2021-05-25 RX ORDER — FLUCONAZOLE 150 MG/1
150 TABLET ORAL DAILY
Qty: 1 TABLET | Refills: 0 | Status: SHIPPED | OUTPATIENT
Start: 2021-05-25 | End: 2021-05-26

## 2021-05-25 RX ORDER — CIPROFLOXACIN 250 MG/1
250 TABLET, FILM COATED ORAL 2 TIMES DAILY
Qty: 10 TABLET | Refills: 0 | Status: SHIPPED | OUTPATIENT
Start: 2021-05-25 | End: 2021-05-30

## 2021-05-28 ENCOUNTER — PATIENT MESSAGE (OUTPATIENT)
Dept: FAMILY MEDICINE | Facility: CLINIC | Age: 56
End: 2021-05-28

## 2021-05-28 ENCOUNTER — HOSPITAL ENCOUNTER (OUTPATIENT)
Dept: RADIOLOGY | Facility: HOSPITAL | Age: 56
Discharge: HOME | End: 2021-05-28
Attending: NURSE PRACTITIONER
Payer: COMMERCIAL

## 2021-05-28 ENCOUNTER — TELEPHONE (OUTPATIENT)
Dept: FAMILY MEDICINE | Facility: CLINIC | Age: 56
End: 2021-05-28

## 2021-05-28 DIAGNOSIS — R05.9 COUGH: Primary | ICD-10-CM

## 2021-05-28 DIAGNOSIS — J18.9 PNEUMONIA DUE TO INFECTIOUS ORGANISM, UNSPECIFIED LATERALITY, UNSPECIFIED PART OF LUNG: ICD-10-CM

## 2021-05-28 PROCEDURE — 71046 X-RAY EXAM CHEST 2 VIEWS: CPT | Mod: 26,,, | Performed by: RADIOLOGY

## 2021-05-28 PROCEDURE — 71046 X-RAY EXAM CHEST 2 VIEWS: CPT | Mod: TC,PN

## 2021-05-28 PROCEDURE — 71046 XR CHEST PA AND LATERAL: ICD-10-PCS | Mod: 26,,, | Performed by: RADIOLOGY

## 2021-06-03 ENCOUNTER — OFFICE VISIT (OUTPATIENT)
Dept: GASTROENTEROLOGY | Facility: CLINIC | Age: 56
End: 2021-06-03
Payer: COMMERCIAL

## 2021-06-03 ENCOUNTER — PATIENT MESSAGE (OUTPATIENT)
Dept: FAMILY MEDICINE | Facility: CLINIC | Age: 56
End: 2021-06-03

## 2021-06-03 ENCOUNTER — HOSPITAL ENCOUNTER (OUTPATIENT)
Dept: RADIOLOGY | Facility: HOSPITAL | Age: 56
Discharge: HOME | End: 2021-06-03
Attending: NURSE PRACTITIONER
Payer: COMMERCIAL

## 2021-06-03 ENCOUNTER — TELEPHONE (OUTPATIENT)
Dept: FAMILY MEDICINE | Facility: CLINIC | Age: 56
End: 2021-06-03

## 2021-06-03 VITALS — WEIGHT: 174.63 LBS | BODY MASS INDEX: 29.81 KG/M2 | HEIGHT: 64 IN

## 2021-06-03 DIAGNOSIS — R12 HEARTBURN: Primary | ICD-10-CM

## 2021-06-03 DIAGNOSIS — R10.9 ABDOMINAL CRAMPING: ICD-10-CM

## 2021-06-03 DIAGNOSIS — K44.9 HIATAL HERNIA: ICD-10-CM

## 2021-06-03 DIAGNOSIS — R05.9 COUGH: ICD-10-CM

## 2021-06-03 PROCEDURE — 3008F BODY MASS INDEX DOCD: CPT | Mod: CPTII,S$GLB,, | Performed by: NURSE PRACTITIONER

## 2021-06-03 PROCEDURE — 99214 PR OFFICE/OUTPT VISIT, EST, LEVL IV, 30-39 MIN: ICD-10-PCS | Mod: S$GLB,,, | Performed by: NURSE PRACTITIONER

## 2021-06-03 PROCEDURE — 3008F PR BODY MASS INDEX (BMI) DOCUMENTED: ICD-10-PCS | Mod: CPTII,S$GLB,, | Performed by: NURSE PRACTITIONER

## 2021-06-03 PROCEDURE — 1126F PR PAIN SEVERITY QUANTIFIED, NO PAIN PRESENT: ICD-10-PCS | Mod: S$GLB,,, | Performed by: NURSE PRACTITIONER

## 2021-06-03 PROCEDURE — 71046 X-RAY EXAM CHEST 2 VIEWS: CPT | Mod: 26,,, | Performed by: RADIOLOGY

## 2021-06-03 PROCEDURE — 1126F AMNT PAIN NOTED NONE PRSNT: CPT | Mod: S$GLB,,, | Performed by: NURSE PRACTITIONER

## 2021-06-03 PROCEDURE — 71046 X-RAY EXAM CHEST 2 VIEWS: CPT | Mod: TC,FY,PO

## 2021-06-03 PROCEDURE — 71046 XR CHEST PA AND LATERAL: ICD-10-PCS | Mod: 26,,, | Performed by: RADIOLOGY

## 2021-06-03 PROCEDURE — 99999 PR PBB SHADOW E&M-EST. PATIENT-LVL III: ICD-10-PCS | Mod: PBBFAC,,, | Performed by: NURSE PRACTITIONER

## 2021-06-03 PROCEDURE — 99214 OFFICE O/P EST MOD 30 MIN: CPT | Mod: S$GLB,,, | Performed by: NURSE PRACTITIONER

## 2021-06-03 PROCEDURE — 99999 PR PBB SHADOW E&M-EST. PATIENT-LVL III: CPT | Mod: PBBFAC,,, | Performed by: NURSE PRACTITIONER

## 2021-06-03 RX ORDER — DEXLANSOPRAZOLE 60 MG/1
60 CAPSULE, DELAYED RELEASE ORAL DAILY
Qty: 30 CAPSULE | Refills: 11 | Status: SHIPPED | OUTPATIENT
Start: 2021-06-03 | End: 2021-12-10 | Stop reason: ALTCHOICE

## 2021-06-03 RX ORDER — CALCIUM CARBONATE 200(500)MG
1 TABLET,CHEWABLE ORAL DAILY PRN
COMMUNITY

## 2021-06-03 RX ORDER — PHENAZOPYRIDINE HYDROCHLORIDE 200 MG/1
200 TABLET, FILM COATED ORAL 3 TIMES DAILY PRN
Qty: 20 TABLET | Refills: 0 | Status: SHIPPED | OUTPATIENT
Start: 2021-06-03 | End: 2021-06-13

## 2021-06-04 DIAGNOSIS — R30.0 DYSURIA: Primary | ICD-10-CM

## 2021-06-05 ENCOUNTER — LAB VISIT (OUTPATIENT)
Dept: LAB | Facility: HOSPITAL | Age: 56
End: 2021-06-05
Attending: NURSE PRACTITIONER
Payer: COMMERCIAL

## 2021-06-05 DIAGNOSIS — R30.0 DYSURIA: ICD-10-CM

## 2021-06-05 LAB
BACTERIA #/AREA URNS HPF: NORMAL /HPF
BILIRUB UR QL STRIP: NEGATIVE
CLARITY UR: CLEAR
COLOR UR: YELLOW
GLUCOSE UR QL STRIP: NEGATIVE
HGB UR QL STRIP: ABNORMAL
KETONES UR QL STRIP: NEGATIVE
LEUKOCYTE ESTERASE UR QL STRIP: ABNORMAL
MICROSCOPIC COMMENT: NORMAL
NITRITE UR QL STRIP: NEGATIVE
PH UR STRIP: 6 [PH] (ref 5–8)
PROT UR QL STRIP: NEGATIVE
RBC #/AREA URNS HPF: 2 /HPF (ref 0–4)
SP GR UR STRIP: 1.01 (ref 1–1.03)
SQUAMOUS #/AREA URNS HPF: 5 /HPF
URN SPEC COLLECT METH UR: ABNORMAL
WBC #/AREA URNS HPF: 2 /HPF (ref 0–5)

## 2021-06-05 PROCEDURE — 81000 URINALYSIS NONAUTO W/SCOPE: CPT | Mod: PO | Performed by: NURSE PRACTITIONER

## 2021-06-08 ENCOUNTER — PATIENT MESSAGE (OUTPATIENT)
Dept: FAMILY MEDICINE | Facility: CLINIC | Age: 56
End: 2021-06-08

## 2021-06-08 DIAGNOSIS — R31.9 HEMATURIA, UNSPECIFIED TYPE: Primary | ICD-10-CM

## 2021-06-11 ENCOUNTER — PATIENT MESSAGE (OUTPATIENT)
Dept: FAMILY MEDICINE | Facility: CLINIC | Age: 56
End: 2021-06-11

## 2021-06-23 ENCOUNTER — HOSPITAL ENCOUNTER (OUTPATIENT)
Dept: RADIOLOGY | Facility: HOSPITAL | Age: 56
Discharge: HOME | End: 2021-06-23
Attending: UROLOGY
Payer: COMMERCIAL

## 2021-06-23 ENCOUNTER — OFFICE VISIT (OUTPATIENT)
Dept: UROLOGY | Facility: CLINIC | Age: 56
End: 2021-06-23
Payer: COMMERCIAL

## 2021-06-23 VITALS — WEIGHT: 174.63 LBS | HEIGHT: 64 IN | BODY MASS INDEX: 29.81 KG/M2

## 2021-06-23 DIAGNOSIS — R31.29 MICROSCOPIC HEMATURIA: Primary | ICD-10-CM

## 2021-06-23 DIAGNOSIS — N20.0 KIDNEY STONE: ICD-10-CM

## 2021-06-23 LAB
BILIRUB SERPL-MCNC: NORMAL MG/DL
BLOOD URINE, POC: NORMAL
CLARITY, POC UA: CLEAR
COLOR, POC UA: YELLOW
GLUCOSE UR QL STRIP: NORMAL
KETONES UR QL STRIP: NORMAL
LEUKOCYTE ESTERASE URINE, POC: NORMAL
NITRITE, POC UA: NORMAL
PH, POC UA: 6
PROTEIN, POC: NORMAL
SPECIFIC GRAVITY, POC UA: 1.01
UROBILINOGEN, POC UA: 0.2

## 2021-06-23 PROCEDURE — 99204 OFFICE O/P NEW MOD 45 MIN: CPT | Mod: 25,S$GLB,, | Performed by: UROLOGY

## 2021-06-23 PROCEDURE — 99999 PR PBB SHADOW E&M-EST. PATIENT-LVL III: CPT | Mod: PBBFAC,,, | Performed by: UROLOGY

## 2021-06-23 PROCEDURE — 81002 POCT URINE DIPSTICK WITHOUT MICROSCOPE: ICD-10-PCS | Mod: S$GLB,,, | Performed by: UROLOGY

## 2021-06-23 PROCEDURE — 74018 XR KUB: ICD-10-PCS | Mod: 26,,, | Performed by: RADIOLOGY

## 2021-06-23 PROCEDURE — 3008F PR BODY MASS INDEX (BMI) DOCUMENTED: ICD-10-PCS | Mod: CPTII,S$GLB,, | Performed by: UROLOGY

## 2021-06-23 PROCEDURE — 99204 PR OFFICE/OUTPT VISIT, NEW, LEVL IV, 45-59 MIN: ICD-10-PCS | Mod: 25,S$GLB,, | Performed by: UROLOGY

## 2021-06-23 PROCEDURE — 3008F BODY MASS INDEX DOCD: CPT | Mod: CPTII,S$GLB,, | Performed by: UROLOGY

## 2021-06-23 PROCEDURE — 74018 RADEX ABDOMEN 1 VIEW: CPT | Mod: 26,,, | Performed by: RADIOLOGY

## 2021-06-23 PROCEDURE — 1126F AMNT PAIN NOTED NONE PRSNT: CPT | Mod: S$GLB,,, | Performed by: UROLOGY

## 2021-06-23 PROCEDURE — 99999 PR PBB SHADOW E&M-EST. PATIENT-LVL III: ICD-10-PCS | Mod: PBBFAC,,, | Performed by: UROLOGY

## 2021-06-23 PROCEDURE — 1126F PR PAIN SEVERITY QUANTIFIED, NO PAIN PRESENT: ICD-10-PCS | Mod: S$GLB,,, | Performed by: UROLOGY

## 2021-06-23 PROCEDURE — 74018 RADEX ABDOMEN 1 VIEW: CPT | Mod: TC,FY,PO

## 2021-06-23 PROCEDURE — 81002 URINALYSIS NONAUTO W/O SCOPE: CPT | Mod: S$GLB,,, | Performed by: UROLOGY

## 2021-07-16 ENCOUNTER — TELEPHONE (OUTPATIENT)
Dept: GASTROENTEROLOGY | Facility: CLINIC | Age: 56
End: 2021-07-16

## 2021-07-21 ENCOUNTER — TELEPHONE (OUTPATIENT)
Dept: GASTROENTEROLOGY | Facility: CLINIC | Age: 56
End: 2021-07-21

## 2021-07-23 ENCOUNTER — HOSPITAL ENCOUNTER (OUTPATIENT)
Facility: HOSPITAL | Age: 56
Discharge: HOME | End: 2021-07-23
Attending: INTERNAL MEDICINE | Admitting: INTERNAL MEDICINE
Payer: COMMERCIAL

## 2021-07-23 ENCOUNTER — ANESTHESIA EVENT (OUTPATIENT)
Dept: ENDOSCOPY | Facility: HOSPITAL | Age: 56
End: 2021-07-23
Payer: COMMERCIAL

## 2021-07-23 ENCOUNTER — ANESTHESIA (OUTPATIENT)
Dept: ENDOSCOPY | Facility: HOSPITAL | Age: 56
End: 2021-07-23
Payer: COMMERCIAL

## 2021-07-23 DIAGNOSIS — K21.9 GERD (GASTROESOPHAGEAL REFLUX DISEASE): ICD-10-CM

## 2021-07-23 LAB
B-HCG UR QL: NEGATIVE
CTP QC/QA: YES

## 2021-07-23 PROCEDURE — 63600175 PHARM REV CODE 636 W HCPCS: Mod: PO | Performed by: INTERNAL MEDICINE

## 2021-07-23 PROCEDURE — 43239 EGD BIOPSY SINGLE/MULTIPLE: CPT | Mod: ,,, | Performed by: INTERNAL MEDICINE

## 2021-07-23 PROCEDURE — 88305 TISSUE EXAM BY PATHOLOGIST: CPT | Mod: 26,,, | Performed by: PATHOLOGY

## 2021-07-23 PROCEDURE — 27201012 HC FORCEPS, HOT/COLD, DISP: Mod: PO | Performed by: INTERNAL MEDICINE

## 2021-07-23 PROCEDURE — 88305 TISSUE EXAM BY PATHOLOGIST: CPT | Performed by: PATHOLOGY

## 2021-07-23 PROCEDURE — 43239 PR EGD, FLEX, W/BIOPSY, SGL/MULTI: ICD-10-PCS | Mod: ,,, | Performed by: INTERNAL MEDICINE

## 2021-07-23 PROCEDURE — 88305 TISSUE EXAM BY PATHOLOGIST: ICD-10-PCS | Mod: 26,,, | Performed by: PATHOLOGY

## 2021-07-23 PROCEDURE — 25000003 PHARM REV CODE 250: Mod: PO | Performed by: NURSE ANESTHETIST, CERTIFIED REGISTERED

## 2021-07-23 PROCEDURE — D9220A PRA ANESTHESIA: Mod: CRNA,,, | Performed by: NURSE ANESTHETIST, CERTIFIED REGISTERED

## 2021-07-23 PROCEDURE — D9220A PRA ANESTHESIA: Mod: ANES,,, | Performed by: ANESTHESIOLOGY

## 2021-07-23 PROCEDURE — 63600175 PHARM REV CODE 636 W HCPCS: Mod: PO | Performed by: NURSE ANESTHETIST, CERTIFIED REGISTERED

## 2021-07-23 PROCEDURE — 37000008 HC ANESTHESIA 1ST 15 MINUTES: Mod: PO | Performed by: INTERNAL MEDICINE

## 2021-07-23 PROCEDURE — 43239 EGD BIOPSY SINGLE/MULTIPLE: CPT | Mod: PO | Performed by: INTERNAL MEDICINE

## 2021-07-23 PROCEDURE — 37000009 HC ANESTHESIA EA ADD 15 MINS: Mod: PO | Performed by: INTERNAL MEDICINE

## 2021-07-23 PROCEDURE — 81025 URINE PREGNANCY TEST: CPT | Mod: PO | Performed by: INTERNAL MEDICINE

## 2021-07-23 PROCEDURE — D9220A PRA ANESTHESIA: ICD-10-PCS | Mod: CRNA,,, | Performed by: NURSE ANESTHETIST, CERTIFIED REGISTERED

## 2021-07-23 PROCEDURE — D9220A PRA ANESTHESIA: ICD-10-PCS | Mod: ANES,,, | Performed by: ANESTHESIOLOGY

## 2021-07-23 RX ORDER — LIDOCAINE HCL/PF 100 MG/5ML
SYRINGE (ML) INTRAVENOUS
Status: DISCONTINUED | OUTPATIENT
Start: 2021-07-23 | End: 2021-07-23

## 2021-07-23 RX ORDER — SODIUM CHLORIDE, SODIUM LACTATE, POTASSIUM CHLORIDE, CALCIUM CHLORIDE 600; 310; 30; 20 MG/100ML; MG/100ML; MG/100ML; MG/100ML
INJECTION, SOLUTION INTRAVENOUS CONTINUOUS
Status: DISCONTINUED | OUTPATIENT
Start: 2021-07-23 | End: 2021-07-23 | Stop reason: HOSPADM

## 2021-07-23 RX ORDER — FENTANYL CITRATE 50 UG/ML
INJECTION, SOLUTION INTRAMUSCULAR; INTRAVENOUS
Status: DISCONTINUED | OUTPATIENT
Start: 2021-07-23 | End: 2021-07-23

## 2021-07-23 RX ORDER — SODIUM CHLORIDE 0.9 % (FLUSH) 0.9 %
10 SYRINGE (ML) INJECTION
Status: DISCONTINUED | OUTPATIENT
Start: 2021-07-23 | End: 2021-07-23 | Stop reason: HOSPADM

## 2021-07-23 RX ORDER — PROPOFOL 10 MG/ML
VIAL (ML) INTRAVENOUS
Status: DISCONTINUED | OUTPATIENT
Start: 2021-07-23 | End: 2021-07-23

## 2021-07-23 RX ADMIN — PROPOFOL 25 MG: 10 INJECTION, EMULSION INTRAVENOUS at 08:07

## 2021-07-23 RX ADMIN — LIDOCAINE HYDROCHLORIDE 100 MG: 20 INJECTION, SOLUTION INTRAVENOUS at 08:07

## 2021-07-23 RX ADMIN — PROPOFOL 50 MG: 10 INJECTION, EMULSION INTRAVENOUS at 08:07

## 2021-07-23 RX ADMIN — FENTANYL CITRATE 100 MCG: 50 INJECTION, SOLUTION INTRAMUSCULAR; INTRAVENOUS at 08:07

## 2021-07-23 RX ADMIN — SODIUM CHLORIDE, SODIUM LACTATE, POTASSIUM CHLORIDE, AND CALCIUM CHLORIDE: .6; .31; .03; .02 INJECTION, SOLUTION INTRAVENOUS at 07:07

## 2021-07-23 RX ADMIN — PROPOFOL 100 MG: 10 INJECTION, EMULSION INTRAVENOUS at 08:07

## 2021-07-26 VITALS
RESPIRATION RATE: 16 BRPM | SYSTOLIC BLOOD PRESSURE: 128 MMHG | OXYGEN SATURATION: 98 % | DIASTOLIC BLOOD PRESSURE: 70 MMHG | HEIGHT: 64 IN | TEMPERATURE: 98 F | HEART RATE: 77 BPM | BODY MASS INDEX: 29.37 KG/M2 | WEIGHT: 172 LBS

## 2021-07-29 LAB
FINAL PATHOLOGIC DIAGNOSIS: NORMAL
GROSS: NORMAL
Lab: NORMAL

## 2021-10-15 ENCOUNTER — OFFICE VISIT (OUTPATIENT)
Dept: FAMILY MEDICINE | Facility: CLINIC | Age: 56
End: 2021-10-15
Payer: COMMERCIAL

## 2021-10-15 VITALS
WEIGHT: 172.5 LBS | TEMPERATURE: 98 F | HEIGHT: 64 IN | OXYGEN SATURATION: 96 % | SYSTOLIC BLOOD PRESSURE: 112 MMHG | RESPIRATION RATE: 18 BRPM | HEART RATE: 98 BPM | BODY MASS INDEX: 29.45 KG/M2 | DIASTOLIC BLOOD PRESSURE: 64 MMHG

## 2021-10-15 DIAGNOSIS — R39.9 UTI SYMPTOMS: Primary | ICD-10-CM

## 2021-10-15 DIAGNOSIS — B30.9 ACUTE VIRAL CONJUNCTIVITIS OF BOTH EYES: ICD-10-CM

## 2021-10-15 DIAGNOSIS — J30.9 ALLERGIC RHINITIS, UNSPECIFIED SEASONALITY, UNSPECIFIED TRIGGER: ICD-10-CM

## 2021-10-15 LAB
BILIRUB SERPL-MCNC: NEGATIVE MG/DL
BLOOD URINE, POC: ABNORMAL
CLARITY, POC UA: CLEAR
COLOR, POC UA: YELLOW
GLUCOSE UR QL STRIP: NORMAL
KETONES UR QL STRIP: NEGATIVE
LEUKOCYTE ESTERASE URINE, POC: NEGATIVE
NITRITE, POC UA: NEGATIVE
PH, POC UA: 5
PROTEIN, POC: NEGATIVE
SPECIFIC GRAVITY, POC UA: 1.02
UROBILINOGEN, POC UA: NORMAL

## 2021-10-15 PROCEDURE — 3008F PR BODY MASS INDEX (BMI) DOCUMENTED: ICD-10-PCS | Mod: CPTII,S$GLB,, | Performed by: NURSE PRACTITIONER

## 2021-10-15 PROCEDURE — 1159F MED LIST DOCD IN RCRD: CPT | Mod: CPTII,S$GLB,, | Performed by: NURSE PRACTITIONER

## 2021-10-15 PROCEDURE — 1160F PR REVIEW ALL MEDS BY PRESCRIBER/CLIN PHARMACIST DOCUMENTED: ICD-10-PCS | Mod: CPTII,S$GLB,, | Performed by: NURSE PRACTITIONER

## 2021-10-15 PROCEDURE — 3074F PR MOST RECENT SYSTOLIC BLOOD PRESSURE < 130 MM HG: ICD-10-PCS | Mod: CPTII,S$GLB,, | Performed by: NURSE PRACTITIONER

## 2021-10-15 PROCEDURE — 3078F DIAST BP <80 MM HG: CPT | Mod: CPTII,S$GLB,, | Performed by: NURSE PRACTITIONER

## 2021-10-15 PROCEDURE — 1160F RVW MEDS BY RX/DR IN RCRD: CPT | Mod: CPTII,S$GLB,, | Performed by: NURSE PRACTITIONER

## 2021-10-15 PROCEDURE — 81002 URINALYSIS NONAUTO W/O SCOPE: CPT | Mod: S$GLB,,, | Performed by: NURSE PRACTITIONER

## 2021-10-15 PROCEDURE — 99214 OFFICE O/P EST MOD 30 MIN: CPT | Mod: 25,S$GLB,, | Performed by: NURSE PRACTITIONER

## 2021-10-15 PROCEDURE — 81002 POCT URINE DIPSTICK WITHOUT MICROSCOPE: ICD-10-PCS | Mod: S$GLB,,, | Performed by: NURSE PRACTITIONER

## 2021-10-15 PROCEDURE — 99214 PR OFFICE/OUTPT VISIT, EST, LEVL IV, 30-39 MIN: ICD-10-PCS | Mod: 25,S$GLB,, | Performed by: NURSE PRACTITIONER

## 2021-10-15 PROCEDURE — 3078F PR MOST RECENT DIASTOLIC BLOOD PRESSURE < 80 MM HG: ICD-10-PCS | Mod: CPTII,S$GLB,, | Performed by: NURSE PRACTITIONER

## 2021-10-15 PROCEDURE — 3074F SYST BP LT 130 MM HG: CPT | Mod: CPTII,S$GLB,, | Performed by: NURSE PRACTITIONER

## 2021-10-15 PROCEDURE — 3008F BODY MASS INDEX DOCD: CPT | Mod: CPTII,S$GLB,, | Performed by: NURSE PRACTITIONER

## 2021-10-15 PROCEDURE — 1159F PR MEDICATION LIST DOCUMENTED IN MEDICAL RECORD: ICD-10-PCS | Mod: CPTII,S$GLB,, | Performed by: NURSE PRACTITIONER

## 2021-10-20 ENCOUNTER — TELEPHONE (OUTPATIENT)
Dept: FAMILY MEDICINE | Facility: CLINIC | Age: 56
End: 2021-10-20

## 2021-10-20 RX ORDER — NEOMYCIN SULFATE, POLYMYXIN B SULFATE AND DEXAMETHASONE 3.5; 10000; 1 MG/ML; [USP'U]/ML; MG/ML
1 SUSPENSION/ DROPS OPHTHALMIC
Qty: 5 ML | Refills: 1 | Status: SHIPPED | OUTPATIENT
Start: 2021-10-20 | End: 2021-12-10 | Stop reason: ALTCHOICE

## 2021-10-22 ENCOUNTER — TELEPHONE (OUTPATIENT)
Dept: FAMILY MEDICINE | Facility: CLINIC | Age: 56
End: 2021-10-22

## 2021-10-27 ENCOUNTER — IMMUNIZATION (OUTPATIENT)
Dept: FAMILY MEDICINE | Facility: CLINIC | Age: 56
End: 2021-10-27
Payer: COMMERCIAL

## 2021-10-27 PROCEDURE — 90471 FLU VACCINE (QUAD) GREATER THAN OR EQUAL TO 3YO PRESERVATIVE FREE IM: ICD-10-PCS | Mod: S$GLB,,, | Performed by: NURSE PRACTITIONER

## 2021-10-27 PROCEDURE — 90686 FLU VACCINE (QUAD) GREATER THAN OR EQUAL TO 3YO PRESERVATIVE FREE IM: ICD-10-PCS | Mod: S$GLB,,, | Performed by: NURSE PRACTITIONER

## 2021-10-27 PROCEDURE — 90471 IMMUNIZATION ADMIN: CPT | Mod: S$GLB,,, | Performed by: NURSE PRACTITIONER

## 2021-10-27 PROCEDURE — 90686 IIV4 VACC NO PRSV 0.5 ML IM: CPT | Mod: S$GLB,,, | Performed by: NURSE PRACTITIONER

## 2021-12-01 ENCOUNTER — TELEPHONE (OUTPATIENT)
Dept: FAMILY MEDICINE | Facility: CLINIC | Age: 56
End: 2021-12-01
Payer: COMMERCIAL

## 2021-12-01 DIAGNOSIS — Z00.00 LABORATORY EXAM ORDERED AS PART OF ROUTINE GENERAL MEDICAL EXAMINATION: Primary | ICD-10-CM

## 2021-12-09 ENCOUNTER — LAB VISIT (OUTPATIENT)
Dept: LAB | Facility: HOSPITAL | Age: 56
End: 2021-12-09
Payer: COMMERCIAL

## 2021-12-09 DIAGNOSIS — Z00.00 LABORATORY EXAM ORDERED AS PART OF ROUTINE GENERAL MEDICAL EXAMINATION: ICD-10-CM

## 2021-12-09 LAB
ALBUMIN SERPL BCP-MCNC: 4 G/DL (ref 3.5–5.2)
ALP SERPL-CCNC: 82 U/L (ref 55–135)
ALT SERPL W/O P-5'-P-CCNC: 15 U/L (ref 10–44)
ANION GAP SERPL CALC-SCNC: 12 MMOL/L (ref 8–16)
AST SERPL-CCNC: 16 U/L (ref 10–40)
BASOPHILS # BLD AUTO: 0.04 K/UL (ref 0–0.2)
BASOPHILS NFR BLD: 0.9 % (ref 0–1.9)
BILIRUB SERPL-MCNC: 0.6 MG/DL (ref 0.1–1)
BUN SERPL-MCNC: 16 MG/DL (ref 6–20)
CALCIUM SERPL-MCNC: 9.7 MG/DL (ref 8.7–10.5)
CHLORIDE SERPL-SCNC: 106 MMOL/L (ref 95–110)
CHOLEST SERPL-MCNC: 188 MG/DL (ref 120–199)
CHOLEST/HDLC SERPL: 3.8 {RATIO} (ref 2–5)
CO2 SERPL-SCNC: 24 MMOL/L (ref 23–29)
CREAT SERPL-MCNC: 1 MG/DL (ref 0.5–1.4)
DIFFERENTIAL METHOD: ABNORMAL
EOSINOPHIL # BLD AUTO: 0.1 K/UL (ref 0–0.5)
EOSINOPHIL NFR BLD: 2.4 % (ref 0–8)
ERYTHROCYTE [DISTWIDTH] IN BLOOD BY AUTOMATED COUNT: 13.2 % (ref 11.5–14.5)
EST. GFR  (AFRICAN AMERICAN): >60 ML/MIN/1.73 M^2
EST. GFR  (NON AFRICAN AMERICAN): >60 ML/MIN/1.73 M^2
GLUCOSE SERPL-MCNC: 87 MG/DL (ref 70–110)
HCT VFR BLD AUTO: 45.4 % (ref 37–48.5)
HDLC SERPL-MCNC: 49 MG/DL (ref 40–75)
HDLC SERPL: 26.1 % (ref 20–50)
HGB BLD-MCNC: 14.4 G/DL (ref 12–16)
IMM GRANULOCYTES # BLD AUTO: 0.01 K/UL (ref 0–0.04)
IMM GRANULOCYTES NFR BLD AUTO: 0.2 % (ref 0–0.5)
LDLC SERPL CALC-MCNC: 96.2 MG/DL (ref 63–159)
LYMPHOCYTES # BLD AUTO: 1.4 K/UL (ref 1–4.8)
LYMPHOCYTES NFR BLD: 30.8 % (ref 18–48)
MCH RBC QN AUTO: 30.3 PG (ref 27–31)
MCHC RBC AUTO-ENTMCNC: 31.7 G/DL (ref 32–36)
MCV RBC AUTO: 95 FL (ref 82–98)
MONOCYTES # BLD AUTO: 0.4 K/UL (ref 0.3–1)
MONOCYTES NFR BLD: 8.7 % (ref 4–15)
NEUTROPHILS # BLD AUTO: 2.6 K/UL (ref 1.8–7.7)
NEUTROPHILS NFR BLD: 57 % (ref 38–73)
NONHDLC SERPL-MCNC: 139 MG/DL
NRBC BLD-RTO: 0 /100 WBC
PLATELET # BLD AUTO: 213 K/UL (ref 150–450)
PMV BLD AUTO: 9.9 FL (ref 9.2–12.9)
POTASSIUM SERPL-SCNC: 4 MMOL/L (ref 3.5–5.1)
PROT SERPL-MCNC: 7.1 G/DL (ref 6–8.4)
RBC # BLD AUTO: 4.76 M/UL (ref 4–5.4)
SODIUM SERPL-SCNC: 142 MMOL/L (ref 136–145)
TRIGL SERPL-MCNC: 214 MG/DL (ref 30–150)
TSH SERPL DL<=0.005 MIU/L-ACNC: 1.89 UIU/ML (ref 0.4–4)
WBC # BLD AUTO: 4.58 K/UL (ref 3.9–12.7)

## 2021-12-09 PROCEDURE — 80053 COMPREHEN METABOLIC PANEL: CPT | Performed by: NURSE PRACTITIONER

## 2021-12-09 PROCEDURE — 85025 COMPLETE CBC W/AUTO DIFF WBC: CPT | Performed by: NURSE PRACTITIONER

## 2021-12-09 PROCEDURE — 84443 ASSAY THYROID STIM HORMONE: CPT | Performed by: NURSE PRACTITIONER

## 2021-12-09 PROCEDURE — 80061 LIPID PANEL: CPT | Performed by: NURSE PRACTITIONER

## 2021-12-09 PROCEDURE — 36415 COLL VENOUS BLD VENIPUNCTURE: CPT | Mod: PN | Performed by: NURSE PRACTITIONER

## 2021-12-10 ENCOUNTER — OFFICE VISIT (OUTPATIENT)
Dept: FAMILY MEDICINE | Facility: CLINIC | Age: 56
End: 2021-12-10
Payer: COMMERCIAL

## 2021-12-10 VITALS
TEMPERATURE: 98 F | SYSTOLIC BLOOD PRESSURE: 104 MMHG | BODY MASS INDEX: 29.38 KG/M2 | DIASTOLIC BLOOD PRESSURE: 60 MMHG | WEIGHT: 172.06 LBS | HEIGHT: 64 IN | RESPIRATION RATE: 20 BRPM | HEART RATE: 100 BPM | OXYGEN SATURATION: 96 %

## 2021-12-10 DIAGNOSIS — K21.00 GASTROESOPHAGEAL REFLUX DISEASE WITH ESOPHAGITIS WITHOUT HEMORRHAGE: ICD-10-CM

## 2021-12-10 DIAGNOSIS — F41.9 ANXIETY: ICD-10-CM

## 2021-12-10 DIAGNOSIS — Z00.00 ANNUAL PHYSICAL EXAM: Primary | ICD-10-CM

## 2021-12-10 DIAGNOSIS — D50.9 IRON DEFICIENCY ANEMIA, UNSPECIFIED IRON DEFICIENCY ANEMIA TYPE: ICD-10-CM

## 2021-12-10 DIAGNOSIS — K58.2 IRRITABLE BOWEL SYNDROME WITH BOTH CONSTIPATION AND DIARRHEA: ICD-10-CM

## 2021-12-10 DIAGNOSIS — E55.9 VITAMIN D DEFICIENCY: ICD-10-CM

## 2021-12-10 PROCEDURE — 99396 PREV VISIT EST AGE 40-64: CPT | Mod: S$GLB,,, | Performed by: NURSE PRACTITIONER

## 2021-12-10 PROCEDURE — 99396 PR PREVENTIVE VISIT,EST,40-64: ICD-10-PCS | Mod: S$GLB,,, | Performed by: NURSE PRACTITIONER

## 2021-12-10 RX ORDER — DICYCLOMINE HYDROCHLORIDE 10 MG/1
10 CAPSULE ORAL 2 TIMES DAILY PRN
Qty: 60 CAPSULE | Refills: 2 | Status: SHIPPED | OUTPATIENT
Start: 2021-12-10 | End: 2022-01-09

## 2021-12-16 ENCOUNTER — LAB VISIT (OUTPATIENT)
Dept: LAB | Facility: HOSPITAL | Age: 56
End: 2021-12-16
Attending: NURSE PRACTITIONER
Payer: COMMERCIAL

## 2021-12-16 DIAGNOSIS — E55.9 VITAMIN D DEFICIENCY: ICD-10-CM

## 2021-12-16 DIAGNOSIS — D50.9 IRON DEFICIENCY ANEMIA, UNSPECIFIED IRON DEFICIENCY ANEMIA TYPE: ICD-10-CM

## 2021-12-16 LAB
25(OH)D3+25(OH)D2 SERPL-MCNC: 28 NG/ML (ref 30–96)
FERRITIN SERPL-MCNC: 24 NG/ML (ref 20–300)
IRON SERPL-MCNC: 109 UG/DL (ref 30–160)
SATURATED IRON: 26 % (ref 20–50)
TOTAL IRON BINDING CAPACITY: 423 UG/DL (ref 250–450)
TRANSFERRIN SERPL-MCNC: 286 MG/DL (ref 200–375)

## 2021-12-16 PROCEDURE — 82728 ASSAY OF FERRITIN: CPT | Performed by: NURSE PRACTITIONER

## 2021-12-16 PROCEDURE — 82306 VITAMIN D 25 HYDROXY: CPT | Performed by: NURSE PRACTITIONER

## 2021-12-16 PROCEDURE — 84466 ASSAY OF TRANSFERRIN: CPT | Performed by: NURSE PRACTITIONER

## 2021-12-16 PROCEDURE — 36415 COLL VENOUS BLD VENIPUNCTURE: CPT | Mod: PN | Performed by: NURSE PRACTITIONER

## 2021-12-17 ENCOUNTER — IMMUNIZATION (OUTPATIENT)
Dept: FAMILY MEDICINE | Facility: CLINIC | Age: 56
End: 2021-12-17
Payer: COMMERCIAL

## 2021-12-17 DIAGNOSIS — Z23 NEED FOR VACCINATION: Primary | ICD-10-CM

## 2021-12-17 PROCEDURE — 0004A COVID-19, MRNA, LNP-S, PF, 30 MCG/0.3 ML DOSE VACCINE: CPT | Mod: PBBFAC | Performed by: RADIOLOGY

## 2021-12-18 ENCOUNTER — PATIENT MESSAGE (OUTPATIENT)
Dept: FAMILY MEDICINE | Facility: CLINIC | Age: 56
End: 2021-12-18
Payer: COMMERCIAL

## 2021-12-20 ENCOUNTER — PATIENT MESSAGE (OUTPATIENT)
Dept: FAMILY MEDICINE | Facility: CLINIC | Age: 56
End: 2021-12-20
Payer: COMMERCIAL

## 2021-12-20 RX ORDER — ERGOCALCIFEROL 1.25 MG/1
50000 CAPSULE ORAL
Qty: 12 CAPSULE | Refills: 3 | Status: SHIPPED | OUTPATIENT
Start: 2021-12-20 | End: 2022-11-25 | Stop reason: ALTCHOICE

## 2021-12-27 RX ORDER — ESOMEPRAZOLE MAGNESIUM 40 MG/1
40 CAPSULE, DELAYED RELEASE ORAL DAILY
Qty: 90 CAPSULE | Refills: 1 | Status: SHIPPED | OUTPATIENT
Start: 2021-12-27 | End: 2022-01-03

## 2021-12-29 ENCOUNTER — TELEPHONE (OUTPATIENT)
Dept: FAMILY MEDICINE | Facility: CLINIC | Age: 56
End: 2021-12-29
Payer: COMMERCIAL

## 2022-01-03 RX ORDER — PANTOPRAZOLE SODIUM 40 MG/1
40 TABLET, DELAYED RELEASE ORAL DAILY
Qty: 30 TABLET | Refills: 11 | Status: SHIPPED | OUTPATIENT
Start: 2022-01-03 | End: 2022-05-27

## 2022-03-10 ENCOUNTER — PATIENT OUTREACH (OUTPATIENT)
Dept: ADMINISTRATIVE | Facility: OTHER | Age: 57
End: 2022-03-10
Payer: COMMERCIAL

## 2022-03-10 ENCOUNTER — TELEPHONE (OUTPATIENT)
Dept: UROLOGY | Facility: CLINIC | Age: 57
End: 2022-03-10
Payer: COMMERCIAL

## 2022-03-10 NOTE — TELEPHONE ENCOUNTER
----- Message from Alycia Santizo sent at 3/10/2022  9:05 AM CST -----  Contact: patient  Type:  Same Day Appointment Request    Caller is requesting a same day appointment.  Caller declined first available appointment listed below.      Name of Caller:    patient  When is the first available appointment?   Symptoms:ER Donna on 03/09/22 dxkidney stone right side  Best Call Back Number:  [phone] (home)   Additional Information:    Ct Scan revealed kidney stone. She was told to follow up today. Please call patient to schedule. Thanks!

## 2022-03-10 NOTE — TELEPHONE ENCOUNTER
----- Message from Alycia Santizo sent at 3/10/2022  9:05 AM CST -----  Contact: patient  Type:  Same Day Appointment Request    Caller is requesting a same day appointment.  Caller declined first available appointment listed below.      Name of Caller:    patient  When is the first available appointment?   Symptoms:ER Donna on 03/09/22 dxkidney stone right side  Best Call Back Number:  [phone] (home)   Additional Information:    Ct Scan revealed kidney stone. She was told to follow up today. Please call patient to schedule. Thanks!

## 2022-03-11 ENCOUNTER — OFFICE VISIT (OUTPATIENT)
Dept: UROLOGY | Facility: CLINIC | Age: 57
End: 2022-03-11
Payer: COMMERCIAL

## 2022-03-11 ENCOUNTER — TELEPHONE (OUTPATIENT)
Dept: UROLOGY | Facility: CLINIC | Age: 57
End: 2022-03-11
Payer: COMMERCIAL

## 2022-03-11 VITALS — BODY MASS INDEX: 29.35 KG/M2 | HEIGHT: 64 IN | WEIGHT: 171.94 LBS

## 2022-03-11 DIAGNOSIS — N20.0 KIDNEY STONE: Primary | ICD-10-CM

## 2022-03-11 LAB
BILIRUB SERPL-MCNC: ABNORMAL MG/DL
BLOOD URINE, POC: ABNORMAL
CLARITY, POC UA: CLEAR
COLOR, POC UA: YELLOW
GLUCOSE UR QL STRIP: ABNORMAL
KETONES UR QL STRIP: ABNORMAL
LEUKOCYTE ESTERASE URINE, POC: ABNORMAL
NITRITE, POC UA: ABNORMAL
PH, POC UA: 6
PROTEIN, POC: 30
SPECIFIC GRAVITY, POC UA: 1.02
UROBILINOGEN, POC UA: 0.2

## 2022-03-11 PROCEDURE — 82365 CALCULUS SPECTROSCOPY: CPT | Performed by: UROLOGY

## 2022-03-11 PROCEDURE — 99213 OFFICE O/P EST LOW 20 MIN: CPT | Mod: S$GLB,,, | Performed by: UROLOGY

## 2022-03-11 PROCEDURE — 3008F BODY MASS INDEX DOCD: CPT | Mod: CPTII,S$GLB,, | Performed by: UROLOGY

## 2022-03-11 PROCEDURE — 81002 POCT URINE DIPSTICK WITHOUT MICROSCOPE: ICD-10-PCS | Mod: S$GLB,,, | Performed by: UROLOGY

## 2022-03-11 PROCEDURE — 3008F PR BODY MASS INDEX (BMI) DOCUMENTED: ICD-10-PCS | Mod: CPTII,S$GLB,, | Performed by: UROLOGY

## 2022-03-11 PROCEDURE — 1160F PR REVIEW ALL MEDS BY PRESCRIBER/CLIN PHARMACIST DOCUMENTED: ICD-10-PCS | Mod: CPTII,S$GLB,, | Performed by: UROLOGY

## 2022-03-11 PROCEDURE — 99999 PR PBB SHADOW E&M-EST. PATIENT-LVL III: CPT | Mod: PBBFAC,,, | Performed by: UROLOGY

## 2022-03-11 PROCEDURE — 99999 PR PBB SHADOW E&M-EST. PATIENT-LVL III: ICD-10-PCS | Mod: PBBFAC,,, | Performed by: UROLOGY

## 2022-03-11 PROCEDURE — 1159F MED LIST DOCD IN RCRD: CPT | Mod: CPTII,S$GLB,, | Performed by: UROLOGY

## 2022-03-11 PROCEDURE — 81002 URINALYSIS NONAUTO W/O SCOPE: CPT | Mod: S$GLB,,, | Performed by: UROLOGY

## 2022-03-11 PROCEDURE — 99213 PR OFFICE/OUTPT VISIT, EST, LEVL III, 20-29 MIN: ICD-10-PCS | Mod: S$GLB,,, | Performed by: UROLOGY

## 2022-03-11 PROCEDURE — 1160F RVW MEDS BY RX/DR IN RCRD: CPT | Mod: CPTII,S$GLB,, | Performed by: UROLOGY

## 2022-03-11 PROCEDURE — 1159F PR MEDICATION LIST DOCUMENTED IN MEDICAL RECORD: ICD-10-PCS | Mod: CPTII,S$GLB,, | Performed by: UROLOGY

## 2022-03-11 RX ORDER — CHLORHEXIDINE GLUCONATE ORAL RINSE 1.2 MG/ML
SOLUTION DENTAL DAILY PRN
COMMUNITY
Start: 2022-01-13

## 2022-03-11 RX ORDER — ONDANSETRON 4 MG/1
4 TABLET, ORALLY DISINTEGRATING ORAL EVERY 6 HOURS PRN
COMMUNITY
Start: 2022-03-09 | End: 2022-11-30

## 2022-03-11 RX ORDER — VIT C/E/ZN/COPPR/LUTEIN/ZEAXAN 250MG-90MG
2000 CAPSULE ORAL
COMMUNITY

## 2022-03-11 RX ORDER — HYDROCODONE BITARTRATE AND ACETAMINOPHEN 5; 325 MG/1; MG/1
1 TABLET ORAL 4 TIMES DAILY PRN
COMMUNITY
Start: 2022-03-09 | End: 2022-05-27

## 2022-03-11 RX ORDER — DICYCLOMINE HYDROCHLORIDE 10 MG/1
20 CAPSULE ORAL DAILY PRN
COMMUNITY
End: 2022-12-21

## 2022-03-11 RX ORDER — ESOMEPRAZOLE MAGNESIUM 40 MG/1
40 CAPSULE, DELAYED RELEASE ORAL DAILY
COMMUNITY
Start: 2022-02-25 | End: 2022-05-24

## 2022-03-11 RX ORDER — TAMSULOSIN HYDROCHLORIDE 0.4 MG/1
1 CAPSULE ORAL DAILY
COMMUNITY
Start: 2022-03-09 | End: 2022-11-30

## 2022-03-11 RX ORDER — MISOPROSTOL 100 UG/1
TABLET ORAL
COMMUNITY
End: 2022-05-27

## 2022-03-11 RX ORDER — LIOTHYRONINE SODIUM 5 UG/1
TABLET ORAL
COMMUNITY
End: 2022-05-27

## 2022-03-11 NOTE — PROGRESS NOTES
Health Maintenance Due   Topic Date Due    Pneumococcal Vaccines (Age 0-64) (1 of 2 - PPSV23) Never done    Shingles Vaccine (1 of 2) Never done     Updates were requested from care everywhere.  Chart was reviewed for overdue Proactive [facility] Encounters (NIK) topics (CRS, Breast Cancer Screening, Eye exam)  Health Maintenance has been updated.  LINKS immunization registry triggered.  Immunizations were reconciled.

## 2022-03-11 NOTE — TELEPHONE ENCOUNTER
Spoke with patient, appointment scheduled for today @ 1030 am with Dr Cruz. Patient expressed understanding.

## 2022-03-11 NOTE — PROGRESS NOTES
Subjective:       Patient ID: Lavern Hodges is a 56 y.o. female.    Chief Complaint: Nephrolithiasis (5mm stone at [place] and passed it this morning!)    HPI     56-year-old with acute onset of right flank pain.  The pain was severe and she was seen in the emergency room at [place].  She underwent a CT scan which noted a 5 mm distal right ureteral stone causing hydronephrosis.  There was another smaller 2 mm stone in the right kidney.  She has since passed the stone and is pain-free today.  He does have a long history of stones has previous lithotripsy.    Past Medical History:   Diagnosis Date    Allergy     Anxiety     Asthma     due to allergies     GERD (gastroesophageal reflux disease)     History of nephrolithiasis     Irritable bowel syndrome     Kidney stone     lithotripsy 20 years ago    Peptic ulcer disease     PONV (postoperative nausea and vomiting)     Colin syndrome     Vitamin D deficiency      Past Surgical History:   Procedure Laterality Date     SECTION      times 2    COLONOSCOPY N/A 2016    Procedure: COLONOSCOPY;  Surgeon: Sherman Arevalo MD;  Location: [facility];  Service: Endoscopy;  Laterality: N/A; repeat in 10 years. clear.  internal/external hemorrhoids only.    ESOPHAGOGASTRODUODENOSCOPY  2016    Dr. Arevalo: Medium-sized hiatus hernia. a few gastric polyps biopsied; biopsy: Fundic gland polyps, no dysplasia    ESOPHAGOGASTRODUODENOSCOPY N/A 2021    Procedure: EGD (ESOPHAGOGASTRODUODENOSCOPY);  Surgeon: Sherman Arevalo MD;  Location: [facility];  Service: Endoscopy;  Laterality: N/A;    HYSTEROSCOPY      TUBAL LIGATION      UPPER GASTROINTESTINAL ENDOSCOPY  10/29/2010    Dr. Arevalo: mil schatzki ring- dilated, gastric polyp removed, otherwise normal findings. repeat PRN    WISDOM TOOTH EXTRACTION         Current Outpatient Medications:     calcium carbonate (TUMS) 200 mg calcium (500 mg) chewable tablet, Take 1 tablet by mouth  daily as needed for Heartburn., Disp: , Rfl:     chlorhexidine (PERIDEX) 0.12 % solution, once daily., Disp: , Rfl:     cholecalciferol, vitamin D3, (VITAMIN D3) 25 mcg (1,000 unit) capsule, 2,000 Units., Disp: , Rfl:     dicyclomine (BENTYL) 10 MG capsule, 20 mg., Disp: , Rfl:     ergocalciferol (ERGOCALCIFEROL) 50,000 unit Cap, Take 1 capsule (50,000 Units total) by mouth every 7 days., Disp: 12 capsule, Rfl: 3    esomeprazole (NEXIUM) 40 MG capsule, Take 40 mg by mouth once daily., Disp: , Rfl:     HYDROcodone-acetaminophen (NORCO) 5-325 mg per tablet, Take 1 tablet by mouth 4 (four) times daily as needed., Disp: , Rfl:     liothyronine (CYTOMEL) 5 MCG Tab, ONE TIME DOSE, Disp: , Rfl:     miSOPROStoL (CYTOTEC) 100 MCG Tab, ONE TIME DOSE, Disp: , Rfl:     ondansetron (ZOFRAN-ODT) 4 MG TbDL, Take 4 mg by mouth every 6 (six) hours as needed., Disp: , Rfl:     pantoprazole (PROTONIX) 40 MG tablet, Take 1 tablet (40 mg total) by mouth once daily., Disp: 30 tablet, Rfl: 11    tamsulosin (FLOMAX) 0.4 mg Cap, Take 1 capsule by mouth once daily., Disp: , Rfl:     VENTOLIN HFA 90 mcg/actuation inhaler, INHALE 2 PUFFS BY MOUTH INTO THE LUNGS EVERY 6 HOURS AS NEEDED FOR WHEEZING, Disp: 18 g, Rfl: 2    clonazePAM (KLONOPIN) 0.5 MG tablet, Take 1 tablet (0.5 mg total) by mouth nightly as needed for Anxiety., Disp: 20 tablet, Rfl: 0      Review of Systems   Constitutional: Negative for fever.   Eyes: Negative for visual disturbance.   Respiratory: Negative for shortness of breath.    Cardiovascular: Negative for chest pain.   Gastrointestinal: Positive for nausea.   Genitourinary: Positive for flank pain and hematuria. Negative for dysuria.   Musculoskeletal: Negative for gait problem.   Skin: Negative for rash.   Neurological: Negative for seizures.   Psychiatric/Behavioral: Negative for confusion.       Objective:      Physical Exam  Vitals reviewed.   Constitutional:       Appearance: She is well-developed.    HENT:      Head: Normocephalic.   Eyes:      Conjunctiva/sclera: Conjunctivae normal.   Cardiovascular:      Rate and Rhythm: Normal rate.   Pulmonary:      Effort: Pulmonary effort is normal.   Abdominal:      General: There is no distension.      Palpations: Abdomen is soft. There is no mass.      Tenderness: There is no abdominal tenderness. There is no right CVA tenderness or left CVA tenderness.   Genitourinary:     Comments: Bladder non-tender and nondistended  No CVA tenderness  Musculoskeletal:      Cervical back: Normal range of motion.      Right lower leg: No edema.      Left lower leg: No edema.   Skin:     General: Skin is warm and dry.      Findings: No erythema or rash.   Neurological:      Mental Status: She is alert and oriented to person, place, and time.   Psychiatric:         Behavior: Behavior normal.         Assessment:       1. Kidney stone        Plan:       Kidney stone  -     POCT URINE DIPSTICK WITHOUT MICROSCOPE  -     URINARY STONE ANALYSIS      Recommendations:  Increase hydration 2 liters fluid per day.      Low Oxalate diet     Add Citrate (lemon juice daily)

## 2022-03-17 LAB
COMPN STONE: NORMAL
SPECIMEN SOURCE: NORMAL
STONE ANALYSIS IR-IMP: NORMAL

## 2022-03-21 ENCOUNTER — PATIENT MESSAGE (OUTPATIENT)
Dept: UROLOGY | Facility: CLINIC | Age: 57
End: 2022-03-21
Payer: COMMERCIAL

## 2022-05-13 ENCOUNTER — TELEPHONE (OUTPATIENT)
Dept: FAMILY MEDICINE | Facility: CLINIC | Age: 57
End: 2022-05-13
Payer: COMMERCIAL

## 2022-05-13 NOTE — TELEPHONE ENCOUNTER
Called pt and left her a voicemail to please call us back as we need to reschedule her appt with Gerda today.

## 2022-05-13 NOTE — TELEPHONE ENCOUNTER
----- Message from Ryan Peña sent at 5/13/2022  2:36 PM CDT -----  Type:  Patient Returning Call    Who Called:Pt     Who Left Message for Patient:April     Does the patient know what this is regarding?:yes reschedule her appt     Would the patient rather a call back or a response via [facility]? Call back     Best Call Back Number:[phone] (mobile)     Additional Information:

## 2022-05-27 ENCOUNTER — OFFICE VISIT (OUTPATIENT)
Dept: FAMILY MEDICINE | Facility: CLINIC | Age: 57
End: 2022-05-27
Payer: COMMERCIAL

## 2022-05-27 VITALS
RESPIRATION RATE: 18 BRPM | OXYGEN SATURATION: 99 % | DIASTOLIC BLOOD PRESSURE: 72 MMHG | BODY MASS INDEX: 29.55 KG/M2 | SYSTOLIC BLOOD PRESSURE: 118 MMHG | HEART RATE: 93 BPM | TEMPERATURE: 98 F | HEIGHT: 64 IN | WEIGHT: 173.06 LBS

## 2022-05-27 DIAGNOSIS — J01.00 ACUTE MAXILLARY SINUSITIS, RECURRENCE NOT SPECIFIED: Primary | ICD-10-CM

## 2022-05-27 DIAGNOSIS — N20.0 RENAL CALCULI: ICD-10-CM

## 2022-05-27 PROCEDURE — 1160F RVW MEDS BY RX/DR IN RCRD: CPT | Mod: CPTII,S$GLB,, | Performed by: NURSE PRACTITIONER

## 2022-05-27 PROCEDURE — 1160F PR REVIEW ALL MEDS BY PRESCRIBER/CLIN PHARMACIST DOCUMENTED: ICD-10-PCS | Mod: CPTII,S$GLB,, | Performed by: NURSE PRACTITIONER

## 2022-05-27 PROCEDURE — 3008F BODY MASS INDEX DOCD: CPT | Mod: CPTII,S$GLB,, | Performed by: NURSE PRACTITIONER

## 2022-05-27 PROCEDURE — 3078F DIAST BP <80 MM HG: CPT | Mod: CPTII,S$GLB,, | Performed by: NURSE PRACTITIONER

## 2022-05-27 PROCEDURE — 3074F PR MOST RECENT SYSTOLIC BLOOD PRESSURE < 130 MM HG: ICD-10-PCS | Mod: CPTII,S$GLB,, | Performed by: NURSE PRACTITIONER

## 2022-05-27 PROCEDURE — 99214 PR OFFICE/OUTPT VISIT, EST, LEVL IV, 30-39 MIN: ICD-10-PCS | Mod: S$GLB,,, | Performed by: NURSE PRACTITIONER

## 2022-05-27 PROCEDURE — 3074F SYST BP LT 130 MM HG: CPT | Mod: CPTII,S$GLB,, | Performed by: NURSE PRACTITIONER

## 2022-05-27 PROCEDURE — 3008F PR BODY MASS INDEX (BMI) DOCUMENTED: ICD-10-PCS | Mod: CPTII,S$GLB,, | Performed by: NURSE PRACTITIONER

## 2022-05-27 PROCEDURE — 1159F PR MEDICATION LIST DOCUMENTED IN MEDICAL RECORD: ICD-10-PCS | Mod: CPTII,S$GLB,, | Performed by: NURSE PRACTITIONER

## 2022-05-27 PROCEDURE — 99214 OFFICE O/P EST MOD 30 MIN: CPT | Mod: S$GLB,,, | Performed by: NURSE PRACTITIONER

## 2022-05-27 PROCEDURE — 1159F MED LIST DOCD IN RCRD: CPT | Mod: CPTII,S$GLB,, | Performed by: NURSE PRACTITIONER

## 2022-05-27 PROCEDURE — 3078F PR MOST RECENT DIASTOLIC BLOOD PRESSURE < 80 MM HG: ICD-10-PCS | Mod: CPTII,S$GLB,, | Performed by: NURSE PRACTITIONER

## 2022-05-27 RX ORDER — AZITHROMYCIN 250 MG/1
TABLET, FILM COATED ORAL
Qty: 6 TABLET | Refills: 0 | Status: SHIPPED | OUTPATIENT
Start: 2022-05-27 | End: 2022-06-01

## 2022-05-27 NOTE — PROGRESS NOTES
Subjective:       Patient ID: Lavern Hodges is a 56 y.o. female.    Chief Complaint: Sinus Problem    HPI Onset about 10 days, allergy symptoms worse. States just lingering. Taking OTC allergy sinus medication. Sinus area pain. pressure Behind eyes.  No fever. No loss of taste or smell. No sore throat.     Recently seen at [facility] for kidney stone. She did end up passing the stone a few days later. Has already followed up with Urology. No pain at this time.    See ROS.    The following portion of the patients history was reviewed and updated as appropriate: allergies, current medications, past medical and surgical history. Past social history and problem list reviewed. Family PMH and Past social history reviewed. Tobacco, Illicit drug use reviewed.      Review of patient's allergies indicates:   Allergen Reactions    Cefaclor Hives    Codeine Hives         Current Outpatient Medications:     calcium carbonate (TUMS) 200 mg calcium (500 mg) chewable tablet, Take 1 tablet by mouth daily as needed for Heartburn., Disp: , Rfl:     cholecalciferol, vitamin D3, (VITAMIN D3) 25 mcg (1,000 unit) capsule, 2,000 Units., Disp: , Rfl:     dicyclomine (BENTYL) 10 MG capsule, 20 mg., Disp: , Rfl:     ergocalciferol (ERGOCALCIFEROL) 50,000 unit Cap, Take 1 capsule (50,000 Units total) by mouth every 7 days., Disp: 12 capsule, Rfl: 3    esomeprazole (NEXIUM) 40 MG capsule, Take 1 capsule by mouth once daily, Disp: 30 capsule, Rfl: 4    ondansetron (ZOFRAN-ODT) 4 MG TbDL, Take 4 mg by mouth every 6 (six) hours as needed., Disp: , Rfl:     tamsulosin (FLOMAX) 0.4 mg Cap, Take 1 capsule by mouth once daily., Disp: , Rfl:     VENTOLIN HFA 90 mcg/actuation inhaler, INHALE 2 PUFFS BY MOUTH INTO THE LUNGS EVERY 6 HOURS AS NEEDED FOR WHEEZING, Disp: 18 g, Rfl: 2    chlorhexidine (PERIDEX) 0.12 % solution, once daily., Disp: , Rfl:     clonazePAM (KLONOPIN) 0.5 MG tablet, Take 1 tablet (0.5 mg total) by mouth nightly as  needed for Anxiety., Disp: 20 tablet, Rfl: 0    Past Medical History:   Diagnosis Date    Allergy     Anxiety     Asthma     due to allergies     GERD (gastroesophageal reflux disease)     History of nephrolithiasis     Irritable bowel syndrome     Kidney stone     lithotripsy 20 years ago    Peptic ulcer disease     PONV (postoperative nausea and vomiting)     Colin syndrome     Vitamin D deficiency        Past Surgical History:   Procedure Laterality Date     SECTION      times 2    COLONOSCOPY N/A 2016    Procedure: COLONOSCOPY;  Surgeon: Sherman Arevalo MD;  Location: [facility];  Service: Endoscopy;  Laterality: N/A; repeat in 10 years. clear.  internal/external hemorrhoids only.    ESOPHAGOGASTRODUODENOSCOPY  2016    Dr. Arevalo: Medium-sized hiatus hernia. a few gastric polyps biopsied; biopsy: Fundic gland polyps, no dysplasia    ESOPHAGOGASTRODUODENOSCOPY N/A 2021    Procedure: EGD (ESOPHAGOGASTRODUODENOSCOPY);  Surgeon: Sherman Arevalo MD;  Location: [facility];  Service: Endoscopy;  Laterality: N/A;    HYSTEROSCOPY      TUBAL LIGATION      UPPER GASTROINTESTINAL ENDOSCOPY  10/29/2010    Dr. Arevalo: mil schatzki ring- dilated, gastric polyp removed, otherwise normal findings. repeat PRN    WISDOM TOOTH EXTRACTION         Social History     Socioeconomic History    Marital status:     Number of children: 2   Occupational History     Employer: [employer]   Tobacco Use    Smoking status: Former Smoker     Types: Cigarettes     Quit date: 8/3/1994     Years since quittin.8    Smokeless tobacco: Never Used   Substance and Sexual Activity    Alcohol use: No    Drug use: No    Sexual activity: Yes     Partners: Male     Birth control/protection: None, See Surgical Hx     Review of Systems   Constitutional: Negative for fatigue and fever.   HENT: Positive for congestion, postnasal drip, rhinorrhea, sinus pain and sneezing. Negative for  "sore throat and voice change.         No loss of taste or smell   Eyes: Negative for visual disturbance.   Respiratory: Negative for cough, chest tightness, shortness of breath and wheezing.    Cardiovascular: Negative for chest pain, palpitations and leg swelling.   Gastrointestinal: Negative for abdominal pain, diarrhea, nausea and vomiting.   Musculoskeletal: Negative for arthralgias, back pain and gait problem.   Skin: Negative for rash and wound.   Neurological: Positive for headaches (sinus pressure). Negative for dizziness and weakness.   Hematological: Negative for adenopathy. Does not bruise/bleed easily.   Psychiatric/Behavioral: Negative for decreased concentration, dysphoric mood and sleep disturbance. The patient is not nervous/anxious.        Objective:      /72   Pulse 93   Temp 98.1 °F (36.7 °C) (Temporal)   Resp 18   Ht 5' 4" (1.626 m)   Wt 78.5 kg (173 lb 1 oz)   SpO2 99%   BMI 29.71 kg/m²      Physical Exam  Constitutional:       General: She is not in acute distress.     Appearance: Normal appearance. She is well-developed and normal weight.   HENT:      Head: Normocephalic.      Right Ear: Tympanic membrane, ear canal and external ear normal.      Left Ear: Tympanic membrane, ear canal and external ear normal.      Nose: Congestion and rhinorrhea present.      Right Sinus: Maxillary sinus tenderness present.      Left Sinus: Maxillary sinus tenderness present.      Mouth/Throat:      Mouth: Mucous membranes are moist.      Pharynx: Oropharynx is clear. Posterior oropharyngeal erythema present.   Eyes:      Conjunctiva/sclera: Conjunctivae normal.      Pupils: Pupils are equal, round, and reactive to light.   Neck:      Thyroid: No thyromegaly.      Vascular: No carotid bruit.      Trachea: Trachea normal. No tracheal tenderness or tracheal deviation.   Cardiovascular:      Rate and Rhythm: Normal rate and regular rhythm.      Pulses: Normal pulses.           Carotid pulses are 2+ on " the right side and 2+ on the left side.       Radial pulses are 2+ on the right side and 2+ on the left side.      Heart sounds: Normal heart sounds. No murmur heard.    No gallop.   Pulmonary:      Effort: Pulmonary effort is normal. No respiratory distress.      Breath sounds: Normal breath sounds. No stridor. No wheezing, rhonchi or rales.   Musculoskeletal:         General: Normal range of motion.      Cervical back: Full passive range of motion without pain, normal range of motion and neck supple. No edema.      Right lower leg: No edema.      Left lower leg: No edema.      Comments: Gait and coordination normal.  strong, equal. Upper and lower extremity strength normal.    Skin:     General: Skin is warm and dry.      Capillary Refill: Capillary refill takes less than 2 seconds.   Neurological:      General: No focal deficit present.      Mental Status: She is alert and oriented to person, place, and time.   Psychiatric:         Attention and Perception: Attention and perception normal.         Mood and Affect: Mood and affect normal.         Speech: Speech normal.         Behavior: Behavior normal.         Assessment:       1. Acute maxillary sinusitis, recurrence not specified    2. Renal calculi        Plan:       Acute maxillary sinusitis, recurrence not specified: Due to duration and presenting exam will cover with antibiotics. Take as directed. Complete full course of medication.  -     azithromycin (Z-ANJEL) 250 MG tablet; Take 2 tablets by mouth on day 1; Take 1 tablet by mouth on days 2-5  Dispense: 6 tablet; Refill: 0    Renal calculi: resolved.        Continue current medication  Take medications only as prescribed  Healthy diet  Adequate rest  Adequate hydration  Avoid allergens  Avoid excessive caffeine     follow up if not improving

## 2022-10-24 RX ORDER — ESOMEPRAZOLE MAGNESIUM 40 MG/1
40 CAPSULE, DELAYED RELEASE ORAL DAILY
Qty: 30 CAPSULE | Refills: 4 | Status: CANCELLED | OUTPATIENT
Start: 2022-10-24

## 2022-10-28 ENCOUNTER — CLINICAL SUPPORT (OUTPATIENT)
Dept: FAMILY MEDICINE | Facility: CLINIC | Age: 57
End: 2022-10-28
Payer: COMMERCIAL

## 2022-10-28 PROCEDURE — 90686 FLU VACCINE (QUAD) GREATER THAN OR EQUAL TO 3YO PRESERVATIVE FREE IM: ICD-10-PCS | Mod: S$GLB,,, | Performed by: NURSE PRACTITIONER

## 2022-10-28 PROCEDURE — 90471 IMMUNIZATION ADMIN: CPT | Mod: S$GLB,,, | Performed by: NURSE PRACTITIONER

## 2022-10-28 PROCEDURE — 90471 FLU VACCINE (QUAD) GREATER THAN OR EQUAL TO 3YO PRESERVATIVE FREE IM: ICD-10-PCS | Mod: S$GLB,,, | Performed by: NURSE PRACTITIONER

## 2022-10-28 PROCEDURE — 90686 IIV4 VACC NO PRSV 0.5 ML IM: CPT | Mod: S$GLB,,, | Performed by: NURSE PRACTITIONER

## 2022-11-11 ENCOUNTER — OFFICE VISIT (OUTPATIENT)
Dept: FAMILY MEDICINE | Facility: CLINIC | Age: 57
End: 2022-11-11
Payer: COMMERCIAL

## 2022-11-11 VITALS
OXYGEN SATURATION: 97 % | BODY MASS INDEX: 29.32 KG/M2 | DIASTOLIC BLOOD PRESSURE: 68 MMHG | HEART RATE: 97 BPM | RESPIRATION RATE: 20 BRPM | SYSTOLIC BLOOD PRESSURE: 121 MMHG | HEIGHT: 64 IN | WEIGHT: 171.75 LBS | TEMPERATURE: 98 F

## 2022-11-11 DIAGNOSIS — M79.671 RIGHT FOOT PAIN: Primary | ICD-10-CM

## 2022-11-11 DIAGNOSIS — M72.2 PLANTAR FASCIITIS OF RIGHT FOOT: ICD-10-CM

## 2022-11-11 PROCEDURE — 3074F SYST BP LT 130 MM HG: CPT | Mod: CPTII,S$GLB,, | Performed by: NURSE PRACTITIONER

## 2022-11-11 PROCEDURE — 3078F PR MOST RECENT DIASTOLIC BLOOD PRESSURE < 80 MM HG: ICD-10-PCS | Mod: CPTII,S$GLB,, | Performed by: NURSE PRACTITIONER

## 2022-11-11 PROCEDURE — 3008F PR BODY MASS INDEX (BMI) DOCUMENTED: ICD-10-PCS | Mod: CPTII,S$GLB,, | Performed by: NURSE PRACTITIONER

## 2022-11-11 PROCEDURE — 99213 OFFICE O/P EST LOW 20 MIN: CPT | Mod: S$GLB,,, | Performed by: NURSE PRACTITIONER

## 2022-11-11 PROCEDURE — 99213 PR OFFICE/OUTPT VISIT, EST, LEVL III, 20-29 MIN: ICD-10-PCS | Mod: S$GLB,,, | Performed by: NURSE PRACTITIONER

## 2022-11-11 PROCEDURE — 3078F DIAST BP <80 MM HG: CPT | Mod: CPTII,S$GLB,, | Performed by: NURSE PRACTITIONER

## 2022-11-11 PROCEDURE — 3008F BODY MASS INDEX DOCD: CPT | Mod: CPTII,S$GLB,, | Performed by: NURSE PRACTITIONER

## 2022-11-11 PROCEDURE — 3074F PR MOST RECENT SYSTOLIC BLOOD PRESSURE < 130 MM HG: ICD-10-PCS | Mod: CPTII,S$GLB,, | Performed by: NURSE PRACTITIONER

## 2022-11-11 NOTE — PROGRESS NOTES
Subjective:       Patient ID: Lavern Hodges is a 57 y.o. female.    Chief Complaint: Foot Pain    Foot Pain  Pertinent negatives include no abdominal pain, arthralgias, chest pain, congestion, coughing, fatigue, fever, headaches, nausea, rash or vomiting.     Onset over the past month with right foot pain. Tried a shoe insert that has not helped. Heel through into arch. She has seen podiatry in the past. States if she walks a lot then it hurts constantly when walking. No redness, warmth. See ROS.    The following portion of the patients history was reviewed and updated as appropriate: allergies, current medications, past medical and surgical history. Past social history and problem list reviewed. Family PMH and Past social history reviewed. Tobacco, Illicit drug use reviewed.      Review of patient's allergies indicates:   Allergen Reactions    Cefaclor Hives    Codeine Hives         Current Outpatient Medications:     calcium carbonate (TUMS) 200 mg calcium (500 mg) chewable tablet, Take 1 tablet by mouth daily as needed for Heartburn., Disp: , Rfl:     chlorhexidine (PERIDEX) 0.12 % solution, once daily., Disp: , Rfl:     dicyclomine (BENTYL) 10 MG capsule, 20 mg., Disp: , Rfl:     esomeprazole (NEXIUM) 40 MG capsule, Take 1 capsule by mouth once daily, Disp: 90 capsule, Rfl: 1    VENTOLIN HFA 90 mcg/actuation inhaler, INHALE 2 PUFFS BY MOUTH INTO THE LUNGS EVERY 6 HOURS AS NEEDED FOR WHEEZING, Disp: 18 g, Rfl: 2    cholecalciferol, vitamin D3, (VITAMIN D3) 25 mcg (1,000 unit) capsule, 2,000 Units., Disp: , Rfl:     clonazePAM (KLONOPIN) 0.5 MG tablet, Take 1 tablet (0.5 mg total) by mouth nightly as needed for Anxiety. (Patient not taking: Reported on 11/11/2022), Disp: 20 tablet, Rfl: 0    ergocalciferol (ERGOCALCIFEROL) 50,000 unit Cap, Take 1 capsule (50,000 Units total) by mouth every 7 days. (Patient not taking: Reported on 11/11/2022), Disp: 12 capsule, Rfl: 3    ondansetron (ZOFRAN-ODT) 4 MG TbDL, Take 4 mg  by mouth every 6 (six) hours as needed., Disp: , Rfl:     tamsulosin (FLOMAX) 0.4 mg Cap, Take 1 capsule by mouth once daily., Disp: , Rfl:     Past Medical History:   Diagnosis Date    Allergy     Anxiety     Asthma     due to allergies     GERD (gastroesophageal reflux disease)     History of nephrolithiasis     Irritable bowel syndrome     Kidney stone     lithotripsy 20 years ago    Peptic ulcer disease     PONV (postoperative nausea and vomiting)     Colin syndrome     Vitamin D deficiency        Past Surgical History:   Procedure Laterality Date     SECTION      times 2    COLONOSCOPY N/A 2016    Procedure: COLONOSCOPY;  Surgeon: Sherman Arevalo MD;  Location: [facility] END[facility];  Service: Endoscopy;  Laterality: N/A; repeat in 10 years. clear.  internal/external hemorrhoids only.    ESOPHAGOGASTRODUODENOSCOPY  2016    Dr. Arevalo: Medium-sized hiatus hernia. a few gastric polyps biopsied; biopsy: Fundic gland polyps, no dysplasia    ESOPHAGOGASTRODUODENOSCOPY N/A 2021    Procedure: EGD (ESOPHAGOGASTRODUODENOSCOPY);  Surgeon: Sherman Arevalo MD;  Location: [facility];  Service: Endoscopy;  Laterality: N/A;    HYSTEROSCOPY      TUBAL LIGATION      UPPER GASTROINTESTINAL ENDOSCOPY  10/29/2010    Dr. Arevalo: mil schatzki ring- dilated, gastric polyp removed, otherwise normal findings. repeat PRN    WISDOM TOOTH EXTRACTION         Social History     Socioeconomic History    Marital status:     Number of children: 2   Occupational History     Employer: [employer]   Tobacco Use    Smoking status: Former     Types: Cigarettes     Quit date: 8/3/1994     Years since quittin.2    Smokeless tobacco: Never   Substance and Sexual Activity    Alcohol use: No    Drug use: No    Sexual activity: Yes     Partners: Male     Birth control/protection: None, See Surgical Hx     Review of Systems   Constitutional:  Negative for fatigue and fever.   HENT:  Negative for congestion,  "postnasal drip, rhinorrhea and voice change.    Eyes:  Negative for visual disturbance.   Respiratory:  Negative for cough, chest tightness, shortness of breath and wheezing.    Cardiovascular:  Negative for chest pain, palpitations and leg swelling.   Gastrointestinal:  Negative for abdominal pain, diarrhea, nausea and vomiting.   Musculoskeletal:  Negative for arthralgias, back pain and gait problem.        Right foot pain, see HPI   Skin:  Negative for rash and wound.   Neurological:  Negative for headaches.   Hematological:  Negative for adenopathy. Does not bruise/bleed easily.   Psychiatric/Behavioral:  Negative for dysphoric mood and sleep disturbance. The patient is not nervous/anxious.      Objective:      /68   Pulse 97   Temp 98.2 °F (36.8 °C)   Resp 20   Ht 5' 4" (1.626 m)   Wt 77.9 kg (171 lb 11.8 oz)   SpO2 97%   BMI 29.48 kg/m²      Physical Exam  Vitals reviewed.   Constitutional:       General: She is not in acute distress.     Appearance: Normal appearance. She is well-developed. She is obese.   HENT:      Head: Normocephalic.      Mouth/Throat:      Mouth: Mucous membranes are moist.      Pharynx: Oropharynx is clear.   Eyes:      Conjunctiva/sclera: Conjunctivae normal.      Pupils: Pupils are equal, round, and reactive to light.   Neck:      Thyroid: No thyromegaly.      Trachea: Trachea normal. No tracheal tenderness or tracheal deviation.   Cardiovascular:      Rate and Rhythm: Normal rate and regular rhythm.      Pulses: Normal pulses.           Carotid pulses are 2+ on the right side and 2+ on the left side.       Radial pulses are 2+ on the right side and 2+ on the left side.        Dorsalis pedis pulses are 2+ on the right side and 2+ on the left side.        Posterior tibial pulses are 2+ on the right side and 2+ on the left side.      Heart sounds: Normal heart sounds. No murmur heard.    No gallop.   Pulmonary:      Effort: Pulmonary effort is normal. No respiratory " distress.      Breath sounds: Normal breath sounds. No stridor. No wheezing, rhonchi or rales.   Abdominal:      Palpations: There is no splenomegaly.   Musculoskeletal:         General: Normal range of motion.      Cervical back: Full passive range of motion without pain, normal range of motion and neck supple. No edema.      Right lower leg: No edema.      Left lower leg: No edema.      Right foot: Normal range of motion. No bunion.      Left foot: Normal range of motion. No bunion.      Comments: Gait and coordination normal.  strong, equal. Upper and lower extremity strength normal.    Feet:      Right foot:      Protective Sensation:   8 sites sensed.      Skin integrity: No skin breakdown.      Left foot:      Protective Sensation:   8 sites sensed.      Skin integrity: No skin breakdown.   Skin:     General: Skin is warm and dry.      Capillary Refill: Capillary refill takes less than 2 seconds.      Findings: No lesion or rash.   Neurological:      General: No focal deficit present.      Mental Status: She is alert and oriented to person, place, and time.      Gait: Gait normal.   Psychiatric:         Attention and Perception: Attention and perception normal.         Mood and Affect: Mood and affect normal.         Speech: Speech normal.         Behavior: Behavior normal.       Assessment:       1. Right foot pain    2. Plantar fasciitis of right foot        Plan:       Right foot pain: will get xray and refer to podiatry for evaluation and treatment options.  -     X-Ray Foot Complete Right; Future; Expected date: 11/11/2022  -     Ambulatory referral/consult to Podiatry; Future; Expected date: 11/18/2022    Plantar fasciitis of right foot: presents as plantar fasciitis. Discussed exercises. Stretching, good arch support, etc.      Continue current medication  Take medications only as prescribed  Healthy diet, exercise  Adequate rest  Adequate hydration  Avoid allergens  Avoid excessive caffeine       Follow up as scheduled.

## 2022-11-14 ENCOUNTER — PATIENT MESSAGE (OUTPATIENT)
Dept: FAMILY MEDICINE | Facility: CLINIC | Age: 57
End: 2022-11-14
Payer: COMMERCIAL

## 2022-11-14 ENCOUNTER — HOSPITAL ENCOUNTER (OUTPATIENT)
Dept: RADIOLOGY | Facility: HOSPITAL | Age: 57
Discharge: HOME | End: 2022-11-14
Attending: NURSE PRACTITIONER
Payer: COMMERCIAL

## 2022-11-14 DIAGNOSIS — M79.671 RIGHT FOOT PAIN: ICD-10-CM

## 2022-11-14 PROCEDURE — 73630 X-RAY EXAM OF FOOT: CPT | Mod: 26,RT,, | Performed by: RADIOLOGY

## 2022-11-14 PROCEDURE — 73630 XR FOOT COMPLETE 3 VIEW RIGHT: ICD-10-PCS | Mod: 26,RT,, | Performed by: RADIOLOGY

## 2022-11-14 PROCEDURE — 73630 X-RAY EXAM OF FOOT: CPT | Mod: TC,PN,RT

## 2022-11-15 ENCOUNTER — TELEPHONE (OUTPATIENT)
Dept: FAMILY MEDICINE | Facility: CLINIC | Age: 57
End: 2022-11-15
Payer: COMMERCIAL

## 2022-11-15 DIAGNOSIS — R39.9 UTI SYMPTOMS: Primary | ICD-10-CM

## 2022-11-15 NOTE — TELEPHONE ENCOUNTER
----- Message from Cristela Perkins sent at 11/15/2022  4:46 PM CST -----  Contact: self  Type: Needs Medical Advice    Who Called:  patient  Symptoms (please be specific):  UTI urine order    Best Call Back Number: [phone]   Additional Information: The pt stated that she might have a UTI. The pt stated that it burns when urinating.The pt stated that she would like to get tested to see if she has a UTI. Please call pt back and advice.

## 2022-11-16 ENCOUNTER — TELEPHONE (OUTPATIENT)
Dept: PODIATRY | Facility: CLINIC | Age: 57
End: 2022-11-16
Payer: COMMERCIAL

## 2022-11-16 ENCOUNTER — TELEPHONE (OUTPATIENT)
Dept: PODIATRY | Facility: CLINIC | Age: 57
End: 2022-11-16

## 2022-11-16 ENCOUNTER — PATIENT MESSAGE (OUTPATIENT)
Dept: FAMILY MEDICINE | Facility: CLINIC | Age: 57
End: 2022-11-16
Payer: COMMERCIAL

## 2022-11-16 ENCOUNTER — OFFICE VISIT (OUTPATIENT)
Dept: PODIATRY | Facility: CLINIC | Age: 57
End: 2022-11-16
Payer: COMMERCIAL

## 2022-11-16 ENCOUNTER — LAB VISIT (OUTPATIENT)
Dept: LAB | Facility: HOSPITAL | Age: 57
End: 2022-11-16
Payer: COMMERCIAL

## 2022-11-16 DIAGNOSIS — R39.9 UTI SYMPTOMS: ICD-10-CM

## 2022-11-16 DIAGNOSIS — M79.671 RIGHT FOOT PAIN: ICD-10-CM

## 2022-11-16 DIAGNOSIS — M72.2 PLANTAR FASCIITIS: Primary | ICD-10-CM

## 2022-11-16 LAB
BACTERIA #/AREA URNS HPF: NORMAL /HPF
BILIRUB UR QL STRIP: NEGATIVE
CLARITY UR: CLEAR
COLOR UR: YELLOW
GLUCOSE UR QL STRIP: NEGATIVE
HGB UR QL STRIP: NEGATIVE
KETONES UR QL STRIP: NEGATIVE
LEUKOCYTE ESTERASE UR QL STRIP: ABNORMAL
MICROSCOPIC COMMENT: NORMAL
NITRITE UR QL STRIP: NEGATIVE
PH UR STRIP: 6 [PH] (ref 5–8)
PROT UR QL STRIP: NEGATIVE
SP GR UR STRIP: 1.01 (ref 1–1.03)
SQUAMOUS #/AREA URNS HPF: 1 /HPF
URN SPEC COLLECT METH UR: ABNORMAL
WBC #/AREA URNS HPF: 1 /HPF (ref 0–5)

## 2022-11-16 PROCEDURE — 1160F PR REVIEW ALL MEDS BY PRESCRIBER/CLIN PHARMACIST DOCUMENTED: ICD-10-PCS | Mod: CPTII,S$GLB,, | Performed by: STUDENT IN AN ORGANIZED HEALTH CARE EDUCATION/TRAINING PROGRAM

## 2022-11-16 PROCEDURE — 99999 PR PBB SHADOW E&M-EST. PATIENT-LVL III: ICD-10-PCS | Mod: PBBFAC,,, | Performed by: STUDENT IN AN ORGANIZED HEALTH CARE EDUCATION/TRAINING PROGRAM

## 2022-11-16 PROCEDURE — 81000 URINALYSIS NONAUTO W/SCOPE: CPT | Mod: PO | Performed by: NURSE PRACTITIONER

## 2022-11-16 PROCEDURE — 1159F MED LIST DOCD IN RCRD: CPT | Mod: CPTII,S$GLB,, | Performed by: STUDENT IN AN ORGANIZED HEALTH CARE EDUCATION/TRAINING PROGRAM

## 2022-11-16 PROCEDURE — 99203 PR OFFICE/OUTPT VISIT, NEW, LEVL III, 30-44 MIN: ICD-10-PCS | Mod: 25,S$GLB,, | Performed by: STUDENT IN AN ORGANIZED HEALTH CARE EDUCATION/TRAINING PROGRAM

## 2022-11-16 PROCEDURE — 1160F RVW MEDS BY RX/DR IN RCRD: CPT | Mod: CPTII,S$GLB,, | Performed by: STUDENT IN AN ORGANIZED HEALTH CARE EDUCATION/TRAINING PROGRAM

## 2022-11-16 PROCEDURE — 99999 PR PBB SHADOW E&M-EST. PATIENT-LVL III: CPT | Mod: PBBFAC,,, | Performed by: STUDENT IN AN ORGANIZED HEALTH CARE EDUCATION/TRAINING PROGRAM

## 2022-11-16 PROCEDURE — 1159F PR MEDICATION LIST DOCUMENTED IN MEDICAL RECORD: ICD-10-PCS | Mod: CPTII,S$GLB,, | Performed by: STUDENT IN AN ORGANIZED HEALTH CARE EDUCATION/TRAINING PROGRAM

## 2022-11-16 PROCEDURE — 99203 OFFICE O/P NEW LOW 30 MIN: CPT | Mod: 25,S$GLB,, | Performed by: STUDENT IN AN ORGANIZED HEALTH CARE EDUCATION/TRAINING PROGRAM

## 2022-11-16 RX ORDER — NITROFURANTOIN 25; 75 MG/1; MG/1
100 CAPSULE ORAL 2 TIMES DAILY
Qty: 14 CAPSULE | Refills: 0 | Status: SHIPPED | OUTPATIENT
Start: 2022-11-16 | End: 2022-11-30 | Stop reason: ALTCHOICE

## 2022-11-16 RX ORDER — TRIAMCINOLONE ACETONIDE 40 MG/ML
40 INJECTION, SUSPENSION INTRA-ARTICULAR; INTRAMUSCULAR
Status: DISCONTINUED | OUTPATIENT
Start: 2022-11-16 | End: 2023-08-24

## 2022-11-16 NOTE — TELEPHONE ENCOUNTER
Pt has appt at [facility] today.  Lab appt made at [facility] to drop off urine per pts request.

## 2022-11-16 NOTE — TELEPHONE ENCOUNTER
Spoke with pt and told her that Dr. Negro stated that the numbness will subside. Informed pt that if numbness is still present tomorrow to send an message in [employer] or call and leave a message for Dr. Negro. Pt acknowledged understanding.

## 2022-11-16 NOTE — PROGRESS NOTES
Chief Complaint   Patient presents with    Foot Pain         MEDICAL DECISION MAKING         1. Plantar fasciitis    2. Right foot pain  -     Ambulatory referral/consult to Podiatry    Other orders  -     triamcinolone acetonide injection 40 mg         I counseled the patient on the patient's conditions, their implications and medical management.     Plantar Fasciitis:  -Patient was given a printout of stretching exercises to stretch the achilles tendon and increase ankle dorsiflexion  -Patient instructed to ice with a frozen water bottle as instructed in the handout.  -Patient instructed to refrain from barefoot walking. I recommend Hoka slippers.  -Shoe gear was discussed with patient and recommended a supportive shoe with a stiff shank that doesn't flex at the arch. The shoe should also have an elevated heel. Some brands include Rogers, Asics and Hokas. The length of the shoe should accommodate the width of a thumb between the big toe and the edge of the shoe.    With patient's verbal consent, the painful area was cleansed with an alcohol swab. The patient was given a steroid injection of 40mg of kenalog and 1 mL of marcaine to the plantar aspect of the right foot. Patient tolerated the procedure well without any immediate complications.     Patient to follow up in about 12 weeks.       HPI:       Lavern Hodges is a 57 y.o. female who presents to clinic with concerns of right heel pain for 2 months.     Pain is worse with weight bearing and first few steps after prolonged periods of rest.     Patient denies acute trauma to the affected area.   Treatment tried: none      Patient Active Problem List   Diagnosis    Gastroesophageal reflux disease with esophagitis    Epigastric abdominal pain    Screening for colon cancer    Cervical pain    Chronic midline low back pain without sciatica    Anxiety    Irritable bowel syndrome    Kidney stone    Vitamin D deficiency    GERD (gastroesophageal reflux disease)          Current Outpatient Medications on File Prior to Visit   Medication Sig Dispense Refill    calcium carbonate (TUMS) 200 mg calcium (500 mg) chewable tablet Take 1 tablet by mouth daily as needed for Heartburn.      chlorhexidine (PERIDEX) 0.12 % solution once daily.      cholecalciferol, vitamin D3, (VITAMIN D3) 25 mcg (1,000 unit) capsule 2,000 Units.      dicyclomine (BENTYL) 10 MG capsule 20 mg.      esomeprazole (NEXIUM) 40 MG capsule Take 1 capsule by mouth once daily 90 capsule 1    VENTOLIN HFA 90 mcg/actuation inhaler INHALE 2 PUFFS BY MOUTH INTO THE LUNGS EVERY 6 HOURS AS NEEDED FOR WHEEZING 18 g 2    clonazePAM (KLONOPIN) 0.5 MG tablet Take 1 tablet (0.5 mg total) by mouth nightly as needed for Anxiety. (Patient not taking: Reported on 11/11/2022) 20 tablet 0    ergocalciferol (ERGOCALCIFEROL) 50,000 unit Cap Take 1 capsule (50,000 Units total) by mouth every 7 days. (Patient not taking: Reported on 11/11/2022) 12 capsule 3    ondansetron (ZOFRAN-ODT) 4 MG TbDL Take 4 mg by mouth every 6 (six) hours as needed.      tamsulosin (FLOMAX) 0.4 mg Cap Take 1 capsule by mouth once daily.       No current facility-administered medications on file prior to visit.           Review of patient's allergies indicates:   Allergen Reactions    Cefaclor Hives    Codeine Hives         ROS:  General ROS: negative for  chills, fatigue or fever  Cardiovascular ROS: no chest pain or dyspnea on exertion  Musculoskeletal ROS: negative for joint pain or joint stiffness.  Negative for loss of strength.  Positive for foot pain.   Neuro ROS: Negative for syncope, numbness, or muscle weakness  Skin ROS: Negative for rash, itching or nail/hair changes.           OBJECTIVE:         There were no vitals filed for this visit.     Right Lower extremity exam:  Vasc:   Palpable pedal pulses.   Feet appropriately warm to touch.   Cap refill time is within normal limits   Edema: none    Neurological:    Light touch, proprioception, and  Sharp/dull sensation are all intact.   There is no Tinel's along the tarsal tunnel.      Derm:   No open lesions, macerations, or rashes  Bruising:  absent  Redness:  absent  Pedal hair:  present      MSK:    Palpable pain plantar medial tubercle of the calcaneus right,    tightness to the Achilles tendon with ROM right   There is no pain with the heel squeeze/compression  test.

## 2022-11-16 NOTE — TELEPHONE ENCOUNTER
----- Message from José Antonio May sent at 11/16/2022  3:50 PM CST -----  Contact: pt at [phone]  Type: Needs Medical Advice  Who Called:  pt  Best Call Back Number: [phone]  Additional Information: pt is calling the office to let them know her whole foot is numb and if that was normal after her oprocedure she had done today. Please call back and advise.

## 2022-11-23 ENCOUNTER — TELEPHONE (OUTPATIENT)
Dept: FAMILY MEDICINE | Facility: CLINIC | Age: 57
End: 2022-11-23
Payer: COMMERCIAL

## 2022-11-23 NOTE — TELEPHONE ENCOUNTER
Patient is currently being treated with Macrobid for uti and symptoms have improved with only slight burning today.  Reports that she is having pain and inflammation from a root canal that she had a month ago.  She will not be able to see her dentist until next week.  She advised that if pain does not resolve that she will need to start amoxil after completion of macrobid.  Patient was trying to see if amoxil could also treat remaining uti symptoms.

## 2022-11-23 NOTE — TELEPHONE ENCOUNTER
----- Message from Isabella Cherry, Patient Care Assistant sent at 11/22/2022 12:52 PM CST -----  Regarding: advice  Contact: pt  Type: Needs Medical Advice  Who Called:  pt   Symptoms (please be specific): burning while urinating   Best Call Back Number: [phone] (home)     Additional Information: please call pt to advise. Thanks!

## 2022-11-25 RX ORDER — AMOXICILLIN AND CLAVULANATE POTASSIUM 875; 125 MG/1; MG/1
1 TABLET, FILM COATED ORAL 2 TIMES DAILY
Qty: 20 TABLET | Refills: 0 | Status: SHIPPED | OUTPATIENT
Start: 2022-11-25 | End: 2022-12-21 | Stop reason: ALTCHOICE

## 2022-11-25 NOTE — TELEPHONE ENCOUNTER
Sorry for the delay in response. Out for the Holiday.   I sent Augmentin to the pharmacy for her. It can treat UTI as well as her infected tooth. Let me know if not improving. Hydrate well and take with food.

## 2022-11-30 ENCOUNTER — OFFICE VISIT (OUTPATIENT)
Dept: FAMILY MEDICINE | Facility: CLINIC | Age: 57
End: 2022-11-30
Payer: COMMERCIAL

## 2022-11-30 VITALS
HEIGHT: 64 IN | RESPIRATION RATE: 18 BRPM | WEIGHT: 170.63 LBS | SYSTOLIC BLOOD PRESSURE: 122 MMHG | TEMPERATURE: 98 F | DIASTOLIC BLOOD PRESSURE: 79 MMHG | BODY MASS INDEX: 29.13 KG/M2 | HEART RATE: 83 BPM | OXYGEN SATURATION: 98 %

## 2022-11-30 DIAGNOSIS — K44.9 HIATAL HERNIA: ICD-10-CM

## 2022-11-30 DIAGNOSIS — R10.9 FLANK PAIN: ICD-10-CM

## 2022-11-30 DIAGNOSIS — R31.9 HEMATURIA, UNSPECIFIED TYPE: ICD-10-CM

## 2022-11-30 DIAGNOSIS — R10.11 RIGHT UPPER QUADRANT ABDOMINAL PAIN: ICD-10-CM

## 2022-11-30 DIAGNOSIS — R30.0 DYSURIA: Primary | ICD-10-CM

## 2022-11-30 DIAGNOSIS — Z87.442 HISTORY OF KIDNEY STONES: ICD-10-CM

## 2022-11-30 DIAGNOSIS — Z00.00 LABORATORY EXAM ORDERED AS PART OF ROUTINE GENERAL MEDICAL EXAMINATION: ICD-10-CM

## 2022-11-30 LAB
BILIRUB SERPL-MCNC: ABNORMAL MG/DL
BLOOD URINE, POC: ABNORMAL
CLARITY, POC UA: CLEAR
COLOR, POC UA: YELLOW
GLUCOSE UR QL STRIP: ABNORMAL
KETONES UR QL STRIP: ABNORMAL
LEUKOCYTE ESTERASE URINE, POC: ABNORMAL
NITRITE, POC UA: ABNORMAL
PH, POC UA: 7
PROTEIN, POC: ABNORMAL
SPECIFIC GRAVITY, POC UA: 1.01
UROBILINOGEN, POC UA: NORMAL

## 2022-11-30 PROCEDURE — 81002 URINALYSIS NONAUTO W/O SCOPE: CPT | Mod: S$GLB,,, | Performed by: NURSE PRACTITIONER

## 2022-11-30 PROCEDURE — 3078F DIAST BP <80 MM HG: CPT | Mod: CPTII,S$GLB,, | Performed by: NURSE PRACTITIONER

## 2022-11-30 PROCEDURE — 3008F PR BODY MASS INDEX (BMI) DOCUMENTED: ICD-10-PCS | Mod: CPTII,S$GLB,, | Performed by: NURSE PRACTITIONER

## 2022-11-30 PROCEDURE — 3078F PR MOST RECENT DIASTOLIC BLOOD PRESSURE < 80 MM HG: ICD-10-PCS | Mod: CPTII,S$GLB,, | Performed by: NURSE PRACTITIONER

## 2022-11-30 PROCEDURE — 1159F MED LIST DOCD IN RCRD: CPT | Mod: CPTII,S$GLB,, | Performed by: NURSE PRACTITIONER

## 2022-11-30 PROCEDURE — 3074F SYST BP LT 130 MM HG: CPT | Mod: CPTII,S$GLB,, | Performed by: NURSE PRACTITIONER

## 2022-11-30 PROCEDURE — 99214 PR OFFICE/OUTPT VISIT, EST, LEVL IV, 30-39 MIN: ICD-10-PCS | Mod: S$GLB,,, | Performed by: NURSE PRACTITIONER

## 2022-11-30 PROCEDURE — 99214 OFFICE O/P EST MOD 30 MIN: CPT | Mod: S$GLB,,, | Performed by: NURSE PRACTITIONER

## 2022-11-30 PROCEDURE — 3008F BODY MASS INDEX DOCD: CPT | Mod: CPTII,S$GLB,, | Performed by: NURSE PRACTITIONER

## 2022-11-30 PROCEDURE — 81002 POCT URINE DIPSTICK WITHOUT MICROSCOPE: ICD-10-PCS | Mod: S$GLB,,, | Performed by: NURSE PRACTITIONER

## 2022-11-30 PROCEDURE — 1160F PR REVIEW ALL MEDS BY PRESCRIBER/CLIN PHARMACIST DOCUMENTED: ICD-10-PCS | Mod: CPTII,S$GLB,, | Performed by: NURSE PRACTITIONER

## 2022-11-30 PROCEDURE — 1160F RVW MEDS BY RX/DR IN RCRD: CPT | Mod: CPTII,S$GLB,, | Performed by: NURSE PRACTITIONER

## 2022-11-30 PROCEDURE — 87086 URINE CULTURE/COLONY COUNT: CPT | Performed by: NURSE PRACTITIONER

## 2022-11-30 PROCEDURE — 1159F PR MEDICATION LIST DOCUMENTED IN MEDICAL RECORD: ICD-10-PCS | Mod: CPTII,S$GLB,, | Performed by: NURSE PRACTITIONER

## 2022-11-30 PROCEDURE — 3074F PR MOST RECENT SYSTOLIC BLOOD PRESSURE < 130 MM HG: ICD-10-PCS | Mod: CPTII,S$GLB,, | Performed by: NURSE PRACTITIONER

## 2022-11-30 RX ORDER — AMOXICILLIN 875 MG/1
875 TABLET, FILM COATED ORAL 2 TIMES DAILY
COMMUNITY
Start: 2022-11-23 | End: 2022-11-30 | Stop reason: SDUPTHER

## 2022-11-30 NOTE — PROGRESS NOTES
Subjective:       Patient ID: Lavern Hodges is a 57 y.o. female.    Chief Complaint: Follow-up (Per pt, still feeling some burning upon urination and some cramping. States felt like it was getting better but feels it may be coming back.)    Follow-up  Associated symptoms include abdominal pain (see HPI). Pertinent negatives include no arthralgias, chest pain, coughing, fatigue, fever, headaches, nausea, rash or vomiting.     Here for follow up on UTI. Completed antibiotics. Felt better but now symptoms seem to be returning. Having some right abdominal, flank discomfort. Cramping type feeling over lower abdomen. No fever. History of kidney stones. See ROS.    The following portion of the patients history was reviewed and updated as appropriate: allergies, current medications, past medical and surgical history. Past social history and problem list reviewed. Family PMH and Past social history reviewed. Tobacco, Illicit drug use reviewed.      Review of patient's allergies indicates:   Allergen Reactions    Cefaclor Hives    Codeine Hives         Current Outpatient Medications:     amoxicillin-clavulanate 875-125mg (AUGMENTIN) 875-125 mg per tablet, Take 1 tablet by mouth 2 (two) times daily. for 10 days, Disp: 20 tablet, Rfl: 0    calcium carbonate (TUMS) 200 mg calcium (500 mg) chewable tablet, Take 1 tablet by mouth daily as needed for Heartburn., Disp: , Rfl:     chlorhexidine (PERIDEX) 0.12 % solution, once daily., Disp: , Rfl:     cholecalciferol, vitamin D3, (VITAMIN D3) 25 mcg (1,000 unit) capsule, 2,000 Units., Disp: , Rfl:     dicyclomine (BENTYL) 10 MG capsule, 20 mg., Disp: , Rfl:     esomeprazole (NEXIUM) 40 MG capsule, Take 1 capsule by mouth once daily, Disp: 90 capsule, Rfl: 1    ondansetron (ZOFRAN-ODT) 4 MG TbDL, Take 4 mg by mouth every 6 (six) hours as needed., Disp: , Rfl:     tamsulosin (FLOMAX) 0.4 mg Cap, Take 1 capsule by mouth once daily., Disp: , Rfl:     VENTOLIN HFA 90 mcg/actuation inhaler,  INHALE 2 PUFFS BY MOUTH INTO THE LUNGS EVERY 6 HOURS AS NEEDED FOR WHEEZING, Disp: 18 g, Rfl: 2    Current Facility-Administered Medications:     triamcinolone acetonide injection 40 mg, 40 mg, Intramuscular, 1 time in Clinic/HOD, Ulysses Negro DPM    Past Medical History:   Diagnosis Date    Allergy     Anxiety     Asthma     due to allergies     GERD (gastroesophageal reflux disease)     History of nephrolithiasis     Irritable bowel syndrome     Kidney stone     lithotripsy 20 years ago    Peptic ulcer disease     PONV (postoperative nausea and vomiting)     Colin syndrome     Vitamin D deficiency        Past Surgical History:   Procedure Laterality Date     SECTION      times 2    COLONOSCOPY N/A 2016    Procedure: COLONOSCOPY;  Surgeon: Sherman Arevalo MD;  Location: [facility] ENDO;  Service: Endoscopy;  Laterality: N/A; repeat in 10 years. clear.  internal/external hemorrhoids only.    ESOPHAGOGASTRODUODENOSCOPY  2016    Dr. Arevalo: Medium-sized hiatus hernia. a few gastric polyps biopsied; biopsy: Fundic gland polyps, no dysplasia    ESOPHAGOGASTRODUODENOSCOPY N/A 2021    Procedure: EGD (ESOPHAGOGASTRODUODENOSCOPY);  Surgeon: Sherman Arevalo MD;  Location: [facility];  Service: Endoscopy;  Laterality: N/A;    HYSTEROSCOPY      TUBAL LIGATION      UPPER GASTROINTESTINAL ENDOSCOPY  10/29/2010    Dr. Arevalo: mil schatzki ring- dilated, gastric polyp removed, otherwise normal findings. repeat PRN    WISDOM TOOTH EXTRACTION         Social History     Socioeconomic History    Marital status:     Number of children: 2   Occupational History     Employer: [employer]   Tobacco Use    Smoking status: Former     Types: Cigarettes     Quit date: 8/3/1994     Years since quittin.3    Smokeless tobacco: Never   Substance and Sexual Activity    Alcohol use: No    Drug use: No    Sexual activity: Yes     Partners: Male     Birth control/protection: None, See Surgical Hx  "    Review of Systems   Constitutional:  Negative for fatigue and fever.   Eyes:  Negative for visual disturbance.   Respiratory:  Negative for cough, chest tightness, shortness of breath and wheezing.    Cardiovascular:  Negative for chest pain, palpitations and leg swelling.   Gastrointestinal:  Positive for abdominal pain (see HPI). Negative for blood in stool, constipation, diarrhea, nausea and vomiting.   Genitourinary:  Positive for dysuria and flank pain. Negative for difficulty urinating.   Musculoskeletal:  Negative for arthralgias, back pain and gait problem.   Skin:  Negative for rash and wound.   Neurological:  Negative for headaches.   Hematological:  Negative for adenopathy. Does not bruise/bleed easily.     Objective:      /79 (BP Location: Left arm, Patient Position: Sitting)   Pulse 83   Temp 98.1 °F (36.7 °C) (Temporal)   Resp 18   Ht 5' 4" (1.626 m)   Wt 77.4 kg (170 lb 10.2 oz)   SpO2 98%   BMI 29.29 kg/m²      Physical Exam  Vitals reviewed.   Constitutional:       General: She is not in acute distress.     Appearance: Normal appearance. She is well-developed. She is obese.   HENT:      Head: Normocephalic.   Neck:      Thyroid: No thyromegaly.      Trachea: Trachea normal. No tracheal tenderness or tracheal deviation.   Cardiovascular:      Rate and Rhythm: Normal rate and regular rhythm.      Pulses: Normal pulses.           Carotid pulses are 2+ on the right side and 2+ on the left side.       Radial pulses are 2+ on the right side and 2+ on the left side.      Heart sounds: Normal heart sounds. No murmur heard.    No gallop.   Pulmonary:      Effort: Pulmonary effort is normal. No respiratory distress.      Breath sounds: Normal breath sounds. No stridor. No wheezing, rhonchi or rales.   Abdominal:      General: Bowel sounds are normal.      Palpations: Abdomen is soft. There is no splenomegaly or mass.      Tenderness: There is abdominal tenderness in the right upper quadrant, " right lower quadrant, suprapubic area and left lower quadrant. There is no guarding or rebound.   Musculoskeletal:         General: Normal range of motion.      Cervical back: Full passive range of motion without pain, normal range of motion and neck supple. No edema.      Right lower leg: No edema.      Left lower leg: No edema.      Comments: Gait and coordination normal.  strong, equal. Upper and lower extremity strength normal.    Skin:     General: Skin is warm and dry.      Capillary Refill: Capillary refill takes less than 2 seconds.   Neurological:      General: No focal deficit present.      Mental Status: She is alert and oriented to person, place, and time.      Gait: Gait normal.   Psychiatric:         Attention and Perception: Attention and perception normal.         Mood and Affect: Mood and affect normal.         Speech: Speech normal.         Behavior: Behavior normal.       Assessment:       1. Dysuria    2. Right upper quadrant abdominal pain    3. Flank pain    4. Hematuria, unspecified type    5. Laboratory exam ordered as part of routine general medical examination    6. History of kidney stones    7. Hiatal hernia        Plan:       Dysuria: see urine test results. Sent for culture.  -     POCT URINE DIPSTICK WITHOUT MICROSCOPE  -     Urine culture; Future; Expected date: 11/30/2022    Right upper quadrant abdominal pain: schedule CT to evaluate for kidney stone  -     CT Renal Stone Study ABD Pelvis WO; Future; Expected date: 11/30/2022    Flank pain  -     CT Renal Stone Study ABD Pelvis WO; Future; Expected date: 11/30/2022    Hematuria, unspecified type  -     CT Renal Stone Study ABD Pelvis WO; Future; Expected date: 11/30/2022    Laboratory exam ordered as part of routine general medical examination: to be done before her annual exam  -     Comprehensive Metabolic Panel; Future; Expected date: 11/30/2022  -     CBC Auto Differential; Future; Expected date: 11/30/2022  -     Iron and  TIBC; Future; Expected date: 11/30/2022  -     Lipid Panel; Future; Expected date: 11/30/2022  -     TSH; Future; Expected date: 11/30/2022  -     Vitamin D; Future; Expected date: 11/30/2022    History of kidney stones    Hiatal hernia: per CT scan 2019. Moderate to large gastric diaphragmatic hernia. She has seen general surgery about this.      Continue current medication  Take medications only as prescribed  Healthy diet, exercise  Adequate rest  Adequate hydration  Avoid allergens  Avoid excessive caffeine      Follow up after testing, sooner if symptoms worsen

## 2022-12-01 LAB
BACTERIA UR CULT: NORMAL
BACTERIA UR CULT: NORMAL

## 2022-12-03 ENCOUNTER — PATIENT MESSAGE (OUTPATIENT)
Dept: FAMILY MEDICINE | Facility: CLINIC | Age: 57
End: 2022-12-03
Payer: COMMERCIAL

## 2022-12-19 ENCOUNTER — LAB VISIT (OUTPATIENT)
Dept: LAB | Facility: HOSPITAL | Age: 57
End: 2022-12-19
Attending: NURSE PRACTITIONER
Payer: COMMERCIAL

## 2022-12-19 DIAGNOSIS — Z00.00 LABORATORY EXAM ORDERED AS PART OF ROUTINE GENERAL MEDICAL EXAMINATION: ICD-10-CM

## 2022-12-19 LAB
25(OH)D3+25(OH)D2 SERPL-MCNC: 24 NG/ML (ref 30–96)
ALBUMIN SERPL BCP-MCNC: 4 G/DL (ref 3.5–5.2)
ALP SERPL-CCNC: 73 U/L (ref 55–135)
ALT SERPL W/O P-5'-P-CCNC: 16 U/L (ref 10–44)
ANION GAP SERPL CALC-SCNC: 10 MMOL/L (ref 8–16)
AST SERPL-CCNC: 17 U/L (ref 10–40)
BASOPHILS # BLD AUTO: 0.04 K/UL (ref 0–0.2)
BASOPHILS NFR BLD: 1 % (ref 0–1.9)
BILIRUB SERPL-MCNC: 0.8 MG/DL (ref 0.1–1)
BUN SERPL-MCNC: 11 MG/DL (ref 6–20)
CALCIUM SERPL-MCNC: 9.2 MG/DL (ref 8.7–10.5)
CHLORIDE SERPL-SCNC: 107 MMOL/L (ref 95–110)
CHOLEST SERPL-MCNC: 170 MG/DL (ref 120–199)
CHOLEST/HDLC SERPL: 3.1 {RATIO} (ref 2–5)
CO2 SERPL-SCNC: 25 MMOL/L (ref 23–29)
CREAT SERPL-MCNC: 0.9 MG/DL (ref 0.5–1.4)
DIFFERENTIAL METHOD: NORMAL
EOSINOPHIL # BLD AUTO: 0.1 K/UL (ref 0–0.5)
EOSINOPHIL NFR BLD: 2 % (ref 0–8)
ERYTHROCYTE [DISTWIDTH] IN BLOOD BY AUTOMATED COUNT: 12.8 % (ref 11.5–14.5)
EST. GFR  (NO RACE VARIABLE): >60 ML/MIN/1.73 M^2
GLUCOSE SERPL-MCNC: 88 MG/DL (ref 70–110)
HCT VFR BLD AUTO: 43.5 % (ref 37–48.5)
HDLC SERPL-MCNC: 55 MG/DL (ref 40–75)
HDLC SERPL: 32.4 % (ref 20–50)
HGB BLD-MCNC: 14.4 G/DL (ref 12–16)
IMM GRANULOCYTES # BLD AUTO: 0 K/UL (ref 0–0.04)
IMM GRANULOCYTES NFR BLD AUTO: 0 % (ref 0–0.5)
IRON SERPL-MCNC: 118 UG/DL (ref 30–160)
LDLC SERPL CALC-MCNC: 87.6 MG/DL (ref 63–159)
LYMPHOCYTES # BLD AUTO: 1.2 K/UL (ref 1–4.8)
LYMPHOCYTES NFR BLD: 30.3 % (ref 18–48)
MCH RBC QN AUTO: 30.8 PG (ref 27–31)
MCHC RBC AUTO-ENTMCNC: 33.1 G/DL (ref 32–36)
MCV RBC AUTO: 93 FL (ref 82–98)
MONOCYTES # BLD AUTO: 0.4 K/UL (ref 0.3–1)
MONOCYTES NFR BLD: 8.7 % (ref 4–15)
NEUTROPHILS # BLD AUTO: 2.3 K/UL (ref 1.8–7.7)
NEUTROPHILS NFR BLD: 58 % (ref 38–73)
NONHDLC SERPL-MCNC: 115 MG/DL
NRBC BLD-RTO: 0 /100 WBC
PLATELET # BLD AUTO: 209 K/UL (ref 150–450)
PMV BLD AUTO: 9.5 FL (ref 9.2–12.9)
POTASSIUM SERPL-SCNC: 3.7 MMOL/L (ref 3.5–5.1)
PROT SERPL-MCNC: 6.9 G/DL (ref 6–8.4)
RBC # BLD AUTO: 4.68 M/UL (ref 4–5.4)
SATURATED IRON: 29 % (ref 20–50)
SODIUM SERPL-SCNC: 142 MMOL/L (ref 136–145)
TOTAL IRON BINDING CAPACITY: 414 UG/DL (ref 250–450)
TRANSFERRIN SERPL-MCNC: 280 MG/DL (ref 200–375)
TRIGL SERPL-MCNC: 137 MG/DL (ref 30–150)
TSH SERPL DL<=0.005 MIU/L-ACNC: 2.18 UIU/ML (ref 0.4–4)
WBC # BLD AUTO: 4.03 K/UL (ref 3.9–12.7)

## 2022-12-19 PROCEDURE — 36415 COLL VENOUS BLD VENIPUNCTURE: CPT | Mod: PN | Performed by: NURSE PRACTITIONER

## 2022-12-19 PROCEDURE — 84443 ASSAY THYROID STIM HORMONE: CPT | Performed by: NURSE PRACTITIONER

## 2022-12-19 PROCEDURE — 80061 LIPID PANEL: CPT | Performed by: NURSE PRACTITIONER

## 2022-12-19 PROCEDURE — 82306 VITAMIN D 25 HYDROXY: CPT | Performed by: NURSE PRACTITIONER

## 2022-12-19 PROCEDURE — 85025 COMPLETE CBC W/AUTO DIFF WBC: CPT | Performed by: NURSE PRACTITIONER

## 2022-12-19 PROCEDURE — 80053 COMPREHEN METABOLIC PANEL: CPT | Performed by: NURSE PRACTITIONER

## 2022-12-19 PROCEDURE — 84466 ASSAY OF TRANSFERRIN: CPT | Performed by: NURSE PRACTITIONER

## 2022-12-21 ENCOUNTER — OFFICE VISIT (OUTPATIENT)
Dept: FAMILY MEDICINE | Facility: CLINIC | Age: 57
End: 2022-12-21
Payer: COMMERCIAL

## 2022-12-21 VITALS
RESPIRATION RATE: 16 BRPM | HEIGHT: 64 IN | WEIGHT: 165.69 LBS | TEMPERATURE: 98 F | BODY MASS INDEX: 28.29 KG/M2 | DIASTOLIC BLOOD PRESSURE: 82 MMHG | HEART RATE: 89 BPM | OXYGEN SATURATION: 98 % | SYSTOLIC BLOOD PRESSURE: 122 MMHG

## 2022-12-21 DIAGNOSIS — K58.9 IRRITABLE BOWEL SYNDROME, UNSPECIFIED TYPE: ICD-10-CM

## 2022-12-21 DIAGNOSIS — E55.9 VITAMIN D DEFICIENCY: ICD-10-CM

## 2022-12-21 DIAGNOSIS — K21.00 GASTROESOPHAGEAL REFLUX DISEASE WITH ESOPHAGITIS WITHOUT HEMORRHAGE: ICD-10-CM

## 2022-12-21 DIAGNOSIS — F41.9 ANXIETY: ICD-10-CM

## 2022-12-21 DIAGNOSIS — Z23 IMMUNIZATION DUE: Primary | ICD-10-CM

## 2022-12-21 DIAGNOSIS — Z00.00 ANNUAL PHYSICAL EXAM: Primary | ICD-10-CM

## 2022-12-21 PROCEDURE — 3008F PR BODY MASS INDEX (BMI) DOCUMENTED: ICD-10-PCS | Mod: CPTII,S$GLB,, | Performed by: NURSE PRACTITIONER

## 2022-12-21 PROCEDURE — 3079F PR MOST RECENT DIASTOLIC BLOOD PRESSURE 80-89 MM HG: ICD-10-PCS | Mod: CPTII,S$GLB,, | Performed by: NURSE PRACTITIONER

## 2022-12-21 PROCEDURE — 99396 PREV VISIT EST AGE 40-64: CPT | Mod: S$GLB,,, | Performed by: NURSE PRACTITIONER

## 2022-12-21 PROCEDURE — 1160F RVW MEDS BY RX/DR IN RCRD: CPT | Mod: CPTII,S$GLB,, | Performed by: NURSE PRACTITIONER

## 2022-12-21 PROCEDURE — 3074F SYST BP LT 130 MM HG: CPT | Mod: CPTII,S$GLB,, | Performed by: NURSE PRACTITIONER

## 2022-12-21 PROCEDURE — 99396 PR PREVENTIVE VISIT,EST,40-64: ICD-10-PCS | Mod: S$GLB,,, | Performed by: NURSE PRACTITIONER

## 2022-12-21 PROCEDURE — 3079F DIAST BP 80-89 MM HG: CPT | Mod: CPTII,S$GLB,, | Performed by: NURSE PRACTITIONER

## 2022-12-21 PROCEDURE — 3008F BODY MASS INDEX DOCD: CPT | Mod: CPTII,S$GLB,, | Performed by: NURSE PRACTITIONER

## 2022-12-21 PROCEDURE — 1159F MED LIST DOCD IN RCRD: CPT | Mod: CPTII,S$GLB,, | Performed by: NURSE PRACTITIONER

## 2022-12-21 PROCEDURE — 3074F PR MOST RECENT SYSTOLIC BLOOD PRESSURE < 130 MM HG: ICD-10-PCS | Mod: CPTII,S$GLB,, | Performed by: NURSE PRACTITIONER

## 2022-12-21 PROCEDURE — 1160F PR REVIEW ALL MEDS BY PRESCRIBER/CLIN PHARMACIST DOCUMENTED: ICD-10-PCS | Mod: CPTII,S$GLB,, | Performed by: NURSE PRACTITIONER

## 2022-12-21 PROCEDURE — 1159F PR MEDICATION LIST DOCUMENTED IN MEDICAL RECORD: ICD-10-PCS | Mod: CPTII,S$GLB,, | Performed by: NURSE PRACTITIONER

## 2022-12-21 RX ORDER — DICYCLOMINE HYDROCHLORIDE 20 MG/1
20 TABLET ORAL 2 TIMES DAILY PRN
Qty: 60 TABLET | Refills: 2 | Status: SHIPPED | OUTPATIENT
Start: 2022-12-21 | End: 2023-01-20

## 2022-12-21 NOTE — PROGRESS NOTES
Subjective:       Patient ID: Lavern oHdges is a 57 y.o. female.    Chief Complaint: Annual Exam    HPI here for annual exam. Labs done recently reviewed. States she has been doing alright. She has several chronic issues to review. See ROS/assessment and plan    The following portion of the patients history was reviewed and updated as appropriate: allergies, current medications, past medical and surgical history. Past social history and problem list reviewed. Family PMH and Past social history reviewed. Tobacco, Illicit drug use reviewed.      Review of patient's allergies indicates:   Allergen Reactions    Cefaclor Hives    Codeine Hives         Current Outpatient Medications:     calcium carbonate (TUMS) 200 mg calcium (500 mg) chewable tablet, Take 1 tablet by mouth daily as needed for Heartburn., Disp: , Rfl:     chlorhexidine (PERIDEX) 0.12 % solution, daily as needed., Disp: , Rfl:     cholecalciferol, vitamin D3, (VITAMIN D3) 25 mcg (1,000 unit) capsule, 2,000 Units., Disp: , Rfl:     dicyclomine (BENTYL) 10 MG capsule, 20 mg., Disp: , Rfl:     esomeprazole (NEXIUM) 40 MG capsule, Take 1 capsule by mouth once daily, Disp: 90 capsule, Rfl: 1    VENTOLIN HFA 90 mcg/actuation inhaler, INHALE 2 PUFFS BY MOUTH INTO THE LUNGS EVERY 6 HOURS AS NEEDED FOR WHEEZING (Patient not taking: Reported on 2022), Disp: 18 g, Rfl: 2    Current Facility-Administered Medications:     triamcinolone acetonide injection 40 mg, 40 mg, Intramuscular, 1 time in Clinic/HOD, [facility]ACARIAS GillilandM    Past Medical History:   Diagnosis Date    Allergy     Anxiety     Asthma     due to allergies     GERD (gastroesophageal reflux disease)     History of nephrolithiasis     Irritable bowel syndrome     Kidney stone     lithotripsy 20 years ago    Peptic ulcer disease     PONV (postoperative nausea and vomiting)     Colin syndrome     Vitamin D deficiency        Past Surgical History:   Procedure Laterality Date     SECTION       times 2    COLONOSCOPY N/A 2016    Procedure: COLONOSCOPY;  Surgeon: Sherman Arevalo MD;  Location: [facility] ENDO;  Service: Endoscopy;  Laterality: N/A; repeat in 10 years. clear.  internal/external hemorrhoids only.    ESOPHAGOGASTRODUODENOSCOPY  2016    Dr. Arevalo: Medium-sized hiatus hernia. a few gastric polyps biopsied; biopsy: Fundic gland polyps, no dysplasia    ESOPHAGOGASTRODUODENOSCOPY N/A 2021    Procedure: EGD (ESOPHAGOGASTRODUODENOSCOPY);  Surgeon: Sherman Arevalo MD;  Location: [facility] ENDO;  Service: Endoscopy;  Laterality: N/A;    HYSTEROSCOPY      TUBAL LIGATION      UPPER GASTROINTESTINAL ENDOSCOPY  10/29/2010    Dr. Arevalo: mil schatzki ring- dilated, gastric polyp removed, otherwise normal findings. repeat PRN    WISDOM TOOTH EXTRACTION         Social History     Socioeconomic History    Marital status:     Number of children: 2   Occupational History     Employer: [employer]   Tobacco Use    Smoking status: Former     Types: Cigarettes     Quit date: 8/3/1994     Years since quittin.4    Smokeless tobacco: Never   Substance and Sexual Activity    Alcohol use: No    Drug use: No    Sexual activity: Yes     Partners: Male     Birth control/protection: None, See Surgical Hx     Review of Systems   Constitutional:  Negative for fatigue and fever.   HENT:  Negative for congestion, postnasal drip, rhinorrhea and voice change.    Eyes:  Negative for visual disturbance.   Respiratory:  Negative for cough, chest tightness, shortness of breath and wheezing.    Cardiovascular:  Negative for chest pain, palpitations and leg swelling.   Gastrointestinal:  Negative for abdominal pain, blood in stool, constipation, diarrhea, nausea and vomiting.   Genitourinary:  Negative for difficulty urinating and dysuria.   Musculoskeletal:  Negative for arthralgias, back pain and gait problem.   Skin:  Negative for rash and wound.   Neurological:  Negative for dizziness, weakness and  "headaches.   Hematological:  Negative for adenopathy. Does not bruise/bleed easily.   Psychiatric/Behavioral:  Negative for decreased concentration, dysphoric mood and sleep disturbance. The patient is not nervous/anxious.      Objective:      /82 (BP Location: Right arm, Patient Position: Sitting)   Pulse 89   Temp 98.4 °F (36.9 °C) (Temporal)   Resp 16   Ht 5' 4" (1.626 m)   Wt 75.1 kg (165 lb 10.8 oz)   LMP 04/15/2021   SpO2 98%   BMI 28.44 kg/m²      Physical Exam  Vitals reviewed.   Constitutional:       General: She is not in acute distress.     Appearance: Normal appearance. She is well-developed. She is obese.   HENT:      Head: Normocephalic.      Mouth/Throat:      Mouth: Mucous membranes are moist.      Pharynx: Oropharynx is clear.   Eyes:      Conjunctiva/sclera: Conjunctivae normal.      Pupils: Pupils are equal, round, and reactive to light.   Neck:      Thyroid: No thyromegaly.      Vascular: No carotid bruit.      Trachea: Trachea normal. No tracheal tenderness or tracheal deviation.   Cardiovascular:      Rate and Rhythm: Normal rate and regular rhythm.      Pulses: Normal pulses.           Carotid pulses are 2+ on the right side and 2+ on the left side.       Radial pulses are 2+ on the right side and 2+ on the left side.      Heart sounds: Normal heart sounds. No murmur heard.    No gallop.   Pulmonary:      Effort: Pulmonary effort is normal. No respiratory distress.      Breath sounds: Normal breath sounds. No stridor. No wheezing, rhonchi or rales.   Abdominal:      General: Bowel sounds are normal.      Palpations: Abdomen is soft. There is no splenomegaly or mass.      Tenderness: There is no abdominal tenderness. There is no guarding or rebound.   Musculoskeletal:         General: Normal range of motion.      Cervical back: Full passive range of motion without pain, normal range of motion and neck supple. No edema.      Right lower leg: No edema.      Left lower leg: No edema. "      Comments: Gait and coordination normal.  strong, equal. Upper and lower extremity strength normal.    Skin:     General: Skin is warm and dry.      Capillary Refill: Capillary refill takes less than 2 seconds.      Findings: No lesion or rash.   Neurological:      General: No focal deficit present.      Mental Status: She is alert and oriented to person, place, and time.      Gait: Gait normal.   Psychiatric:         Attention and Perception: Attention and perception normal.         Mood and Affect: Mood and affect normal.         Speech: Speech normal.         Behavior: Behavior normal.       Assessment:       1. Annual physical exam    2. Vitamin D deficiency    3. Gastroesophageal reflux disease with esophagitis without hemorrhage    4. Anxiety    5. Irritable bowel syndrome, unspecified type        Plan:       Annual physical exam    Vitamin D deficiency: continue Vit D replacement    Gastroesophageal reflux disease with esophagitis without hemorrhage: nexium as prescribed.    Anxiety: stable without medication at this time.     IBS: bentyl BID prn.     Other orders  -     dicyclomine (BENTYL) 20 mg tablet; Take 1 tablet (20 mg total) by mouth 2 (two) times daily as needed (IBS flare up).  Dispense: 60 tablet; Refill: 2       Continue current medication  Take medications only as prescribed  Healthy diet, exercise  Adequate rest  Adequate hydration  Avoid allergens  Avoid excessive caffeine      Follow up 6 months

## 2022-12-30 ENCOUNTER — PATIENT MESSAGE (OUTPATIENT)
Dept: FAMILY MEDICINE | Facility: CLINIC | Age: 57
End: 2022-12-30
Payer: COMMERCIAL

## 2022-12-31 ENCOUNTER — OFFICE VISIT (OUTPATIENT)
Dept: URGENT CARE | Facility: CLINIC | Age: 57
End: 2022-12-31
Payer: COMMERCIAL

## 2022-12-31 VITALS
SYSTOLIC BLOOD PRESSURE: 122 MMHG | RESPIRATION RATE: 16 BRPM | OXYGEN SATURATION: 100 % | WEIGHT: 165 LBS | HEART RATE: 90 BPM | HEIGHT: 64 IN | BODY MASS INDEX: 28.17 KG/M2 | DIASTOLIC BLOOD PRESSURE: 73 MMHG | TEMPERATURE: 98 F

## 2022-12-31 DIAGNOSIS — T78.40XA ALLERGIC REACTION, INITIAL ENCOUNTER: ICD-10-CM

## 2022-12-31 DIAGNOSIS — L29.9 ITCHY SKIN: ICD-10-CM

## 2022-12-31 DIAGNOSIS — L50.9 HIVES: Primary | ICD-10-CM

## 2022-12-31 PROBLEM — G89.29 CHRONIC MIDLINE LOW BACK PAIN WITHOUT SCIATICA: Status: RESOLVED | Noted: 2018-07-06 | Resolved: 2022-12-31

## 2022-12-31 PROBLEM — K21.9 GERD (GASTROESOPHAGEAL REFLUX DISEASE): Status: RESOLVED | Noted: 2021-07-23 | Resolved: 2022-12-31

## 2022-12-31 PROBLEM — M54.50 CHRONIC MIDLINE LOW BACK PAIN WITHOUT SCIATICA: Status: RESOLVED | Noted: 2018-07-06 | Resolved: 2022-12-31

## 2022-12-31 PROCEDURE — 1160F PR REVIEW ALL MEDS BY PRESCRIBER/CLIN PHARMACIST DOCUMENTED: ICD-10-PCS | Mod: CPTII,S$GLB,, | Performed by: NURSE PRACTITIONER

## 2022-12-31 PROCEDURE — 3008F PR BODY MASS INDEX (BMI) DOCUMENTED: ICD-10-PCS | Mod: CPTII,S$GLB,, | Performed by: NURSE PRACTITIONER

## 2022-12-31 PROCEDURE — 1159F MED LIST DOCD IN RCRD: CPT | Mod: CPTII,S$GLB,, | Performed by: NURSE PRACTITIONER

## 2022-12-31 PROCEDURE — 3078F PR MOST RECENT DIASTOLIC BLOOD PRESSURE < 80 MM HG: ICD-10-PCS | Mod: CPTII,S$GLB,, | Performed by: NURSE PRACTITIONER

## 2022-12-31 PROCEDURE — 1160F RVW MEDS BY RX/DR IN RCRD: CPT | Mod: CPTII,S$GLB,, | Performed by: NURSE PRACTITIONER

## 2022-12-31 PROCEDURE — 99213 OFFICE O/P EST LOW 20 MIN: CPT | Mod: 25,S$GLB,, | Performed by: NURSE PRACTITIONER

## 2022-12-31 PROCEDURE — 3074F SYST BP LT 130 MM HG: CPT | Mod: CPTII,S$GLB,, | Performed by: NURSE PRACTITIONER

## 2022-12-31 PROCEDURE — 3008F BODY MASS INDEX DOCD: CPT | Mod: CPTII,S$GLB,, | Performed by: NURSE PRACTITIONER

## 2022-12-31 PROCEDURE — 99213 PR OFFICE/OUTPT VISIT, EST, LEVL III, 20-29 MIN: ICD-10-PCS | Mod: 25,S$GLB,, | Performed by: NURSE PRACTITIONER

## 2022-12-31 PROCEDURE — 3074F PR MOST RECENT SYSTOLIC BLOOD PRESSURE < 130 MM HG: ICD-10-PCS | Mod: CPTII,S$GLB,, | Performed by: NURSE PRACTITIONER

## 2022-12-31 PROCEDURE — 3078F DIAST BP <80 MM HG: CPT | Mod: CPTII,S$GLB,, | Performed by: NURSE PRACTITIONER

## 2022-12-31 PROCEDURE — 1159F PR MEDICATION LIST DOCUMENTED IN MEDICAL RECORD: ICD-10-PCS | Mod: CPTII,S$GLB,, | Performed by: NURSE PRACTITIONER

## 2022-12-31 PROCEDURE — 96372 THER/PROPH/DIAG INJ SC/IM: CPT | Mod: S$GLB,,, | Performed by: FAMILY MEDICINE

## 2022-12-31 PROCEDURE — 96372 PR INJECTION,THERAP/PROPH/DIAG2ST, IM OR SUBCUT: ICD-10-PCS | Mod: S$GLB,,, | Performed by: FAMILY MEDICINE

## 2022-12-31 RX ORDER — METHYLPREDNISOLONE 4 MG/1
TABLET ORAL
Qty: 21 EACH | Refills: 0 | Status: SHIPPED | OUTPATIENT
Start: 2022-12-31 | End: 2023-01-21

## 2022-12-31 RX ORDER — DEXAMETHASONE SODIUM PHOSPHATE 100 MG/10ML
8 INJECTION INTRAMUSCULAR; INTRAVENOUS
Status: COMPLETED | OUTPATIENT
Start: 2022-12-31 | End: 2022-12-31

## 2022-12-31 RX ORDER — HYDROXYZINE HYDROCHLORIDE 25 MG/1
25 TABLET, FILM COATED ORAL 3 TIMES DAILY PRN
Qty: 21 TABLET | Refills: 0 | Status: SHIPPED | OUTPATIENT
Start: 2022-12-31 | End: 2023-07-12 | Stop reason: ALTCHOICE

## 2022-12-31 RX ADMIN — DEXAMETHASONE SODIUM PHOSPHATE 8 MG: 100 INJECTION INTRAMUSCULAR; INTRAVENOUS at 03:12

## 2022-12-31 NOTE — PATIENT INSTRUCTIONS
- You received a steroid shot today.  This can elevate your blood pressure, elevate your blood sugar, water weight gain, nervous energy, redness to the face and dimpling of the skin where the shot goes in.   - Do not use steroids more than 3 times per year.   - If you have diabetes, please check you blood sugar frequently.  - If you have high blood pressure, please check your blood pressure frequently.           INSTRUCTIONS:  - Rest.  - Drink plenty of fluids.  - Take Tylenol and/or Ibuprofen as directed as needed for fever/pain.  Do not take more than the recommended dose.  - follow up with your PCP within the next 1-2 weeks as needed.  - You must understand that you have received an Urgent Care treatment only and that you may be released before all of your medical problems are known or treated.   - You, the patient, will arrange for follow up care as instructed.   - If your condition worsens or fails to improve we recommend that you receive another evaluation at the ER immediately or contact your PCP to discuss your concerns.   - You can call [phone] or [phone] to help schedule an appointment with the appropriate provider.     -If you smoke cigarettes, it would be beneficial for you to stop.

## 2022-12-31 NOTE — PROGRESS NOTES
"Subjective:       Patient ID: Lavern Hodges is a 57 y.o. female.    Vitals:  height is 5' 4" (1.626 m) and weight is 74.8 kg (165 lb). Her temperature is 97.8 °F (36.6 °C). Her blood pressure is 122/73 and her pulse is 90. Her respiration is 16 and oxygen saturation is 100%.     Chief Complaint: Rash    Patient presents today with complaints of rash on torso, underarms & behind knees since this morning. Patient is taking claritan OTC & Cortizone cream OTC with no relief. Patient is covid & flu vaccinated.     Provider note begins below:   Patient denies any fever, cough or shortness of breath.  Patient did report that she used a new cleaning product but did not come in contact with it.  Patient also reports that he ate peanut butter last night but denies an allergy to peanuts.  Patient is awake and alert.  Speaking in full sentences.    Rash  This is a new problem. The current episode started today. The affected locations include the abdomen, chest, neck, torso, left arm and left lower leg. The rash is characterized by itchiness, redness and swelling. Pertinent negatives include no congestion, cough, diarrhea, eye pain, fatigue, sore throat or vomiting.     Constitution: Negative. Negative for chills, sweating and fatigue.   HENT: Negative.  Negative for ear pain, facial swelling, congestion and sore throat.    Neck: Negative for painful lymph nodes.   Cardiovascular: Negative.  Negative for chest trauma, chest pain and sob on exertion.   Eyes: Negative.  Negative for eye itching and eye pain.   Respiratory: Negative.  Negative for chest tightness, cough and asthma.    Gastrointestinal: Negative.  Negative for nausea, vomiting and diarrhea.   Endocrine: negative. cold intolerance and excessive thirst.   Genitourinary: Negative.  Negative for dysuria, frequency, urgency and hematuria.   Musculoskeletal:  Negative for pain, trauma and joint pain.   Skin:  Positive for rash. Negative for wound, erythema and hives. "   Allergic/Immunologic: Negative.  Negative for eczema, asthma, hives and itching.   Neurological: Negative.  Negative for disorientation and altered mental status.   Hematologic/Lymphatic: Negative.  Negative for swollen lymph nodes.   Psychiatric/Behavioral: Negative.  Negative for altered mental status, disorientation and confusion.      Objective:      Physical Exam   Constitutional: She is oriented to person, place, and time. She appears well-developed.  Non-toxic appearance. She does not appear ill. No distress.   HENT:   Head: Normocephalic and atraumatic. Head is without abrasion, without contusion and without laceration.   Ears:   Right Ear: External ear normal.   Left Ear: External ear normal.   Nose: Nose normal.   Mouth/Throat: Uvula is midline, oropharynx is clear and moist and mucous membranes are normal.   Patent airway.      Comments: Patent airway.  Eyes: Conjunctivae, EOM and lids are normal. Pupils are equal, round, and reactive to light.   Neck: Trachea normal and phonation normal. Neck supple.   Cardiovascular: Normal rate, regular rhythm and normal heart sounds.   Pulmonary/Chest: Effort normal and breath sounds normal. No stridor. No respiratory distress. She has no wheezes. She has no rhonchi. She has no rales. She exhibits no tenderness.   Musculoskeletal: Normal range of motion.         General: Normal range of motion.   Neurological: no focal deficit. She is alert and oriented to person, place, and time.   Skin: Skin is warm, dry, intact, not diaphoretic and no rash. Capillary refill takes less than 2 seconds. No abrasion, No burn, No bruising, No erythema and No ecchymosis         Comments: Patient has all over hives to her neck, scalp, chest, bilateral axillary, back and legs.   Psychiatric: Her speech is normal and behavior is normal. Mood, judgment and thought content normal.   Nursing note and vitals reviewed.      Assessment:       1. Hives    2. Allergic reaction, initial encounter     3. Itchy skin          Plan:       All hx was provided by the pt or available as part of established EMR. The pt past medical hx, family hx, social hx, and current medications were reviewed. Tx discussed. Pt may follow up with [facility] for any concern. ER precautions. Pt voiced understanding of all discussed, and agreed.     Patient instructed that if the hives resolve after the Decadron injection she does not have to take the Medrol Dosepak.  Patient acknowledged this.    FOLLOWUP  Follow up if symptoms worsen or fail to improve, for PLEASE CONTACT PCP OR CONTACT THE EMERGENCY ROOM..     PATIENT INSTRUCTIONS  Patient Instructions   - You received a steroid shot today.  This can elevate your blood pressure, elevate your blood sugar, water weight gain, nervous energy, redness to the face and dimpling of the skin where the shot goes in.   - Do not use steroids more than 3 times per year.   - If you have diabetes, please check you blood sugar frequently.  - If you have high blood pressure, please check your blood pressure frequently.           INSTRUCTIONS:  - Rest.  - Drink plenty of fluids.  - Take Tylenol and/or Ibuprofen as directed as needed for fever/pain.  Do not take more than the recommended dose.  - follow up with your PCP within the next 1-2 weeks as needed.  - You must understand that you have received an Urgent Care treatment only and that you may be released before all of your medical problems are known or treated.   - You, the patient, will arrange for follow up care as instructed.   - If your condition worsens or fails to improve we recommend that you receive another evaluation at the ER immediately or contact your PCP to discuss your concerns.   - You can call [phone] or [phone] to help schedule an appointment with the appropriate provider.     -If you smoke cigarettes, it would be beneficial for you to stop.         THANK YOU FOR ALLOWING ME TO PARTICIPATE IN YOUR HEALTHCARE,     Rubin ANGELES  CHELLE Floyd   Hives  -     dexAMETHasone injection 8 mg  -     methylPREDNISolone (MEDROL DOSEPACK) 4 mg tablet; use as directed  Dispense: 21 each; Refill: 0    Allergic reaction, initial encounter  -     dexAMETHasone injection 8 mg  -     methylPREDNISolone (MEDROL DOSEPACK) 4 mg tablet; use as directed  Dispense: 21 each; Refill: 0    Itchy skin  -     hydrOXYzine HCL (ATARAX) 25 MG tablet; Take 1 tablet (25 mg total) by mouth 3 (three) times daily as needed for Itching.  Dispense: 21 tablet; Refill: 0

## 2023-01-10 ENCOUNTER — TELEPHONE (OUTPATIENT)
Dept: FAMILY MEDICINE | Facility: CLINIC | Age: 58
End: 2023-01-10
Payer: COMMERCIAL

## 2023-01-10 NOTE — TELEPHONE ENCOUNTER
----- Message from Ryan Peña sent at 1/9/2023  1:03 PM CST -----  Type: Patient Call Back         Who called:Pt          What is the request in detail: pt called in regarding wanting to know if she have to reschedule Nurse visit shingrix #1 ?. Pt when to the urgent care with complaints of rash on torso, underarms & behind knees          Can the clinic reply by [facility]? No          Would the patient rather a call back or a response via [facility]? Call back          Best call back number: [phone] (mobile)          Additional Information:           Thank You

## 2023-01-25 ENCOUNTER — TELEPHONE (OUTPATIENT)
Dept: FAMILY MEDICINE | Facility: CLINIC | Age: 58
End: 2023-01-25

## 2023-01-25 NOTE — TELEPHONE ENCOUNTER
----- Message from River Melo sent at 1/25/2023 12:22 PM CST -----  Who Called: patient         What is the reqeust in detail: Requesting call back to discuss rescheduling appt that was 1/25/23 to another day. Please give pt a call          Can the clinic reply by [facility]? no         Best Call Back Number: [phone]           Additional Information:

## 2023-01-25 NOTE — TELEPHONE ENCOUNTER
Pt has a rash close to her neck and would like to see Dermatology before getting her Shingrix vaccine.

## 2023-03-08 ENCOUNTER — OFFICE VISIT (OUTPATIENT)
Dept: PODIATRY | Facility: CLINIC | Age: 58
End: 2023-03-08
Payer: COMMERCIAL

## 2023-03-08 VITALS — HEIGHT: 64 IN | WEIGHT: 164.88 LBS | BODY MASS INDEX: 28.15 KG/M2

## 2023-03-08 DIAGNOSIS — M79.671 RIGHT FOOT PAIN: Primary | ICD-10-CM

## 2023-03-08 DIAGNOSIS — M72.2 PLANTAR FASCIITIS: ICD-10-CM

## 2023-03-08 PROCEDURE — 99213 PR OFFICE/OUTPT VISIT, EST, LEVL III, 20-29 MIN: ICD-10-PCS | Mod: S$GLB,,, | Performed by: STUDENT IN AN ORGANIZED HEALTH CARE EDUCATION/TRAINING PROGRAM

## 2023-03-08 PROCEDURE — 3008F PR BODY MASS INDEX (BMI) DOCUMENTED: ICD-10-PCS | Mod: CPTII,S$GLB,, | Performed by: STUDENT IN AN ORGANIZED HEALTH CARE EDUCATION/TRAINING PROGRAM

## 2023-03-08 PROCEDURE — 3008F BODY MASS INDEX DOCD: CPT | Mod: CPTII,S$GLB,, | Performed by: STUDENT IN AN ORGANIZED HEALTH CARE EDUCATION/TRAINING PROGRAM

## 2023-03-08 PROCEDURE — 99999 PR PBB SHADOW E&M-EST. PATIENT-LVL III: CPT | Mod: PBBFAC,,, | Performed by: STUDENT IN AN ORGANIZED HEALTH CARE EDUCATION/TRAINING PROGRAM

## 2023-03-08 PROCEDURE — 99213 OFFICE O/P EST LOW 20 MIN: CPT | Mod: S$GLB,,, | Performed by: STUDENT IN AN ORGANIZED HEALTH CARE EDUCATION/TRAINING PROGRAM

## 2023-03-08 PROCEDURE — 1160F PR REVIEW ALL MEDS BY PRESCRIBER/CLIN PHARMACIST DOCUMENTED: ICD-10-PCS | Mod: CPTII,S$GLB,, | Performed by: STUDENT IN AN ORGANIZED HEALTH CARE EDUCATION/TRAINING PROGRAM

## 2023-03-08 PROCEDURE — 99999 PR PBB SHADOW E&M-EST. PATIENT-LVL III: ICD-10-PCS | Mod: PBBFAC,,, | Performed by: STUDENT IN AN ORGANIZED HEALTH CARE EDUCATION/TRAINING PROGRAM

## 2023-03-08 PROCEDURE — 1159F PR MEDICATION LIST DOCUMENTED IN MEDICAL RECORD: ICD-10-PCS | Mod: CPTII,S$GLB,, | Performed by: STUDENT IN AN ORGANIZED HEALTH CARE EDUCATION/TRAINING PROGRAM

## 2023-03-08 PROCEDURE — 1160F RVW MEDS BY RX/DR IN RCRD: CPT | Mod: CPTII,S$GLB,, | Performed by: STUDENT IN AN ORGANIZED HEALTH CARE EDUCATION/TRAINING PROGRAM

## 2023-03-08 PROCEDURE — 1159F MED LIST DOCD IN RCRD: CPT | Mod: CPTII,S$GLB,, | Performed by: STUDENT IN AN ORGANIZED HEALTH CARE EDUCATION/TRAINING PROGRAM

## 2023-03-08 NOTE — PROGRESS NOTES
Chief Complaint   Patient presents with    Heel Pain     Rt foot         MEDICAL DECISION MAKING         1. Right foot pain    2. Plantar fasciitis             I counseled the patient on the patient's conditions, their implications and medical management.     Plantar Fasciitis:  -Patient was given a printout of stretching exercises to stretch the achilles tendon and increase ankle dorsiflexion  -Patient instructed to ice with a frozen water bottle as instructed in the handout.  -Patient instructed to refrain from barefoot walking. I recommend Hoka slippers.  -Shoe gear was discussed with patient and recommended a supportive shoe with a stiff shank that doesn't flex at the arch. The shoe should also have an elevated heel. Some brands include Rogers, Asics and Hokas. The length of the shoe should accommodate the width of a thumb between the big toe and the edge of the shoe.    Continue stretching and wearing supportive shoes. Recommendations for sandals given. Patient to f/u as needed.    Ulysses Negro DPM          HPI:       Lavern Hodges is a 57 y.o. female who presents to clinic with concerns of right heel pain for 2 months.     Pain is worse with weight bearing and first few steps after prolonged periods of rest.     Patient denies acute trauma to the affected area.   Treatment tried: none    03/08/2023  Patient is doing much better after the injection.     Patient Active Problem List   Diagnosis    Gastroesophageal reflux disease with esophagitis    Screening for colon cancer    Cervical pain    Anxiety    Irritable bowel syndrome    Kidney stone    Vitamin D deficiency         Current Outpatient Medications on File Prior to Visit   Medication Sig Dispense Refill    calcium carbonate (TUMS) 200 mg calcium (500 mg) chewable tablet Take 1 tablet by mouth daily as needed for Heartburn.      chlorhexidine (PERIDEX) 0.12 % solution daily as needed.      cholecalciferol, vitamin D3, (VITAMIN D3) 25 mcg (1,000 unit) capsule  "2,000 Units.      esomeprazole (NEXIUM) 40 MG capsule Take 1 capsule by mouth once daily 90 capsule 1    hydrOXYzine HCL (ATARAX) 25 MG tablet Take 1 tablet (25 mg total) by mouth 3 (three) times daily as needed for Itching. 21 tablet 0    VENTOLIN HFA 90 mcg/actuation inhaler INHALE 2 PUFFS BY MOUTH INTO THE LUNGS EVERY 6 HOURS AS NEEDED FOR WHEEZING 18 g 2     Current Facility-Administered Medications on File Prior to Visit   Medication Dose Route Frequency Provider Last Rate Last Admin    triamcinolone acetonide injection 40 mg  40 mg Intramuscular 1 time in Clinic/HOD Ulysses Negro, DPM               Review of patient's allergies indicates:   Allergen Reactions    Cefaclor Hives    Codeine Hives         ROS:  General ROS: negative for  chills, fatigue or fever  Cardiovascular ROS: no chest pain or dyspnea on exertion  Musculoskeletal ROS: negative for joint pain or joint stiffness.  Negative for loss of strength.  Positive for foot pain.   Neuro ROS: Negative for syncope, numbness, or muscle weakness  Skin ROS: Negative for rash, itching or nail/hair changes.           OBJECTIVE:         Vitals:    03/08/23 1112   Weight: 74.8 kg (164 lb 14.5 oz)   Height: 5' 4" (1.626 m)        Right Lower extremity exam:  Vasc:   Palpable pedal pulses.   Feet appropriately warm to touch.   Cap refill time is within normal limits   Edema: none    Neurological:    Light touch, proprioception, and Sharp/dull sensation are all intact.   There is no Tinel's along the tarsal tunnel.      Derm:   No open lesions, macerations, or rashes  Bruising:  absent  Redness:  absent  Pedal hair:  present      MSK:    Palpable pain plantar medial tubercle of the calcaneus right,    tightness to the Achilles tendon with ROM right   There is no pain with the heel squeeze/compression  test.             "

## 2023-03-10 ENCOUNTER — TELEPHONE (OUTPATIENT)
Dept: FAMILY MEDICINE | Facility: CLINIC | Age: 58
End: 2023-03-10
Payer: COMMERCIAL

## 2023-03-10 NOTE — TELEPHONE ENCOUNTER
----- Message from Connie Meire sent at 3/9/2023  4:48 PM CST -----  Regarding: appointment  Contact: Patient  Type:  Same Day Appointment Request    Caller is requesting a same day appointment.  Caller declined first available appointment listed below.      Name of Caller:  Patient   When is the first available appointment?    Symptoms:    Best Call Back Number:  [phone] (home)     Additional Information:   Patient stated that she missed her appointment and needed to reschedule for her Shingles Vacc. Please contact patient to advise. Thanks!

## 2023-04-05 NOTE — TELEPHONE ENCOUNTER
Sent to pharmacy   [employer] Pregnancy And Lactation Text: This medication is Pregnancy Category B and is considered safe. There is some data to suggest avoiding during the third trimester. It is unknown if this medication is excreted in breast milk.

## 2023-04-24 ENCOUNTER — TELEPHONE (OUTPATIENT)
Dept: FAMILY MEDICINE | Facility: CLINIC | Age: 58
End: 2023-04-24
Payer: COMMERCIAL

## 2023-04-24 NOTE — TELEPHONE ENCOUNTER
----- Message from Amymickey Alanizdereck sent at 4/24/2023  1:28 PM CDT -----  Regarding: refill  Contact: PATIENT  Type:  RX Refill Request    Who Called:  Patient  Refill or New Rx:  refill  RX Name and Strength:  esomeprazole (NEXIUM) 40 MG capsule  How is the patient currently taking it? (ex. 1XDay):    Is this a 30 day or 90 day RX:    Preferred Pharmacy with phone number:        [facility] - [place], [place] - [address]  [address]  [facility] [place] [address]  Phone: [phone] Fax: [phone]        Local or Mail Order:    Ordering Provider:    Best Call Back Number:  [phone]    Additional Information:  Please call pt once sent. Patient is all out. thanks!

## 2023-05-19 ENCOUNTER — TELEPHONE (OUTPATIENT)
Dept: FAMILY MEDICINE | Facility: CLINIC | Age: 58
End: 2023-05-19
Payer: COMMERCIAL

## 2023-05-19 RX ORDER — NITROFURANTOIN 25; 75 MG/1; MG/1
100 CAPSULE ORAL 2 TIMES DAILY
Qty: 14 CAPSULE | Refills: 0 | Status: SHIPPED | OUTPATIENT
Start: 2023-05-19 | End: 2023-07-06

## 2023-05-19 NOTE — TELEPHONE ENCOUNTER
----- Message from Anya Myrick sent at 5/19/2023  2:23 PM CDT -----  Contact: pt  Type:  Needs Medical Advice    Who Called: pt  Symptoms (please be specific): Possible UTI  Would the patient rather a call back or a response via [facility]? call  Best Call Back Number: [phone]  Additional Information: Pt states that she need a callback from the nurse as soon as possible. States that she feel like she has possible UTI and wants to know if she could come by and give a urine sample. Please advise thank you

## 2023-05-19 NOTE — TELEPHONE ENCOUNTER
Pt thinks she has UTI and is asking that Gerda please call in abx for her as she does not want to go to urgent care and we cannot do urine cx here.  Informed pt that Gerda said she will call in abx, however if her symptoms are not better by Monday she needs to let us know so we can get a urine sample.  Pt verbalizes understanding.

## 2023-06-02 ENCOUNTER — CLINICAL SUPPORT (OUTPATIENT)
Dept: FAMILY MEDICINE | Facility: CLINIC | Age: 58
End: 2023-06-02
Payer: COMMERCIAL

## 2023-06-02 ENCOUNTER — PATIENT MESSAGE (OUTPATIENT)
Dept: FAMILY MEDICINE | Facility: CLINIC | Age: 58
End: 2023-06-02

## 2023-06-02 ENCOUNTER — TELEPHONE (OUTPATIENT)
Dept: FAMILY MEDICINE | Facility: CLINIC | Age: 58
End: 2023-06-02

## 2023-06-02 DIAGNOSIS — R39.9 UTI SYMPTOMS: Primary | ICD-10-CM

## 2023-06-02 DIAGNOSIS — N39.0 RECURRENT UTI: Primary | ICD-10-CM

## 2023-06-02 LAB
BILIRUBIN, UA POC OHS: NEGATIVE
BLOOD, UA POC OHS: NEGATIVE
CLARITY, UA POC OHS: CLEAR
COLOR, UA POC OHS: YELLOW
GLUCOSE, UA POC OHS: NEGATIVE
KETONES, UA POC OHS: NEGATIVE
LEUKOCYTES, UA POC OHS: NEGATIVE
NITRITE, UA POC OHS: NEGATIVE
PH, UA POC OHS: 5.5
PROTEIN, UA POC OHS: NEGATIVE
SPECIFIC GRAVITY, UA POC OHS: 1.02
UROBILINOGEN, UA POC OHS: 0.2

## 2023-06-02 PROCEDURE — 81003 POCT URINALYSIS(INSTRUMENT): ICD-10-PCS | Mod: QW,S$GLB,, | Performed by: NURSE PRACTITIONER

## 2023-06-02 PROCEDURE — 87086 URINE CULTURE/COLONY COUNT: CPT | Performed by: NURSE PRACTITIONER

## 2023-06-02 PROCEDURE — 81003 URINALYSIS AUTO W/O SCOPE: CPT | Mod: QW,S$GLB,, | Performed by: NURSE PRACTITIONER

## 2023-06-02 NOTE — TELEPHONE ENCOUNTER
Spoke to pt and scheduled her nurse visit today to give urine sample.  Pt verbalizes understanding.

## 2023-06-02 NOTE — TELEPHONE ENCOUNTER
Pt states she completed the abx you gave her, but she is still having burning and frequent urination.  Do you want a visit/ e visit or just another urine sample?

## 2023-06-02 NOTE — TELEPHONE ENCOUNTER
----- Message from Anel Rogers sent at 6/1/2023  4:13 PM CDT -----  Name of Who is Calling: TRISHA CORONA [[id number]]       What is the request in detail: pt would like too speak with staff regarding an ongoing bladder infection. Please assist with this matter.        Can the clinic reply by [facility]:  no        What Number to Call Back if not in [facility]: [phone]

## 2023-06-02 NOTE — TELEPHONE ENCOUNTER
Let her know the urine sample was normal.  Will see what culture shows.  I put in referral for her to see Urology. Please schedule.

## 2023-06-02 NOTE — TELEPHONE ENCOUNTER
She can either come by and give urine sample or go to Urgent care.  I gave her antibiotics two weeks ago, if not improved then needs sample with culture.

## 2023-06-03 LAB
BACTERIA UR CULT: NORMAL
BACTERIA UR CULT: NORMAL

## 2023-06-05 ENCOUNTER — PATIENT MESSAGE (OUTPATIENT)
Dept: FAMILY MEDICINE | Facility: CLINIC | Age: 58
End: 2023-06-05
Payer: COMMERCIAL

## 2023-06-15 ENCOUNTER — OFFICE VISIT (OUTPATIENT)
Dept: UROLOGY | Facility: CLINIC | Age: 58
End: 2023-06-15
Payer: COMMERCIAL

## 2023-06-15 VITALS — BODY MASS INDEX: 29.62 KG/M2 | WEIGHT: 173.5 LBS | HEIGHT: 64 IN

## 2023-06-15 DIAGNOSIS — Z87.442 HISTORY OF KIDNEY STONES: Primary | ICD-10-CM

## 2023-06-15 DIAGNOSIS — R30.0 DYSURIA: ICD-10-CM

## 2023-06-15 LAB
BILIRUBIN, UA POC OHS: NEGATIVE
BLOOD, UA POC OHS: NEGATIVE
CLARITY, UA POC OHS: CLEAR
COLOR, UA POC OHS: YELLOW
GLUCOSE, UA POC OHS: NEGATIVE
KETONES, UA POC OHS: NEGATIVE
LEUKOCYTES, UA POC OHS: NEGATIVE
NITRITE, UA POC OHS: NEGATIVE
PH, UA POC OHS: 7
PROTEIN, UA POC OHS: NEGATIVE
SPECIFIC GRAVITY, UA POC OHS: 1.01
UROBILINOGEN, UA POC OHS: 0.2

## 2023-06-15 PROCEDURE — 81003 POCT URINALYSIS(INSTRUMENT): ICD-10-PCS | Mod: QW,S$GLB,, | Performed by: UROLOGY

## 2023-06-15 PROCEDURE — 1159F PR MEDICATION LIST DOCUMENTED IN MEDICAL RECORD: ICD-10-PCS | Mod: CPTII,S$GLB,, | Performed by: UROLOGY

## 2023-06-15 PROCEDURE — 3008F BODY MASS INDEX DOCD: CPT | Mod: CPTII,S$GLB,, | Performed by: UROLOGY

## 2023-06-15 PROCEDURE — 3008F PR BODY MASS INDEX (BMI) DOCUMENTED: ICD-10-PCS | Mod: CPTII,S$GLB,, | Performed by: UROLOGY

## 2023-06-15 PROCEDURE — 99999 PR PBB SHADOW E&M-EST. PATIENT-LVL III: CPT | Mod: PBBFAC,,, | Performed by: UROLOGY

## 2023-06-15 PROCEDURE — 99214 OFFICE O/P EST MOD 30 MIN: CPT | Mod: S$GLB,,, | Performed by: UROLOGY

## 2023-06-15 PROCEDURE — 81003 URINALYSIS AUTO W/O SCOPE: CPT | Mod: QW,S$GLB,, | Performed by: UROLOGY

## 2023-06-15 PROCEDURE — 1159F MED LIST DOCD IN RCRD: CPT | Mod: CPTII,S$GLB,, | Performed by: UROLOGY

## 2023-06-15 PROCEDURE — 99214 PR OFFICE/OUTPT VISIT, EST, LEVL IV, 30-39 MIN: ICD-10-PCS | Mod: S$GLB,,, | Performed by: UROLOGY

## 2023-06-15 PROCEDURE — 99999 PR PBB SHADOW E&M-EST. PATIENT-LVL III: ICD-10-PCS | Mod: PBBFAC,,, | Performed by: UROLOGY

## 2023-06-15 RX ORDER — FLUCONAZOLE 150 MG/1
150 TABLET ORAL ONCE
COMMUNITY
Start: 2023-06-14 | End: 2023-07-06

## 2023-06-15 NOTE — PROGRESS NOTES
Subjective:       Patient ID: Lavern Hodges is a 58 y.o. female.    Chief Complaint: Urinary Tract Infection    HPI    58-year-old complains intermittent dysuria.  Symptoms started last year.  She is been treated with multiple courses of antibiotics.  Her urine cultures have all been negative.  She just completed another course of antibiotics and she has persistent discomfort when she voids.  Her urinalysis is completely clear today.  She denies gross hematuria.  She has a very distant history of smoking but has not smoked since her 20s.  She does have a history of kidney stones and previous lithotripsy.  CT scan in December of 2022 was reviewed and there is a tiny nonobstructing stone in her right kidney.  Urine dipstick shows negative for all components.    Component       Urine Culture, Routine   Latest Ref Rng & Units          6/2/2023 Multiple organisms isolated. None in predominance.    11/30/2022 Multiple organisms isolated. None in predominance.     12/24/2018 No significant growth         Current Outpatient Medications:     calcium carbonate (TUMS) 200 mg calcium (500 mg) chewable tablet, Take 1 tablet by mouth daily as needed for Heartburn., Disp: , Rfl:     chlorhexidine (PERIDEX) 0.12 % solution, daily as needed., Disp: , Rfl:     cholecalciferol, vitamin D3, (VITAMIN D3) 25 mcg (1,000 unit) capsule, 2,000 Units., Disp: , Rfl:     esomeprazole (NEXIUM) 40 MG capsule, Take 1 capsule by mouth once daily, Disp: 90 capsule, Rfl: 1    fluconazole (DIFLUCAN) 150 MG Tab, Take 150 mg by mouth once., Disp: , Rfl:     hydrOXYzine HCL (ATARAX) 25 MG tablet, Take 1 tablet (25 mg total) by mouth 3 (three) times daily as needed for Itching., Disp: 21 tablet, Rfl: 0    nitrofurantoin, macrocrystal-monohydrate, (MACROBID) 100 MG capsule, Take 1 capsule (100 mg total) by mouth 2 (two) times daily. (Patient not taking: Reported on 6/15/2023), Disp: 14 capsule, Rfl: 0    VENTOLIN HFA 90 mcg/actuation inhaler, INHALE 2 PUFFS  BY MOUTH INTO THE LUNGS EVERY 6 HOURS AS NEEDED FOR WHEEZING, Disp: 18 g, Rfl: 2    Current Facility-Administered Medications:     triamcinolone acetonide injection 40 mg, 40 mg, Intramuscular, 1 time in Clinic/HOD, Ulysses Negro DPM      Review of Systems   Constitutional:  Negative for fever.   Genitourinary:  Positive for dysuria. Negative for frequency, hematuria and urgency.     Objective:      Physical Exam  Vitals reviewed.   Constitutional:       Appearance: She is well-developed.   Pulmonary:      Effort: Pulmonary effort is normal. No respiratory distress.   Skin:     General: Skin is warm.   Neurological:      Mental Status: She is alert and oriented to person, place, and time.   Psychiatric:         Behavior: Behavior normal.       Assessment:       1. Dysuria        Plan:       Dysuria  -     Ambulatory referral/consult to Urology  -     POCT Urinalysis(Instrument)  -     Cystoscopy; Future      Urine is clear and she has persistent symptoms.  Multiple cultures have been negative and she is been treated with courses of antibiotics.  I do not think she is ever had a urinary tract infection.  I recommend cystoscopy.     I personally reviewed the CT scan images and made an independent interpretation of the test completed by another healthcare professional.

## 2023-07-06 ENCOUNTER — PROCEDURE VISIT (OUTPATIENT)
Dept: UROLOGY | Facility: CLINIC | Age: 58
End: 2023-07-06
Payer: COMMERCIAL

## 2023-07-06 VITALS — HEIGHT: 64 IN | BODY MASS INDEX: 29.48 KG/M2 | WEIGHT: 172.69 LBS

## 2023-07-06 DIAGNOSIS — R30.0 DYSURIA: ICD-10-CM

## 2023-07-06 PROCEDURE — 52000 CYSTOSCOPY: ICD-10-PCS | Mod: S$GLB,,, | Performed by: UROLOGY

## 2023-07-06 PROCEDURE — 52000 CYSTOURETHROSCOPY: CPT | Mod: S$GLB,,, | Performed by: UROLOGY

## 2023-07-06 NOTE — PROCEDURES
Cystoscopy    Date/Time: 7/6/2023 3:30 PM  Performed by: VICKIE Cruz MD  Authorized by: VICKIE Cruz MD     Consent Done?:  Yes (Written)  Timeout: prior to procedure the correct patient, procedure, and site was verified    Prep: patient was prepped and draped in usual sterile fashion    Indications: dysuria    Position:  Other  Patient sedated?: No    Preparation: Patient was prepped and draped in usual sterile fashion    Scope type:  Flexible cystoscope   patient tolerated the procedure well with no immediate complications    Blood Loss:  None    The patients clinic history and physical were reviewed and there are no changes.    The patient was placed in the frog-leg position.  The genitals were prepped and draped in a sterile fashion.   Using a flexible scope, a careful cystoscopic exam was then performed.  The entire bladder mucosa was systematically visualized.  The mucosa appeared normal.  There were no lesions, masses foreign bodies or stones.   Each ureteral orifices were visualized and both had clear efflux of urine.  On retroflexion the bladder neck appeared normal.  The cystoscope was then removed and I examined the entire length of the urethra.  The urethra appeared normal.  She tolerated the procedure well.  There were no complications.    Impression:  Normal cystoscopy.

## 2023-07-12 ENCOUNTER — TELEPHONE (OUTPATIENT)
Dept: FAMILY MEDICINE | Facility: CLINIC | Age: 58
End: 2023-07-12

## 2023-07-12 ENCOUNTER — OFFICE VISIT (OUTPATIENT)
Dept: FAMILY MEDICINE | Facility: CLINIC | Age: 58
End: 2023-07-12
Payer: COMMERCIAL

## 2023-07-12 VITALS
WEIGHT: 172.63 LBS | DIASTOLIC BLOOD PRESSURE: 78 MMHG | OXYGEN SATURATION: 97 % | HEIGHT: 64 IN | SYSTOLIC BLOOD PRESSURE: 110 MMHG | HEART RATE: 103 BPM | TEMPERATURE: 98 F | BODY MASS INDEX: 29.47 KG/M2

## 2023-07-12 DIAGNOSIS — K44.9 HIATAL HERNIA: ICD-10-CM

## 2023-07-12 DIAGNOSIS — R12 HEARTBURN: ICD-10-CM

## 2023-07-12 DIAGNOSIS — K21.9 GASTROESOPHAGEAL REFLUX DISEASE, UNSPECIFIED WHETHER ESOPHAGITIS PRESENT: Primary | ICD-10-CM

## 2023-07-12 DIAGNOSIS — J02.9 SORE THROAT: ICD-10-CM

## 2023-07-12 PROCEDURE — 3008F PR BODY MASS INDEX (BMI) DOCUMENTED: ICD-10-PCS | Mod: CPTII,S$GLB,, | Performed by: NURSE PRACTITIONER

## 2023-07-12 PROCEDURE — 1159F PR MEDICATION LIST DOCUMENTED IN MEDICAL RECORD: ICD-10-PCS | Mod: CPTII,S$GLB,, | Performed by: NURSE PRACTITIONER

## 2023-07-12 PROCEDURE — 3074F PR MOST RECENT SYSTOLIC BLOOD PRESSURE < 130 MM HG: ICD-10-PCS | Mod: CPTII,S$GLB,, | Performed by: NURSE PRACTITIONER

## 2023-07-12 PROCEDURE — 1160F RVW MEDS BY RX/DR IN RCRD: CPT | Mod: CPTII,S$GLB,, | Performed by: NURSE PRACTITIONER

## 2023-07-12 PROCEDURE — 3078F DIAST BP <80 MM HG: CPT | Mod: CPTII,S$GLB,, | Performed by: NURSE PRACTITIONER

## 2023-07-12 PROCEDURE — 1160F PR REVIEW ALL MEDS BY PRESCRIBER/CLIN PHARMACIST DOCUMENTED: ICD-10-PCS | Mod: CPTII,S$GLB,, | Performed by: NURSE PRACTITIONER

## 2023-07-12 PROCEDURE — 3008F BODY MASS INDEX DOCD: CPT | Mod: CPTII,S$GLB,, | Performed by: NURSE PRACTITIONER

## 2023-07-12 PROCEDURE — 3078F PR MOST RECENT DIASTOLIC BLOOD PRESSURE < 80 MM HG: ICD-10-PCS | Mod: CPTII,S$GLB,, | Performed by: NURSE PRACTITIONER

## 2023-07-12 PROCEDURE — 99213 PR OFFICE/OUTPT VISIT, EST, LEVL III, 20-29 MIN: ICD-10-PCS | Mod: S$GLB,,, | Performed by: NURSE PRACTITIONER

## 2023-07-12 PROCEDURE — 3074F SYST BP LT 130 MM HG: CPT | Mod: CPTII,S$GLB,, | Performed by: NURSE PRACTITIONER

## 2023-07-12 PROCEDURE — 1159F MED LIST DOCD IN RCRD: CPT | Mod: CPTII,S$GLB,, | Performed by: NURSE PRACTITIONER

## 2023-07-12 PROCEDURE — 99213 OFFICE O/P EST LOW 20 MIN: CPT | Mod: S$GLB,,, | Performed by: NURSE PRACTITIONER

## 2023-07-12 RX ORDER — SUCRALFATE 1 G/1
1 TABLET ORAL 2 TIMES DAILY PRN
Qty: 60 TABLET | Refills: 0 | Status: SHIPPED | OUTPATIENT
Start: 2023-07-12 | End: 2023-08-21

## 2023-07-12 RX ORDER — DEXLANSOPRAZOLE 60 MG/1
60 CAPSULE, DELAYED RELEASE ORAL DAILY
Qty: 30 CAPSULE | Refills: 11 | Status: SHIPPED | OUTPATIENT
Start: 2023-07-12 | End: 2023-08-24 | Stop reason: SDUPTHER

## 2023-07-12 RX ORDER — SUCRALFATE 1 G/1
TABLET ORAL
Qty: 180 TABLET | OUTPATIENT
Start: 2023-07-12

## 2023-07-12 RX ORDER — DICYCLOMINE HYDROCHLORIDE 20 MG/1
TABLET ORAL
COMMUNITY
Start: 2023-06-22

## 2023-07-12 NOTE — TELEPHONE ENCOUNTER
----- Message from Marie Wynn sent at 7/12/2023  9:48 AM CDT -----  Contact: self  Type:  Needs Medical Advice    Who Called: Pt  Symptoms (please be specific):  Acid reflux   How long has patient had these symptoms:   a few days  Pharmacy name and phone #:     [id number] - [facility] [id number] [place] [address] AT [facility] [address]  [address] [place] [id number]  Phone: [phone] Fax: [phone]    Would the patient rather a call back or a response via [facility]?  call  Best Call Back Number: [phone]    Pt declined the 7/17 appt and would like someone from the [facility] to give her a call regarding her acid reflux...     Thank you...

## 2023-07-12 NOTE — PROGRESS NOTES
Subjective:       Patient ID: Lavern Hodges is a 58 y.o. female.    Chief Complaint: Gastroesophageal Reflux and Sore Throat    Gastroesophageal Reflux  She complains of a sore throat. She reports no abdominal pain, no chest pain, no coughing, no nausea or no wheezing. Pertinent negatives include no fatigue.   Sore Throat   Pertinent negatives include no abdominal pain, coughing, diarrhea, headaches, shortness of breath or vomiting.     States ate late last night. States woke up during the night with throat burning and acid in her throat. Feeling better today but throat still feels irritated. She has seen GI in the past, was given script for Dexilant but she did not get it filled, stayed on nexium. She want to try the Dexilant now.     See ROS    The following portion of the patients history was reviewed and updated as appropriate: allergies, current medications, past medical and surgical history. Past social history and problem list reviewed. Family PMH and Past social history reviewed. Tobacco, Illicit drug use reviewed.      Review of patient's allergies indicates:   Allergen Reactions    Cefaclor Hives    Codeine Hives         Current Outpatient Medications:     calcium carbonate (TUMS) 200 mg calcium (500 mg) chewable tablet, Take 1 tablet by mouth daily as needed for Heartburn., Disp: , Rfl:     cholecalciferol, vitamin D3, (VITAMIN D3) 25 mcg (1,000 unit) capsule, 2,000 Units., Disp: , Rfl:     dicyclomine (BENTYL) 20 mg tablet, , Disp: , Rfl:     esomeprazole (NEXIUM) 40 MG capsule, Take 1 capsule by mouth once daily, Disp: 90 capsule, Rfl: 1    chlorhexidine (PERIDEX) 0.12 % solution, daily as needed., Disp: , Rfl:     hydrOXYzine HCL (ATARAX) 25 MG tablet, Take 1 tablet (25 mg total) by mouth 3 (three) times daily as needed for Itching. (Patient not taking: Reported on 7/12/2023), Disp: 21 tablet, Rfl: 0    VENTOLIN HFA 90 mcg/actuation inhaler, INHALE 2 PUFFS BY MOUTH INTO THE LUNGS EVERY 6 HOURS AS NEEDED  FOR WHEEZING (Patient not taking: Reported on 2023), Disp: 18 g, Rfl: 2    Current Facility-Administered Medications:     triamcinolone acetonide injection 40 mg, 40 mg, Intramuscular, 1 time in Clinic/HOD, Ulysses Negro DPM    Past Medical History:   Diagnosis Date    Allergy     Anxiety     Asthma     due to allergies     GERD (gastroesophageal reflux disease)     History of nephrolithiasis     Irritable bowel syndrome     Kidney stone     lithotripsy 20 years ago    Peptic ulcer disease     PONV (postoperative nausea and vomiting)     Colin syndrome     Vitamin D deficiency        Past Surgical History:   Procedure Laterality Date     SECTION      times 2    COLONOSCOPY N/A 2016    Procedure: COLONOSCOPY;  Surgeon: Sherman Arevalo MD;  Location: [facility];  Service: Endoscopy;  Laterality: N/A; repeat in 10 years. clear.  internal/external hemorrhoids only.    ESOPHAGOGASTRODUODENOSCOPY  2016    Dr. Arevalo: Medium-sized hiatus hernia. a few gastric polyps biopsied; biopsy: Fundic gland polyps, no dysplasia    ESOPHAGOGASTRODUODENOSCOPY N/A 2021    Procedure: EGD (ESOPHAGOGASTRODUODENOSCOPY);  Surgeon: Sherman Arevalo MD;  Location: [facility] ENDO;  Service: Endoscopy;  Laterality: N/A;    HYSTEROSCOPY      TUBAL LIGATION      UPPER GASTROINTESTINAL ENDOSCOPY  10/29/2010    Dr. Arevalo: mil juanjotzki ring- dilated, gastric polyp removed, otherwise normal findings. repeat PRN    WISDOM TOOTH EXTRACTION         Social History     Socioeconomic History    Marital status:     Number of children: 2   Occupational History     Employer: [email or web address]   Tobacco Use    Smoking status: Former     Types: Cigarettes     Quit date: 8/3/1994     Years since quittin.9     Passive exposure: Past    Smokeless tobacco: Never   Substance and Sexual Activity    Alcohol use: No    Drug use: No    Sexual activity: Yes     Partners: Male     Birth control/protection: None, See  "Surgical Hx     Review of Systems   Constitutional:  Negative for fatigue and fever.   HENT:  Positive for rhinorrhea and sore throat.    Respiratory:  Negative for cough, chest tightness, shortness of breath and wheezing.    Cardiovascular:  Negative for chest pain and palpitations.   Gastrointestinal:  Negative for abdominal pain, diarrhea, nausea and vomiting.   Musculoskeletal:  Negative for arthralgias.   Neurological:  Negative for headaches.   Psychiatric/Behavioral:  Negative for dysphoric mood and sleep disturbance. The patient is not nervous/anxious.      Objective:      /78 (BP Location: Left arm, Patient Position: Sitting, BP Method: Medium (Manual))   Pulse 103   Temp 98.4 °F (36.9 °C)   Ht 5' 4" (1.626 m)   Wt 78.3 kg (172 lb 9.9 oz)   LMP 03/16/2021 (Approximate)   SpO2 97%   BMI 29.63 kg/m²      Physical Exam  Constitutional:       Appearance: Normal appearance. She is overweight.   HENT:      Head: Normocephalic.      Right Ear: Tympanic membrane, ear canal and external ear normal.      Left Ear: Tympanic membrane, ear canal and external ear normal.      Nose: Rhinorrhea present.      Right Sinus: No maxillary sinus tenderness.      Left Sinus: No maxillary sinus tenderness.      Mouth/Throat:      Pharynx: No oropharyngeal exudate or posterior oropharyngeal erythema.   Neck:      Thyroid: No thyromegaly.      Vascular: No carotid bruit.   Cardiovascular:      Rate and Rhythm: Normal rate and regular rhythm.      Pulses: Normal pulses.      Heart sounds: Normal heart sounds. No murmur heard.  Pulmonary:      Effort: Pulmonary effort is normal.      Breath sounds: Normal breath sounds. No wheezing.   Abdominal:      General: Bowel sounds are normal.      Tenderness: There is no abdominal tenderness.   Musculoskeletal:      Cervical back: Normal range of motion.      Right lower leg: No edema.      Left lower leg: No edema.      Comments: Gait normal.  strong, equal   Skin:     " General: Skin is warm and dry.      Capillary Refill: Capillary refill takes less than 2 seconds.   Neurological:      General: No focal deficit present.      Mental Status: She is alert.   Psychiatric:         Attention and Perception: Attention and perception normal.         Mood and Affect: Mood and affect normal.         Speech: Speech normal.         Behavior: Behavior normal.       Assessment:       1. Gastroesophageal reflux disease, unspecified whether esophagitis present    2. Sore throat    3. Heartburn    4. Hiatal hernia        Plan:       Gastroesophageal reflux disease, unspecified whether esophagitis present: change nexium to Dexilant. Will resend the prescription.    Sore throat: from the acid reflux.     Heartburn  -     dexlansoprazole (DEXILANT) 60 mg capsule; Take 1 capsule (60 mg total) by mouth once daily.  Dispense: 30 capsule; Refill: 11    Hiatal hernia: carafate prn. States if does not improved she might want to follow up on possible hernia repair.   -     dexlansoprazole (DEXILANT) 60 mg capsule; Take 1 capsule (60 mg total) by mouth once daily.  Dispense: 30 capsule; Refill: 11    Other orders  -     sucralfate (CARAFATE) 1 gram tablet; Take 1 tablet (1 g total) by mouth 2 (two) times daily as needed (acid reflux).  Dispense: 60 tablet; Refill: 0       Continue current medication  Take medications only as prescribed  Healthy diet, exercise  Adequate rest  Adequate hydration  Avoid allergens  Avoid excessive caffeine      Follow up if not improving

## 2023-07-14 ENCOUNTER — PATIENT MESSAGE (OUTPATIENT)
Dept: FAMILY MEDICINE | Facility: CLINIC | Age: 58
End: 2023-07-14
Payer: COMMERCIAL

## 2023-07-14 ENCOUNTER — TELEPHONE (OUTPATIENT)
Dept: FAMILY MEDICINE | Facility: CLINIC | Age: 58
End: 2023-07-14
Payer: COMMERCIAL

## 2023-07-14 RX ORDER — AZITHROMYCIN 250 MG/1
TABLET, FILM COATED ORAL
Qty: 6 TABLET | Refills: 0 | Status: SHIPPED | OUTPATIENT
Start: 2023-07-14 | End: 2023-07-19

## 2023-07-14 NOTE — TELEPHONE ENCOUNTER
Pt requested I do PA on dexilant in hopes of it being covered.    PA for dexilant completed via cover my meds  [id number]    Message from [facility]: Drug is not covered by plan

## 2023-07-14 NOTE — TELEPHONE ENCOUNTER
Pt wanted to let you know that she is still congested, coughing up greenish/yellowish phlegm and would like an abx called in to [facility] in [facility] please.  She has no fever.

## 2023-07-14 NOTE — TELEPHONE ENCOUNTER
----- Message from Isabella Cherry, Patient Care Assistant sent at 7/14/2023  8:50 AM CDT -----  Regarding: advice  Contact: pt  Type: Needs Medical Advice    Who Called:  pt     Symptoms (please be specific):  congestion     How long has patient had these symptoms:  3 days     Best Call Back Number: [phone] (home)     Additional Information: please call pt to advise. Thanks!

## 2023-07-17 ENCOUNTER — OFFICE VISIT (OUTPATIENT)
Dept: URGENT CARE | Facility: CLINIC | Age: 58
End: 2023-07-17
Payer: COMMERCIAL

## 2023-07-17 VITALS
RESPIRATION RATE: 18 BRPM | HEART RATE: 94 BPM | DIASTOLIC BLOOD PRESSURE: 78 MMHG | OXYGEN SATURATION: 99 % | BODY MASS INDEX: 29.37 KG/M2 | SYSTOLIC BLOOD PRESSURE: 129 MMHG | HEIGHT: 64 IN | TEMPERATURE: 98 F | WEIGHT: 172 LBS

## 2023-07-17 DIAGNOSIS — J06.9 UPPER RESPIRATORY INFECTION, VIRAL: ICD-10-CM

## 2023-07-17 DIAGNOSIS — R06.02 SHORTNESS OF BREATH: Primary | ICD-10-CM

## 2023-07-17 PROCEDURE — 99213 PR OFFICE/OUTPT VISIT, EST, LEVL III, 20-29 MIN: ICD-10-PCS | Mod: S$GLB,,, | Performed by: PHYSICIAN ASSISTANT

## 2023-07-17 PROCEDURE — 99213 OFFICE O/P EST LOW 20 MIN: CPT | Mod: S$GLB,,, | Performed by: PHYSICIAN ASSISTANT

## 2023-07-17 RX ORDER — ALBUTEROL SULFATE 90 UG/1
2 AEROSOL, METERED RESPIRATORY (INHALATION) EVERY 6 HOURS PRN
Qty: 18 G | Refills: 0 | Status: SHIPPED | OUTPATIENT
Start: 2023-07-17

## 2023-07-17 RX ORDER — IPRATROPIUM BROMIDE 21 UG/1
2 SPRAY, METERED NASAL 3 TIMES DAILY
Qty: 30 ML | Refills: 0 | Status: SHIPPED | OUTPATIENT
Start: 2023-07-17 | End: 2023-08-24

## 2023-07-17 RX ORDER — BENZONATATE 200 MG/1
200 CAPSULE ORAL 3 TIMES DAILY PRN
Qty: 30 CAPSULE | Refills: 0 | Status: SHIPPED | OUTPATIENT
Start: 2023-07-17 | End: 2023-07-27

## 2023-07-18 ENCOUNTER — TELEPHONE (OUTPATIENT)
Dept: FAMILY MEDICINE | Facility: CLINIC | Age: 58
End: 2023-07-18
Payer: COMMERCIAL

## 2023-07-18 NOTE — PROGRESS NOTES
"Subjective:      Patient ID: Lavern Hodges is a 58 y.o. female.    Vitals:  height is 5' 4" (1.626 m) and weight is 78 kg (172 lb). Her oral temperature is 97.8 °F (36.6 °C). Her blood pressure is 129/78 and her pulse is 94. Her respiration is 18 and oxygen saturation is 99%.     Chief Complaint: Shortness of Breath    Pt presents today with c/o sob since yesterday. Pt states that she contacted her primary dr for refill on inhaler but could not reach her. Pt states that the inhaler is not anything she use on a regular, it has been approx a year since last use. Pt has not taken anything for symptoms. Pain level 0/10.    Shortness of Breath  This is a new problem. The current episode started yesterday. The problem occurs constantly. The problem has been unchanged. Associated symptoms include sputum production and wheezing. Pertinent negatives include no abdominal pain, chest pain, ear pain, fever, headaches, hemoptysis, leg swelling, neck pain, rash, sore throat, syncope or vomiting. Nothing aggravates the symptoms. She has tried nothing for the symptoms. The treatment provided no relief. Her past medical history is significant for allergies and asthma.     Constitution: Negative for chills, sweating, fatigue and fever.   HENT:  Positive for congestion, sinus pain and sinus pressure. Negative for ear pain, drooling, sore throat, trouble swallowing and voice change.    Neck: Negative for neck pain, neck stiffness, painful lymph nodes and neck swelling.   Cardiovascular:  Negative for chest pain, leg swelling, palpitations, sob on exertion and passing out.   Eyes:  Negative for eye pain, eye redness, photophobia, double vision, blurred vision and eyelid swelling.   Respiratory:  Positive for sputum production, shortness of breath and wheezing. Negative for chest tightness, cough, bloody sputum and stridor.    Gastrointestinal:  Negative for abdominal pain, abdominal bloating, nausea, vomiting, constipation, diarrhea and " heartburn.   Musculoskeletal:  Negative for joint pain, joint swelling, abnormal ROM of joint, back pain, muscle cramps and muscle ache.   Skin:  Negative for rash and hives.   Allergic/Immunologic: Negative for seasonal allergies, food allergies, hives, itching and sneezing.   Neurological:  Negative for dizziness, light-headedness, passing out, loss of balance, headaches, altered mental status, loss of consciousness and seizures.   Hematologic/Lymphatic: Negative for swollen lymph nodes.   Psychiatric/Behavioral:  Negative for altered mental status and nervous/anxious. The patient is not nervous/anxious.     Objective:     Physical Exam   Constitutional: She is oriented to person, place, and time. She appears well-developed. She is cooperative.  Non-toxic appearance. She does not appear ill. No distress.   HENT:   Head: Normocephalic and atraumatic.   Ears:   Right Ear: Hearing, tympanic membrane, external ear and ear canal normal.   Left Ear: Hearing, tympanic membrane, external ear and ear canal normal.   Nose: Mucosal edema and rhinorrhea present. No nasal deformity. No epistaxis. Right sinus exhibits no maxillary sinus tenderness and no frontal sinus tenderness. Left sinus exhibits no maxillary sinus tenderness and no frontal sinus tenderness.   Mouth/Throat: Uvula is midline and mucous membranes are normal. No trismus in the jaw. Normal dentition. No uvula swelling. Posterior oropharyngeal erythema and cobblestoning present. No oropharyngeal exudate, posterior oropharyngeal edema or tonsillar abscesses. No tonsillar exudate.   Eyes: Conjunctivae and lids are normal. No scleral icterus.   Neck: Trachea normal and phonation normal. Neck supple. No edema present. No erythema present. No neck rigidity present.   Cardiovascular: Normal rate, regular rhythm, normal heart sounds and normal pulses.   Pulmonary/Chest: Effort normal and breath sounds normal. No accessory muscle usage or stridor. No respiratory  distress. She has no decreased breath sounds. She has no wheezes. She has no rhonchi. She has no rales.   Abdominal: Normal appearance.   Musculoskeletal: Normal range of motion.         General: No deformity. Normal range of motion.   Lymphadenopathy:     She has no cervical adenopathy.   Neurological: She is alert and oriented to person, place, and time. She exhibits normal muscle tone. Coordination normal.   Skin: Skin is warm, dry, intact, not diaphoretic, not pale and no rash. Capillary refill takes less than 2 seconds.   Psychiatric: Her speech is normal and behavior is normal. Judgment and thought content normal.   Nursing note and vitals reviewed.    Assessment:     1. Shortness of breath    2. Upper respiratory infection, viral        Plan:       Shortness of breath  -     Cancel: XR CHEST PA AND LATERAL; Future; Expected date: 07/17/2023    Upper respiratory infection, viral    Other orders  -     albuterol (VENTOLIN HFA) 90 mcg/actuation inhaler; Inhale 2 puffs into the lungs every 6 (six) hours as needed for Wheezing or Shortness of Breath. Rescue  Dispense: 18 g; Refill: 0  -     benzonatate (TESSALON) 200 MG capsule; Take 1 capsule (200 mg total) by mouth 3 (three) times daily as needed for Cough.  Dispense: 30 capsule; Refill: 0  -     ipratropium (ATROVENT) 21 mcg (0.03 %) nasal spray; 2 sprays by Each Nostril route 3 (three) times daily.  Dispense: 30 mL; Refill: 0      Patient Instructions     You must understand that you've received an Urgent Care treatment only and that you may be released before all your medical problems are known or treated. You, the patient, will arrange for follow up care as instructed.  Follow up with your PCP or specialty clinic as directed if not improved or as needed. You can call [phone] to schedule an appointment with the appropriate provider.  If your condition worsens we recommend that you receive another evaluation at the Emergency Department for any concerns or  worsening of condition.  Patient aware and verbalized understanding.

## 2023-07-18 NOTE — PATIENT INSTRUCTIONS
You must understand that you've received an Urgent Care treatment only and that you may be released before all your medical problems are known or treated. You, the patient, will arrange for follow up care as instructed.  Follow up with your PCP or specialty clinic as directed if not improved or as needed. You can call [phone] to schedule an appointment with the appropriate provider.  If your condition worsens we recommend that you receive another evaluation at the Emergency Department for any concerns or worsening of condition.  Patient aware and verbalized understanding.

## 2023-07-18 NOTE — TELEPHONE ENCOUNTER
----- Message from Isabela Win sent at 7/17/2023  3:25 PM CDT -----  Regarding: Needs refill  Type: Needs Medical Advice  Who Called:  Lavern    [place] Call Back Number: [phone]    Additional Information: pt would like a refill on her albuterol she still has a cough

## 2023-07-19 ENCOUNTER — TELEPHONE (OUTPATIENT)
Dept: FAMILY MEDICINE | Facility: CLINIC | Age: 58
End: 2023-07-19
Payer: COMMERCIAL

## 2023-07-19 NOTE — TELEPHONE ENCOUNTER
----- Message from Marilee Singh sent at 7/18/2023  3:50 PM CDT -----  Type: Needs Medical Advice  Who Called:  pt     Best Call Back Number: [phone]    Additional Information: pt states she wants a call back regarding mychart msge please advise

## 2023-07-31 ENCOUNTER — TELEPHONE (OUTPATIENT)
Dept: GASTROENTEROLOGY | Facility: CLINIC | Age: 58
End: 2023-07-31
Payer: COMMERCIAL

## 2023-07-31 NOTE — TELEPHONE ENCOUNTER
----- Message from Jaqueline Wei sent at 7/31/2023  2:07 PM CDT -----  Type:  Sooner Appointment Request    Caller is requesting a sooner appointment.  Caller declined first available appointment listed below.  Caller will not accept being placed on the waitlist and is requesting a message be sent to doctor.    Name of Caller: Pt    When is the first available appointment? October with Jolly    Symptoms: Acid reflux f/u    Would the patient rather a call back or a response via [facility]? yes    Best Call Back Number:[phone]    Additional Information: Pt is trying to get sooner appt with either Dr. Arevalo or CHELLE Azevedo.  Please call back to advise. Thanks!

## 2023-08-21 DIAGNOSIS — K21.00 GASTROESOPHAGEAL REFLUX DISEASE WITH ESOPHAGITIS WITHOUT HEMORRHAGE: Primary | ICD-10-CM

## 2023-08-21 RX ORDER — SUCRALFATE 1 G/1
TABLET ORAL
Qty: 60 TABLET | Refills: 0 | Status: SHIPPED | OUTPATIENT
Start: 2023-08-21 | End: 2023-08-21

## 2023-08-21 RX ORDER — SUCRALFATE 1 G/1
TABLET ORAL
Qty: 180 TABLET | Refills: 1 | Status: SHIPPED | OUTPATIENT
Start: 2023-08-21

## 2023-08-24 ENCOUNTER — OFFICE VISIT (OUTPATIENT)
Dept: GASTROENTEROLOGY | Facility: CLINIC | Age: 58
End: 2023-08-24
Payer: COMMERCIAL

## 2023-08-24 ENCOUNTER — LAB VISIT (OUTPATIENT)
Dept: LAB | Facility: HOSPITAL | Age: 58
End: 2023-08-24
Payer: COMMERCIAL

## 2023-08-24 VITALS — BODY MASS INDEX: 29.17 KG/M2 | HEIGHT: 64 IN | WEIGHT: 170.88 LBS

## 2023-08-24 DIAGNOSIS — Z87.19 HISTORY OF IBS: ICD-10-CM

## 2023-08-24 DIAGNOSIS — R12 HEARTBURN: ICD-10-CM

## 2023-08-24 DIAGNOSIS — Z12.11 SCREENING FOR COLON CANCER: ICD-10-CM

## 2023-08-24 DIAGNOSIS — Z79.899 LONG-TERM CURRENT USE OF PROTON PUMP INHIBITOR THERAPY: ICD-10-CM

## 2023-08-24 DIAGNOSIS — K21.9 GASTROESOPHAGEAL REFLUX DISEASE, UNSPECIFIED WHETHER ESOPHAGITIS PRESENT: Primary | ICD-10-CM

## 2023-08-24 DIAGNOSIS — Z87.19 HISTORY OF GASTRITIS: ICD-10-CM

## 2023-08-24 DIAGNOSIS — K44.9 HIATAL HERNIA: ICD-10-CM

## 2023-08-24 DIAGNOSIS — R10.13 EPIGASTRIC DISCOMFORT: ICD-10-CM

## 2023-08-24 LAB
MAGNESIUM SERPL-MCNC: 2 MG/DL (ref 1.6–2.6)
VIT B12 SERPL-MCNC: 170 PG/ML (ref 210–950)

## 2023-08-24 PROCEDURE — 1160F PR REVIEW ALL MEDS BY PRESCRIBER/CLIN PHARMACIST DOCUMENTED: ICD-10-PCS | Mod: CPTII,S$GLB,,

## 2023-08-24 PROCEDURE — 36415 COLL VENOUS BLD VENIPUNCTURE: CPT | Mod: PO

## 2023-08-24 PROCEDURE — 1159F PR MEDICATION LIST DOCUMENTED IN MEDICAL RECORD: ICD-10-PCS | Mod: CPTII,S$GLB,,

## 2023-08-24 PROCEDURE — 83735 ASSAY OF MAGNESIUM: CPT

## 2023-08-24 PROCEDURE — 99999 PR PBB SHADOW E&M-EST. PATIENT-LVL III: CPT | Mod: PBBFAC,,,

## 2023-08-24 PROCEDURE — 3008F PR BODY MASS INDEX (BMI) DOCUMENTED: ICD-10-PCS | Mod: CPTII,S$GLB,,

## 2023-08-24 PROCEDURE — 99214 PR OFFICE/OUTPT VISIT, EST, LEVL IV, 30-39 MIN: ICD-10-PCS | Mod: S$GLB,,,

## 2023-08-24 PROCEDURE — 3008F BODY MASS INDEX DOCD: CPT | Mod: CPTII,S$GLB,,

## 2023-08-24 PROCEDURE — 1159F MED LIST DOCD IN RCRD: CPT | Mod: CPTII,S$GLB,,

## 2023-08-24 PROCEDURE — 99214 OFFICE O/P EST MOD 30 MIN: CPT | Mod: S$GLB,,,

## 2023-08-24 PROCEDURE — 1160F RVW MEDS BY RX/DR IN RCRD: CPT | Mod: CPTII,S$GLB,,

## 2023-08-24 PROCEDURE — 99999 PR PBB SHADOW E&M-EST. PATIENT-LVL III: ICD-10-PCS | Mod: PBBFAC,,,

## 2023-08-24 PROCEDURE — 82607 VITAMIN B-12: CPT

## 2023-08-24 RX ORDER — ESOMEPRAZOLE MAGNESIUM 40 MG/1
40 CAPSULE, DELAYED RELEASE ORAL
COMMUNITY
Start: 2023-08-20 | End: 2023-08-24 | Stop reason: ALTCHOICE

## 2023-08-24 RX ORDER — FAMOTIDINE 40 MG/1
40 TABLET, FILM COATED ORAL NIGHTLY PRN
Qty: 90 TABLET | Refills: 0 | Status: SHIPPED | OUTPATIENT
Start: 2023-08-24 | End: 2023-11-14

## 2023-08-24 RX ORDER — DEXLANSOPRAZOLE 60 MG/1
60 CAPSULE, DELAYED RELEASE ORAL DAILY
Qty: 30 CAPSULE | Refills: 2 | Status: SHIPPED | OUTPATIENT
Start: 2023-08-24 | End: 2023-12-01 | Stop reason: SDUPTHER

## 2023-08-24 NOTE — PROGRESS NOTES
Subjective:       Patient ID: Lavern Hodges is a 58 y.o. female Body mass index is 29.33 kg/m².    Chief Complaint: Gastroesophageal Reflux    This patient is new to me.  Established patient of Dr. Arevalo & Asya Azevedo NP.     Gastroesophageal Reflux  She complains of abdominal pain (Denies pain currently; reports intermittent generalized abdominal cramping she attributes to IBS; takes Bentyl p.r.n. with improvement; she also reports intermittent epigastric burning), belching (frequent), globus sensation (Reports some congestion and frequent throat clearing), heartburn (Well-controlled with diet, lifestyle changes, and PPI; takes Tums p.r.n. for breakthrough), a sore throat (Intermittent burning sensation in her throat after episodes of regurgitation) and water brash. She reports no chest pain, no choking, no coughing, no dysphagia, no early satiety, no hoarse voice or no nausea. CHIEF COMPLAINT:  Patient expresses concern with intermittent and infrequent episodes of regurgitation especially at night; she is worried about possible aspiration; last episode occurred 07/12/2023 after eating a sandwich closer to bedtime than usual. This is a chronic problem. The current episode started more than 1 year ago. The problem occurs occasionally. The problem has been gradually improving. The symptoms are aggravated by lying down, certain foods and stress (Spicy, fried foods, and red sauce; late night eating). Pertinent negatives include no anemia, fatigue, melena, muscle weakness or weight loss. Also reports passing flatus in the morning. Risk factors include hiatal hernia, caffeine use and NSAIDs (Drinks cokes and takes ibuprofen p.r.n.). She has tried head elevation, a PPI, a diet change and an antacid (Currently taking Nexium 40 mg once daily & Tums p.r.n. weekly; patient waiting for prior authorization to get Dexilant;  PAST TREATMENTS: prilosec, protonix (ineffective), aciphex (mild relief)) for the symptoms. The  treatment provided moderate relief. Past procedures include an EGD (07/23/21 - Normal esophagus. Biopsied. Hiatal hernia. Gastritis. Biopsied. A few gastric polyps. Biopsied. Normal examined duodenum; patho: benign, no bacteria). Past procedures do not include an abdominal ultrasound, esophageal manometry, esophageal pH monitoring, H. pylori antibody titer or a UGI. Past invasive treatments do not include gastroplasty, gastroplication or reflux surgery.     Review of Systems   Constitutional:  Negative for activity change, appetite change, chills, diaphoresis, fatigue, fever, unexpected weight change and weight loss.   HENT:  Positive for sore throat (Intermittent burning sensation in her throat after episodes of regurgitation). Negative for hoarse voice and trouble swallowing.    Respiratory:  Negative for cough, choking and shortness of breath.    Cardiovascular:  Negative for chest pain.   Gastrointestinal:  Positive for abdominal pain (Denies pain currently; reports intermittent generalized abdominal cramping she attributes to IBS; takes Bentyl p.r.n. with improvement; she also reports intermittent epigastric burning) and heartburn (Well-controlled with diet, lifestyle changes, and PPI; takes Tums p.r.n. for breakthrough). Negative for abdominal distention, anal bleeding, blood in stool, constipation, diarrhea (Currently having 1-2 BMs daily rated 4 on [place] scale), dysphagia, melena, nausea, rectal pain and vomiting.   Musculoskeletal:  Negative for muscle weakness.       Patient's last menstrual period was 03/16/2021 (approximate).  Past Medical History:   Diagnosis Date    Allergy     Anxiety     Asthma     due to allergies     GERD (gastroesophageal reflux disease)     History of nephrolithiasis     Irritable bowel syndrome     Kidney stone     lithotripsy 20 years ago    Peptic ulcer disease     PONV (postoperative nausea and vomiting)     Colin syndrome     Vitamin D deficiency      Past Surgical  History:   Procedure Laterality Date     SECTION      times 2    COLONOSCOPY N/A 2016    Procedure: COLONOSCOPY;  Surgeon: Sherman Arevalo MD;  Location: [facility] ENDO;  Service: Endoscopy;  Laterality: N/A; repeat in 10 years. clear.  internal/external hemorrhoids only.    ESOPHAGOGASTRODUODENOSCOPY  2016    Dr. Arevalo: Medium-sized hiatus hernia. a few gastric polyps biopsied; biopsy: Fundic gland polyps, no dysplasia    ESOPHAGOGASTRODUODENOSCOPY N/A 2021    Procedure: EGD (ESOPHAGOGASTRODUODENOSCOPY);  Surgeon: Sherman Arevalo MD;  Location: [facility] ENDO;  Service: Endoscopy;  Laterality: N/A;    HYSTEROSCOPY      TUBAL LIGATION      UPPER GASTROINTESTINAL ENDOSCOPY  10/29/2010    Dr. Arevalo: mil schatzki ring- dilated, gastric polyp removed, otherwise normal findings. repeat PRN    WISDOM TOOTH EXTRACTION       Family History   Problem Relation Age of Onset    Ovarian cancer Maternal Aunt     Colon cancer Maternal Aunt         diagnosed in her 70's    Alzheimer's disease Maternal Grandmother     Crohn's disease Neg Hx     Ulcerative colitis Neg Hx     Esophageal cancer Neg Hx     Stomach cancer Neg Hx      Social History     Tobacco Use    Smoking status: Former     Current packs/day: 0.00     Types: Cigarettes     Quit date: 8/3/1994     Years since quittin.0     Passive exposure: Past    Smokeless tobacco: Never   Substance Use Topics    Alcohol use: No    Drug use: No     Wt Readings from Last 10 Encounters:   23 77.5 kg (170 lb 13.7 oz)   23 78 kg (172 lb)   23 78.3 kg (172 lb 9.9 oz)   23 78.3 kg (172 lb 11.2 oz)   06/15/23 78.7 kg (173 lb 8 oz)   23 74.8 kg (164 lb 14.5 oz)   22 74.8 kg (165 lb)   22 75.1 kg (165 lb 10.8 oz)   22 77.4 kg (170 lb 10.2 oz)   22 77.9 kg (171 lb 11.8 oz)     Lab Results   Component Value Date    WBC 4.03 2022    HGB 14.4 2022    HCT 43.5 2022    MCV 93 2022    PLT  209 12/19/2022     CMP  Sodium   Date Value Ref Range Status   12/19/2022 142 136 - 145 mmol/L Final     Potassium   Date Value Ref Range Status   12/19/2022 3.7 3.5 - 5.1 mmol/L Final     Chloride   Date Value Ref Range Status   12/19/2022 107 95 - 110 mmol/L Final     CO2   Date Value Ref Range Status   12/19/2022 25 23 - 29 mmol/L Final     Glucose   Date Value Ref Range Status   12/19/2022 88 70 - 110 mg/dL Final     BUN   Date Value Ref Range Status   12/19/2022 11 6 - 20 mg/dL Final     Creatinine   Date Value Ref Range Status   12/19/2022 0.9 0.5 - 1.4 mg/dL Final     Calcium   Date Value Ref Range Status   12/19/2022 9.2 8.7 - 10.5 mg/dL Final     Total Protein   Date Value Ref Range Status   12/19/2022 6.9 6.0 - 8.4 g/dL Final     Albumin   Date Value Ref Range Status   12/19/2022 4.0 3.5 - 5.2 g/dL Final     Total Bilirubin   Date Value Ref Range Status   12/19/2022 0.8 0.1 - 1.0 mg/dL Final     Comment:     For infants and newborns, interpretation of results should be based  on gestational age, weight and in agreement with clinical  observations.    Premature Infant recommended reference ranges:  Up to 24 hours.............<8.0 mg/dL  Up to 48 hours............<12.0 mg/dL  3-5 days..................<15.0 mg/dL  6-29 days.................<15.0 mg/dL       Alkaline Phosphatase   Date Value Ref Range Status   12/19/2022 73 55 - 135 U/L Final     AST   Date Value Ref Range Status   12/19/2022 17 10 - 40 U/L Final     ALT   Date Value Ref Range Status   12/19/2022 16 10 - 44 U/L Final     Anion Gap   Date Value Ref Range Status   12/19/2022 10 8 - 16 mmol/L Final     eGFR if    Date Value Ref Range Status   12/09/2021 >60.0 >60 mL/min/1.73 m^2 Final     eGFR if non    Date Value Ref Range Status   12/09/2021 >60.0 >60 mL/min/1.73 m^2 Final     Comment:     Calculation used to obtain the estimated glomerular filtration  rate (eGFR) is the CKD-EPI equation.        Lab Results    Component Value Date    TSH 2.178 12/19/2022     Reviewed prior medical records including office visit with Asya Azevedo NP 06/03/2021, radiology report of KUB 06/23/2021, CT renal stone study 12/03/2022, chest x-ray 06/03/2021 & endoscopy history (see surgical history).    Objective:      Physical Exam  Vitals and nursing note reviewed.   Constitutional:       General: She is not in acute distress.     Appearance: Normal appearance. She is not ill-appearing.   HENT:      Mouth/Throat:      Lips: Pink. No lesions.   Cardiovascular:      Rate and Rhythm: Normal rate.      Pulses: Normal pulses.      Heart sounds: Normal heart sounds.   Pulmonary:      Effort: Pulmonary effort is normal. No respiratory distress.      Breath sounds: Normal breath sounds.   Abdominal:      General: Abdomen is flat. Bowel sounds are normal. There is no distension or abdominal bruit. There are no signs of injury.      Palpations: Abdomen is soft. There is no shifting dullness, fluid wave, hepatomegaly, splenomegaly or mass.      Tenderness: There is abdominal tenderness (mild) in the epigastric area. There is no guarding or rebound. Negative signs include Minor's sign, Rovsing's sign and McBurney's sign.   Skin:     General: Skin is warm and dry.      Coloration: Skin is not jaundiced or pale.   Neurological:      Mental Status: She is alert and oriented to person, place, and time.   Psychiatric:         Attention and Perception: Attention normal.         Mood and Affect: Mood normal.         Speech: Speech normal.         Behavior: Behavior normal.         Assessment:       1. Gastroesophageal reflux disease, unspecified whether esophagitis present    2. Heartburn    3. Epigastric discomfort    4. History of gastritis    5. Hiatal hernia    6. Long-term current use of proton pump inhibitor therapy    7. History of IBS    8. Screening for colon cancer        Plan:       Gastroesophageal reflux disease, unspecified whether esophagitis  present & Heartburn  -Avoid large meals, avoid eating within 2-3 hours of bedtime (avoid late night eating & lying down soon after eating), elevate head of bed if nocturnal symptoms are present, smoking cessation (if current smoker), & weight loss (if overweight).   -Avoid known foods which trigger reflux symptoms & to minimize/avoid high-fat foods, chocolate, caffeine, citrus, alcohol, & tomato products.  -Avoid/limit use of NSAID's, since they can cause GI upset, bleeding, and/or ulcers. If needed, take with food.  -     FL Esophagram Complete; Future; Expected date: 08/24/2023  -START: dexlansoprazole (DEXILANT) 60 mg capsule; Take 1 capsule (60 mg total) by mouth once daily.  Dispense: 30 capsule; Refill: 2  - START: famotidine (PEPCID) 40 MG tablet; Take 1 tablet (40 mg total) by mouth nightly as needed for Heartburn.  Dispense: 90 tablet; Refill: 0    Epigastric discomfort & History of gastritis   -Avoid/limit use of NSAID's, since they can cause GI upset, bleeding, and/or ulcers. If needed, take with food.  -recommend starting Carafate 1 g 4 times daily (before meals and nightly) for 10 days    Hiatal hernia  -discussed diagnosis with patient & that it is usually managed by controlling reflux symptoms, surgery is an option, but usually performed if reflux is uncontrolled by medication management and lifestyle/dietary modifications; if symptoms persist despite medication management and lifestyle/dietary modifications, we can refer to general surgery to consult about surgical options, patient verbalized understanding  -CHANGE: dexlansoprazole (DEXILANT) 60 mg capsule; Take 1 capsule (60 mg total) by mouth once daily.  Dispense: 30 capsule; Refill: 2    Long-term current use of proton pump inhibitor therapy  - preference to use lowest effective dose or discontinuing if possible  - recommend a diet high in calcium and/or taking OTC calcium and vitamin d supplements as directed (such as Citracal +D)  - recommend  annual monitoring with blood work to include CMP, CBC, vitamin B12, and magnesium.  -     Magnesium; Future; Expected date: 08/24/2023  -     Vitamin B12; Future; Expected date: 08/24/2023    History of IBS  - recommend OTC probiotic, such as Florastor or Culturelle, taken as directed on packaging  - avoid lactose, alcohol, & caffeine  - avoid known triggers  - continue Bentyl as prescribed  - consider low FODMAP diet    Screening for colon cancer  -follow-up for surveillance colonoscopy 11/2026    Follow up in about 4 weeks (around 9/21/2023), or if symptoms worsen or fail to improve.      If no improvement in symptoms or symptoms worsen, call/follow-up at clinic or go to ER.        30 minutes of total time spent on the encounter, which includes face to face time and non-face to face time preparing to see the patient (eg, review of tests), Obtaining and/or reviewing separately obtained history, Documenting clinical information in the electronic or other health record, Independently interpreting results (not separately reported) and communicating results to the patient/family/caregiver, or Care coordination (not separately reported).     A dictation software program was used for this note. Please expect some simple typographical  errors in this note.

## 2023-08-31 ENCOUNTER — TELEPHONE (OUTPATIENT)
Dept: FAMILY MEDICINE | Facility: CLINIC | Age: 58
End: 2023-08-31
Payer: COMMERCIAL

## 2023-08-31 NOTE — TELEPHONE ENCOUNTER
She can take a B12 supplement without worry. She should buy the Sublingual tablets. ( Dissolve under the tongue ). The absorption is better with the SL pills.

## 2023-08-31 NOTE — TELEPHONE ENCOUNTER
----- Message from Sonja Godoy sent at 8/30/2023  1:36 PM CDT -----  Type: Needs Medical Advice  Who Called:  pt  Best Call Back Number: [phone]  Additional Information: pt is calling the office to speak with the nurse in regards to her recent lab work for Gastro. Please call back to advise Thanks!

## 2023-08-31 NOTE — TELEPHONE ENCOUNTER
Pt wants to report low b12, pt does not take any vitamin supplements at this time.  She is not sure if po supplements would work with other medications.  Please advise regarding best therapy.

## 2023-09-07 ENCOUNTER — TELEPHONE (OUTPATIENT)
Dept: GASTROENTEROLOGY | Facility: CLINIC | Age: 58
End: 2023-09-07
Payer: COMMERCIAL

## 2023-09-07 NOTE — TELEPHONE ENCOUNTER
Returned call to patient to assist. Patient has questions about possible interaction between contrast and current medications. She also has question concerning recent esophagram. Message forwarded to [facility] gastro staff.

## 2023-09-07 NOTE — TELEPHONE ENCOUNTER
----- Message from Gris Chong sent at 9/7/2023 10:04 AM CDT -----  Type: Needs Medical Advice  Who Called:  pt  Best Call Back Number: [phone]  Additional Information: pt had a esophargram done and she wants to know if she can take her other medications with the liquid she was told to drink, pl call laure wyatt

## 2023-09-10 DIAGNOSIS — R93.3 ABNORMAL ESOPHAGRAM: Primary | ICD-10-CM

## 2023-09-11 ENCOUNTER — PATIENT MESSAGE (OUTPATIENT)
Dept: FAMILY MEDICINE | Facility: CLINIC | Age: 58
End: 2023-09-11
Payer: COMMERCIAL

## 2023-09-11 ENCOUNTER — TELEPHONE (OUTPATIENT)
Dept: FAMILY MEDICINE | Facility: CLINIC | Age: 58
End: 2023-09-11
Payer: COMMERCIAL

## 2023-09-18 ENCOUNTER — TELEPHONE (OUTPATIENT)
Dept: GASTROENTEROLOGY | Facility: CLINIC | Age: 58
End: 2023-09-18
Payer: COMMERCIAL

## 2023-09-18 NOTE — TELEPHONE ENCOUNTER
----- Message from Merychidi Ney sent at 9/18/2023  1:22 PM CDT -----  Regarding: several questions  Needs Medical Advice      Who Called: PT      Pharmacy name and phone #:      [id number] - [place] - [address]   [place] [address]  Phone: [phone] Fax: [phone]      Would the patient rather a call back or a response via [facility]? Call back    Best Call Back Number: [phone]      Additional Information: Sts she was told the pharm sent something to the office about getting approved for her meds for the authorization.  She wanted to know the status of it.  Also she was wanting to know about other test that could be needed.  Would like to talk to someone.  Please Advise - Thank you

## 2023-09-18 NOTE — TELEPHONE ENCOUNTER
Call placed to Ms. Hodges in regards to message received. Question answered on manometry ordered, and advised I have to contact [employer] in regards to her medication as it says that medication is not covered under her plan.

## 2023-09-21 ENCOUNTER — TELEPHONE (OUTPATIENT)
Dept: GASTROENTEROLOGY | Facility: CLINIC | Age: 58
End: 2023-09-21
Payer: COMMERCIAL

## 2023-09-21 NOTE — TELEPHONE ENCOUNTER
----- Message from Arabella Sullivan NP sent at 9/20/2023  3:32 PM CDT -----  Please let the patient know unfortunately her insurance will not cover Dexilant. I recommend her continue Nexium and Pepcid if the combination has been effective.  Sincerely,  Arabella Sullivan NP  ----- Message -----  From: Rossy Frances LPN  Sent: 9/20/2023   1:31 PM CDT  To: Arabella Sullivan NP    PA filled out for Ms. Puckett's dexilant, but the insurance company said it is not covered under her plan. Please advise.

## 2023-09-26 ENCOUNTER — PATIENT MESSAGE (OUTPATIENT)
Dept: ENDOSCOPY | Facility: HOSPITAL | Age: 58
End: 2023-09-26
Payer: COMMERCIAL

## 2023-09-26 ENCOUNTER — TELEPHONE (OUTPATIENT)
Dept: ENDOSCOPY | Facility: HOSPITAL | Age: 58
End: 2023-09-26
Payer: COMMERCIAL

## 2023-09-26 NOTE — TELEPHONE ENCOUNTER
Spoke to patient to schedule procedure(s) Esophageal Manometry       Physician to perform procedure(s) Dr. RICHY Ibrahim   Date of Procedure (s) 11/17/23  Arrival Time 9:00 AM  Time of Procedure(s) 10:00 AM   Location of Procedure(s) [place] 4th Floor  Type of Rx Prep sent to patient: Other  Instructions provided to patient via [facility]    Patient was informed on the following information and verbalized understanding. Screening questionnaire reviewed with patient and complete. If procedure requires anesthesia, a responsible adult needs to be present to accompany the patient home, patient cannot drive after receiving anesthesia. Appointment details are tentative, especially check-in time. Patient will receive a prep-op call 4 days prior to confirm check-in time for procedure. If applicable the patient should contact their pharmacy to verify Rx for procedure prep is ready for pick-up. Patient was advised to call the scheduling department at [phone] if pharmacy states no Rx is available. Patient was advised to call the endoscopy scheduling department if any questions or concerns arise.      [facility] Scheduling Department

## 2023-09-26 NOTE — TELEPHONE ENCOUNTER
After reviewing patient medical history, answering all questions patient had regarding procedure and letting patient know first available date patient decided not to schedule at this time. Patient stated she will call back to schedule.

## 2023-09-26 NOTE — TELEPHONE ENCOUNTER
Esophageal Manometry (Motility) with Impedance Instructions    Date of procedure: ***/***/*** Arrive at: ***:***{Time; am/pm:49341}    Location of Department:   [facility] -{ Endoscopy Locations:[address]}      How to prep:      Day Before Procedure ***/***/***    You may have a light evening meal.   No solid food after 7:00 pm.   You may have clear liquids until midnight. See below for list.       Day of the Procedure ***/***/***    If your appointment is in the afternoon, you may have clear liquids until  ***:***{Time; am/pm:46857}.       What You CANNOT do:   Do not drink milk or anything colored red.  Do not drink alcohol.  No gum chewing or candy morning of procedure.    Liquids That Are OK to Drink:   Water  Sports drinks (Gatorade, Power-Aid)  Coffee or tea (no cream or nondairy creamer)  Clear juices without pulp (apple, white grape)  Gelatin desserts (no fruit or toppings)  Clear soda (sprite, coke, ginger ale)  Chicken broth (until 12 midnight the night before procedure)      Comments: ***

## 2023-09-27 ENCOUNTER — TELEPHONE (OUTPATIENT)
Dept: GASTROENTEROLOGY | Facility: CLINIC | Age: 58
End: 2023-09-27
Payer: COMMERCIAL

## 2023-09-27 NOTE — TELEPHONE ENCOUNTER
----- Message from Geri Sandoval sent at 9/27/2023  1:59 PM CDT -----  Contact: self  Patient is req a call back she has a couple of questions about the test you ordered and she has belen in November.  Please call back at [phone] and thanks

## 2023-09-27 NOTE — TELEPHONE ENCOUNTER
Call placed to Ms. Puckett in regards to message received. I informed her that an appeal was faxed over to her insurance in regards to her dexilent and they have 7 days to make a decision. She verbalized understanding. She also stated that the manometry is not done under anesthesia and that she has a bad gag reflex and is not sure how she will complete this time. I informed her I would lt Arabella know. No further issues noted.

## 2023-10-25 ENCOUNTER — TELEPHONE (OUTPATIENT)
Dept: GASTROENTEROLOGY | Facility: CLINIC | Age: 58
End: 2023-10-25
Payer: COMMERCIAL

## 2023-10-25 NOTE — TELEPHONE ENCOUNTER
----- Message from Marie Wynn sent at 10/25/2023  1:14 PM CDT -----  Contact: self  Type:  Needs Medical Advice    Who Called: Pt  Symptoms (please be specific):  checking on the status of the dexilent appeal   Would the patient rather a call back or a response via "[employer]ner? call  Best Call Back Number: [phone]  Please call to advise... Thank you...

## 2023-11-01 ENCOUNTER — PATIENT MESSAGE (OUTPATIENT)
Dept: FAMILY MEDICINE | Facility: CLINIC | Age: 58
End: 2023-11-01
Payer: COMMERCIAL

## 2023-11-01 ENCOUNTER — TELEPHONE (OUTPATIENT)
Dept: FAMILY MEDICINE | Facility: CLINIC | Age: 58
End: 2023-11-01

## 2023-11-01 ENCOUNTER — TELEPHONE (OUTPATIENT)
Dept: FAMILY MEDICINE | Facility: CLINIC | Age: 58
End: 2023-11-01
Payer: COMMERCIAL

## 2023-11-01 RX ORDER — PREDNISONE 20 MG/1
TABLET ORAL
Qty: 10 TABLET | Refills: 0 | Status: SHIPPED | OUTPATIENT
Start: 2023-11-01 | End: 2023-12-22 | Stop reason: ALTCHOICE

## 2023-11-01 NOTE — TELEPHONE ENCOUNTER
----- Message from Christine Hylton sent at 11/1/2023  9:00 AM CDT -----  Regarding: appointment  Contact: patient  Type:  Sooner Appointment Request    Caller is requesting a sooner appointment.  Caller declined first available appointment listed below.  Caller will not accept being placed on the waitlist and is requesting a message be sent to doctor.    Name of Caller:  patient  When is the first available appointment?  11/10/23  Symptoms:  rash  Would the patient rather a call back or a response via [facility]? call  Best Call Back Number:  [phone] (home)     Additional Information:  Please call patient to schedule.  Thanks!

## 2023-11-02 NOTE — TELEPHONE ENCOUNTER
JESSICA for pt to call her back to let her know  
Pt states she has a rash on her leg, foot, abdomen and back x 2-3 days. States she has used cortisone cream once - denies using antihistamines.    Had same issue in Jan - seen at  and given steriods.   Denies changes in detergent, soaps, ect.... No new foods.  Never had allergy testing.   Would like to see if she can get a steroid pack   
Sent prednisone to the pharmacy. Please let her know.   
initiation of breastfeeding/breast milk feeding

## 2023-11-03 ENCOUNTER — TELEPHONE (OUTPATIENT)
Dept: ENDOSCOPY | Facility: HOSPITAL | Age: 58
End: 2023-11-03
Payer: COMMERCIAL

## 2023-11-03 NOTE — TELEPHONE ENCOUNTER
Pt called to see if some specific dates in December were open in case she wanted to reschedule. Both of the dates are full already. Pt asked some other questions about time. All questions answered. Patient verbalized understanding. Encouraged pt to call back for any needs.

## 2023-11-08 ENCOUNTER — TELEPHONE (OUTPATIENT)
Dept: ENDOSCOPY | Facility: HOSPITAL | Age: 58
End: 2023-11-08
Payer: COMMERCIAL

## 2023-11-09 ENCOUNTER — TELEPHONE (OUTPATIENT)
Dept: FAMILY MEDICINE | Facility: CLINIC | Age: 58
End: 2023-11-09
Payer: COMMERCIAL

## 2023-11-09 NOTE — TELEPHONE ENCOUNTER
----- Message from Christine Concetta sent at 11/8/2023  4:10 PM CST -----  Regarding: advice  Contact: patient  Type: Needs Medical Advice  Who Called:  patient  Symptoms (please be specific):  rash  How long has patient had these symptoms:    Pharmacy name and phone #:    [id number] - [facility] [address] [address] [facility] [address]  [id number] [place] [id number]  Phone: [phone] Fax: [phone]    Best Call Back Number: [phone]  Additional Information: Patient states the dermatologist is sending results of test.  How do you want her to proceed.  Please call patient to advise.  Thanks!

## 2023-11-09 NOTE — TELEPHONE ENCOUNTER
Records on your top to do rack for review.  Pt states she is only taking the pepcid and dexilant and triamcinolone cream prn and her rash is better.  Wants to know what the next step is.

## 2023-11-10 ENCOUNTER — TELEPHONE (OUTPATIENT)
Dept: ENDOSCOPY | Facility: HOSPITAL | Age: 58
End: 2023-11-10
Payer: COMMERCIAL

## 2023-11-10 NOTE — TELEPHONE ENCOUNTER
I would just stay on those for now, hold all other medications. After the rash has been gone for 2-3 weeks then if you feel you need the Bentyl, carafate and D3 then would start back one at a time and see if the rash returns.

## 2023-11-12 DIAGNOSIS — K21.9 GASTROESOPHAGEAL REFLUX DISEASE, UNSPECIFIED WHETHER ESOPHAGITIS PRESENT: ICD-10-CM

## 2023-11-14 ENCOUNTER — TELEPHONE (OUTPATIENT)
Dept: ENDOSCOPY | Facility: HOSPITAL | Age: 58
End: 2023-11-14
Payer: COMMERCIAL

## 2023-11-14 RX ORDER — FAMOTIDINE 40 MG/1
TABLET, FILM COATED ORAL
Qty: 90 TABLET | Refills: 0 | Status: SHIPPED | OUTPATIENT
Start: 2023-11-14 | End: 2024-02-08

## 2023-11-14 NOTE — TELEPHONE ENCOUNTER
Patient calling to possibly rescheduled esophageal manometry procedure scheduled on 11/17/23 to either 12/1, 12/8 or 12/15. Patient informed that 12/15 is available. Patient states she will keep her current day of 11/17/23 for now and give us a call back.

## 2023-11-17 ENCOUNTER — HOSPITAL ENCOUNTER (OUTPATIENT)
Facility: HOSPITAL | Age: 58
Discharge: HOME | End: 2023-11-17
Attending: INTERNAL MEDICINE | Admitting: INTERNAL MEDICINE
Payer: COMMERCIAL

## 2023-11-17 VITALS
DIASTOLIC BLOOD PRESSURE: 69 MMHG | SYSTOLIC BLOOD PRESSURE: 137 MMHG | RESPIRATION RATE: 16 BRPM | BODY MASS INDEX: 27.66 KG/M2 | OXYGEN SATURATION: 97 % | HEIGHT: 64 IN | WEIGHT: 162 LBS | TEMPERATURE: 99 F | HEART RATE: 101 BPM

## 2023-11-17 PROCEDURE — 91010 PR ESOPHAGEAL MOTILITY STUDY, MA2METRY: ICD-10-PCS | Mod: 26,,, | Performed by: INTERNAL MEDICINE

## 2023-11-17 PROCEDURE — 91010 ESOPHAGUS MOTILITY STUDY: CPT | Mod: TC | Performed by: INTERNAL MEDICINE

## 2023-11-17 PROCEDURE — 25000003 PHARM REV CODE 250: Performed by: INTERNAL MEDICINE

## 2023-11-17 PROCEDURE — 91010 ESOPHAGUS MOTILITY STUDY: CPT | Mod: 26,,, | Performed by: INTERNAL MEDICINE

## 2023-11-17 RX ORDER — LIDOCAINE HYDROCHLORIDE 20 MG/ML
JELLY TOPICAL ONCE
Status: COMPLETED | OUTPATIENT
Start: 2023-11-17 | End: 2023-11-17

## 2023-11-17 RX ADMIN — LIDOCAINE HYDROCHLORIDE 10 ML: 20 JELLY TOPICAL at 09:11

## 2023-11-17 NOTE — PROVATION PATIENT INSTRUCTIONS
Discharge Summary/Instructions after an Endoscopic Procedure  Patient Name: Lavern Hodges  Patient MRN: [id number]  Patient YOB: 1965 Friday, November 17, 2023  Sol Ibrahim MD  Dear patient,  As a result of recent federal legislation (The Federal Cures Act), you may   receive lab or pathology results from your procedure in your [facility]   account before your physician is able to contact you. Your physician or   their representative will relay the results to you with their   recommendations at their soonest availability.  Thank you,  RESTRICTIONS:  During your procedure today, you received medications for sedation.  These   medications may affect your judgment, balance and coordination.  Therefore,   for 24 hours, you have the following restrictions:   - DO NOT drive a car, operate machinery, make legal/financial decisions,   sign important papers or drink alcohol.    ACTIVITY:  Today: no heavy lifting, straining or running due to procedural   sedation/anesthesia.  The following day: return to full activity including work.  DIET:  Eat and drink normally unless instructed otherwise.     TREATMENT FOR COMMON SIDE EFFECTS:  - Mild abdominal pain, nausea, belching, bloating or excessive gas:  rest,   eat lightly and use a heating pad.  - Sore Throat: treat with throat lozenges and/or gargle with warm salt   water.  - Because air was used during the procedure, expelling large amounts of air   from your rectum or belching is normal.  - If a bowel prep was taken, you may not have a bowel movement for 1-3 days.    This is normal.  SYMPTOMS TO WATCH FOR AND REPORT TO YOUR PHYSICIAN:  1. Abdominal pain or bloating, other than gas cramps.  2. Chest pain.  3. Back pain.  4. Signs of infection such as: chills or fever occurring within 24 hours   after the procedure.  5. Rectal bleeding, which would show as bright red, maroon, or black stools.   (A tablespoon of blood from the rectum is not serious, especially if    hemorrhoids are present.)  6. Vomiting.  7. Weakness or dizziness.  GO DIRECTLY TO THE NEAREST EMERGENCY ROOM IF YOU HAVE ANY OF THE FOLLOWING:      Difficulty breathing              Chills and/or fever over 101 F   Persistent vomiting and/or vomiting blood   Severe abdominal pain   Severe chest pain   Black, tarry stools   Bleeding- more than one tablespoon   Any other symptom or condition that you feel may need urgent attention  Your doctor recommends these additional instructions:  If any biopsies were taken, your doctors clinic will contact you in 1 to 2   weeks with any results.  Follow up with your referring provider.  For questions, problems or results please call your physician - Sol Ibrahim MD at Work:  [phone].  [facility], EMERGENCY ROOM PHONE NUMBER: [phone]  IF A COMPLICATION OR EMERGENCY SITUATION ARISES AND YOU ARE UNABLE TO REACH   YOUR PHYSICIAN - GO DIRECTLY TO THE EMERGENCY ROOM.  Sol Ibrahim MD  11/17/2023 1:51:49 PM  This report has been verified and signed electronically.  Dear patient,  As a result of recent federal legislation (The Federal Cures Act), you may   receive lab or pathology results from your procedure in your [facility]   account before your physician is able to contact you. Your physician or   their representative will relay the results to you with their   recommendations at their soonest availability.  Thank you,  PROVATION

## 2023-11-29 ENCOUNTER — CLINICAL SUPPORT (OUTPATIENT)
Dept: FAMILY MEDICINE | Facility: CLINIC | Age: 58
End: 2023-11-29
Payer: COMMERCIAL

## 2023-11-29 DIAGNOSIS — Z23 NEED FOR VACCINATION: Primary | ICD-10-CM

## 2023-11-29 PROCEDURE — 90686 IIV4 VACC NO PRSV 0.5 ML IM: CPT | Mod: S$GLB,,, | Performed by: INTERNAL MEDICINE

## 2023-11-29 PROCEDURE — 90471 FLU VACCINE (QUAD) GREATER THAN OR EQUAL TO 3YO PRESERVATIVE FREE IM: ICD-10-PCS | Mod: S$GLB,,, | Performed by: INTERNAL MEDICINE

## 2023-11-29 PROCEDURE — 90686 FLU VACCINE (QUAD) GREATER THAN OR EQUAL TO 3YO PRESERVATIVE FREE IM: ICD-10-PCS | Mod: S$GLB,,, | Performed by: INTERNAL MEDICINE

## 2023-11-29 PROCEDURE — 90471 IMMUNIZATION ADMIN: CPT | Mod: S$GLB,,, | Performed by: INTERNAL MEDICINE

## 2023-12-01 ENCOUNTER — TELEPHONE (OUTPATIENT)
Dept: GASTROENTEROLOGY | Facility: CLINIC | Age: 58
End: 2023-12-01
Payer: COMMERCIAL

## 2023-12-01 DIAGNOSIS — K44.9 HIATAL HERNIA: ICD-10-CM

## 2023-12-01 DIAGNOSIS — R12 HEARTBURN: ICD-10-CM

## 2023-12-01 RX ORDER — DEXLANSOPRAZOLE 60 MG/1
60 CAPSULE, DELAYED RELEASE ORAL DAILY
Qty: 30 CAPSULE | Refills: 2 | Status: SHIPPED | OUTPATIENT
Start: 2023-12-01 | End: 2024-02-28

## 2023-12-01 NOTE — TELEPHONE ENCOUNTER
----- Message from Em Israel sent at 12/1/2023 10:09 AM CST -----  Contact: Patient  Type:  Test Results    Who Called:   Patient  Name of Test (Lab/Mammo/Etc):   Procedure  Date of Test:   11/17  Ordering Provider:   Arabella Sullivan  Where the test was performed:   [facility]    Would the patient rather a call back or a response via [facility]?   Call back  Best Call Back Number:   [phone]    Additional Information:    States she would like to speak with someone to go over her test results and next steps - please call to discuss - thank you

## 2023-12-22 ENCOUNTER — OFFICE VISIT (OUTPATIENT)
Dept: FAMILY MEDICINE | Facility: CLINIC | Age: 58
End: 2023-12-22
Payer: COMMERCIAL

## 2023-12-22 VITALS
WEIGHT: 163.56 LBS | SYSTOLIC BLOOD PRESSURE: 136 MMHG | BODY MASS INDEX: 28.08 KG/M2 | HEART RATE: 81 BPM | TEMPERATURE: 98 F | DIASTOLIC BLOOD PRESSURE: 72 MMHG | OXYGEN SATURATION: 97 % | RESPIRATION RATE: 18 BRPM

## 2023-12-22 DIAGNOSIS — R59.0 ENLARGED LYMPH NODE IN NECK: ICD-10-CM

## 2023-12-22 DIAGNOSIS — K21.00 GASTROESOPHAGEAL REFLUX DISEASE WITH ESOPHAGITIS WITHOUT HEMORRHAGE: ICD-10-CM

## 2023-12-22 DIAGNOSIS — Z00.00 LABORATORY EXAM ORDERED AS PART OF ROUTINE GENERAL MEDICAL EXAMINATION: ICD-10-CM

## 2023-12-22 DIAGNOSIS — D51.9 ANEMIA DUE TO VITAMIN B12 DEFICIENCY, UNSPECIFIED B12 DEFICIENCY TYPE: ICD-10-CM

## 2023-12-22 DIAGNOSIS — Z00.00 ANNUAL PHYSICAL EXAM: Primary | ICD-10-CM

## 2023-12-22 DIAGNOSIS — E55.9 VITAMIN D DEFICIENCY: ICD-10-CM

## 2023-12-22 PROCEDURE — 1159F PR MEDICATION LIST DOCUMENTED IN MEDICAL RECORD: ICD-10-PCS | Mod: CPTII,S$GLB,, | Performed by: NURSE PRACTITIONER

## 2023-12-22 PROCEDURE — 3044F PR MOST RECENT HEMOGLOBIN A1C LEVEL <7.0%: ICD-10-PCS | Mod: CPTII,S$GLB,, | Performed by: NURSE PRACTITIONER

## 2023-12-22 PROCEDURE — 3008F PR BODY MASS INDEX (BMI) DOCUMENTED: ICD-10-PCS | Mod: CPTII,S$GLB,, | Performed by: NURSE PRACTITIONER

## 2023-12-22 PROCEDURE — 99396 PR PREVENTIVE VISIT,EST,40-64: ICD-10-PCS | Mod: S$GLB,,, | Performed by: NURSE PRACTITIONER

## 2023-12-22 PROCEDURE — 99396 PREV VISIT EST AGE 40-64: CPT | Mod: S$GLB,,, | Performed by: NURSE PRACTITIONER

## 2023-12-22 PROCEDURE — 3044F HG A1C LEVEL LT 7.0%: CPT | Mod: CPTII,S$GLB,, | Performed by: NURSE PRACTITIONER

## 2023-12-22 PROCEDURE — 1160F RVW MEDS BY RX/DR IN RCRD: CPT | Mod: CPTII,S$GLB,, | Performed by: NURSE PRACTITIONER

## 2023-12-22 PROCEDURE — 3075F SYST BP GE 130 - 139MM HG: CPT | Mod: CPTII,S$GLB,, | Performed by: NURSE PRACTITIONER

## 2023-12-22 PROCEDURE — 1160F PR REVIEW ALL MEDS BY PRESCRIBER/CLIN PHARMACIST DOCUMENTED: ICD-10-PCS | Mod: CPTII,S$GLB,, | Performed by: NURSE PRACTITIONER

## 2023-12-22 PROCEDURE — 3075F PR MOST RECENT SYSTOLIC BLOOD PRESS GE 130-139MM HG: ICD-10-PCS | Mod: CPTII,S$GLB,, | Performed by: NURSE PRACTITIONER

## 2023-12-22 PROCEDURE — 3078F PR MOST RECENT DIASTOLIC BLOOD PRESSURE < 80 MM HG: ICD-10-PCS | Mod: CPTII,S$GLB,, | Performed by: NURSE PRACTITIONER

## 2023-12-22 PROCEDURE — 3008F BODY MASS INDEX DOCD: CPT | Mod: CPTII,S$GLB,, | Performed by: NURSE PRACTITIONER

## 2023-12-22 PROCEDURE — 3078F DIAST BP <80 MM HG: CPT | Mod: CPTII,S$GLB,, | Performed by: NURSE PRACTITIONER

## 2023-12-22 PROCEDURE — 1159F MED LIST DOCD IN RCRD: CPT | Mod: CPTII,S$GLB,, | Performed by: NURSE PRACTITIONER

## 2023-12-22 NOTE — PROGRESS NOTES
Subjective:       Patient ID: Lavern Hodges is a 58 y.o. female.    Chief Complaint: Annual Exam    HPI here for annual exam. Due for labs.     Dexilent helps GERD. Pepcid if needed. Stopped the carafate and B12 and rash went away. She was seen by Dermatology who said it was a medication reaction.     She has noticed a lump to the right side of her posterior neck area. States it is not tender. She is concerned about it and would like to have Ultrasound done.     See ROS    The following portion of the patients history was reviewed and updated as appropriate: allergies, current medications, past medical and surgical history. Past social history and problem list reviewed. Family PMH and Past social history reviewed. Tobacco, Illicit drug use reviewed.      Review of patient's allergies indicates:   Allergen Reactions    Cefaclor Hives    Codeine Hives         Current Outpatient Medications:     albuterol (VENTOLIN HFA) 90 mcg/actuation inhaler, Inhale 2 puffs into the lungs every 6 (six) hours as needed for Wheezing or Shortness of Breath. Rescue, Disp: 18 g, Rfl: 0    calcium carbonate (TUMS) 200 mg calcium (500 mg) chewable tablet, Take 1 tablet by mouth daily as needed for Heartburn., Disp: , Rfl:     chlorhexidine (PERIDEX) 0.12 % solution, daily as needed., Disp: , Rfl:     cholecalciferol, vitamin D3, (VITAMIN D3) 25 mcg (1,000 unit) capsule, 2,000 Units., Disp: , Rfl:     dexlansoprazole (DEXILANT) 60 mg capsule, Take 1 capsule (60 mg total) by mouth once daily., Disp: 30 capsule, Rfl: 2    dicyclomine (BENTYL) 20 mg tablet, , Disp: , Rfl:     famotidine (PEPCID) 40 MG tablet, TAKE 1 TABLET(40 MG) BY MOUTH EVERY NIGHT AS NEEDED FOR HEARTBURN, Disp: 90 tablet, Rfl: 0    predniSONE (DELTASONE) 20 MG tablet, Take two tablets in am for 3 days, then one for 3 days then 1/2 for 2 days, Disp: 10 tablet, Rfl: 0    sucralfate (CARAFATE) 1 gram tablet, TAKE 1 TABLET(1 GRAM) BY MOUTH TWICE DAILY AS NEEDED FOR STOMACH ACID  OR REFLUX, Disp: 180 tablet, Rfl: 1    Past Medical History:   Diagnosis Date    Allergy     Anxiety     Asthma     due to allergies     GERD (gastroesophageal reflux disease)     History of nephrolithiasis     Irritable bowel syndrome     Kidney stone     lithotripsy 20 years ago    Peptic ulcer disease     PONV (postoperative nausea and vomiting)     Colin syndrome     Vitamin D deficiency        Past Surgical History:   Procedure Laterality Date     SECTION      times 2    COLONOSCOPY N/A 2016    Procedure: COLONOSCOPY;  Surgeon: Sherman Arevalo MD;  Location: [facility];  Service: Endoscopy;  Laterality: N/A; repeat in 10 years. clear.  internal/external hemorrhoids only.    ESOPHAGEAL MANOMETRY WITH MEASUREMENT OF IMPEDANCE N/A 2023    Procedure: MANOMETRY, ESOPHAGUS, WITH IMPEDANCE MEASUREMENT;  Surgeon: Sol Ibrahim MD;  Location: [facility] ([address] [place] [address]);  Service: Endoscopy;  Laterality: N/A;  instr portal-alex alvero-tb  11/10-lvm for precall-MS  -precall complete-KPvt    ESOPHAGOGASTRODUODENOSCOPY  2016    Dr. Arevalo: Medium-sized hiatus hernia. a few gastric polyps biopsied; biopsy: Fundic gland polyps, no dysplasia    ESOPHAGOGASTRODUODENOSCOPY N/A 2021    Procedure: EGD (ESOPHAGOGASTRODUODENOSCOPY);  Surgeon: Sherman Arevalo MD;  Location: [facility];  Service: Endoscopy;  Laterality: N/A;    HYSTEROSCOPY      TUBAL LIGATION      UPPER GASTROINTESTINAL ENDOSCOPY  10/29/2010    Dr. Arevalo: michael geigertzki ring- dilated, gastric polyp removed, otherwise normal findings. repeat PRN    WISDOM TOOTH EXTRACTION         Social History     Socioeconomic History    Marital status:     Number of children: 2   Occupational History     Employer: [email or web address]   Tobacco Use    Smoking status: Former     Current packs/day: 0.00     Types: Cigarettes     Quit date: 8/3/1994     Years since quittin.4     Passive exposure: Past    Smokeless tobacco: Never    Substance and Sexual Activity    Alcohol use: No    Drug use: No    Sexual activity: Yes     Partners: Male     Birth control/protection: None, See Surgical Hx     Review of Systems   Constitutional:  Negative for fatigue and fever.   HENT:  Positive for postnasal drip and rhinorrhea.    Eyes:  Negative for visual disturbance.   Respiratory:  Negative for cough, chest tightness, shortness of breath and wheezing.    Cardiovascular:  Negative for chest pain, palpitations and leg swelling.   Gastrointestinal:  Negative for abdominal pain, diarrhea, nausea and vomiting.   Genitourinary: Negative.    Musculoskeletal:  Negative for arthralgias, back pain and gait problem.   Neurological:  Negative for headaches.   Hematological:  Positive for adenopathy (right neck area).   Psychiatric/Behavioral:  Negative for dysphoric mood and sleep disturbance. The patient is not nervous/anxious.        Objective:      /72 (BP Location: Left arm, Patient Position: Sitting, BP Method: Large (Manual))   Pulse 81   Temp 98.1 °F (36.7 °C)   Resp 18   Wt 74.2 kg (163 lb 9.3 oz)   LMP 03/16/2021 (Approximate)   SpO2 97%   BMI 28.08 kg/m²      Physical Exam  Constitutional:       Appearance: Normal appearance. She is overweight.   HENT:      Head: Normocephalic.      Right Ear: Tympanic membrane, ear canal and external ear normal.      Left Ear: Tympanic membrane, ear canal and external ear normal.      Nose: Rhinorrhea present.      Mouth/Throat:      Tonsils: No tonsillar exudate.   Eyes:      Pupils: Pupils are equal, round, and reactive to light.   Neck:      Thyroid: No thyromegaly.      Vascular: No carotid bruit.     Cardiovascular:      Rate and Rhythm: Normal rate and regular rhythm.      Pulses: Normal pulses.      Heart sounds: Normal heart sounds. No murmur heard.  Pulmonary:      Effort: Pulmonary effort is normal.      Breath sounds: Normal breath sounds. No decreased breath sounds or wheezing.   Abdominal:       General: Bowel sounds are normal.      Tenderness: There is no abdominal tenderness.   Musculoskeletal:         General: Normal range of motion.      Cervical back: Normal range of motion.      Right lower leg: No edema.      Left lower leg: No edema.      Comments: Gait normal.  strong, equal   Skin:     General: Skin is warm and dry.      Capillary Refill: Capillary refill takes less than 2 seconds.   Neurological:      General: No focal deficit present.      Mental Status: She is alert.   Psychiatric:         Attention and Perception: Attention and perception normal.         Mood and Affect: Mood and affect normal.         Speech: Speech normal.         Behavior: Behavior normal.         Assessment:       1. Annual physical exam    2. Vitamin D deficiency    3. Gastroesophageal reflux disease with esophagitis without hemorrhage    4. Enlarged lymph node in neck    5. Anemia due to vitamin B12 deficiency, unspecified B12 deficiency type    6. Laboratory exam ordered as part of routine general medical examination        Plan:       Annual physical exam    Vitamin D deficiency  -     Vitamin D; Future; Expected date: 12/22/2023    Gastroesophageal reflux disease with esophagitis without hemorrhage: improved with current medications    Enlarged lymph node in neck: will get US for evaluation  -     US Soft Tissue Head Neck Thyroid; Future; Expected date: 12/22/2023    Anemia due to vitamin B12 deficiency, unspecified B12 deficiency type  -     Vitamin B12; Future; Expected date: 12/22/2023    Laboratory exam ordered as part of routine general medical examination  -     TSH; Future; Expected date: 12/22/2023  -     Hemoglobin A1C; Future; Expected date: 12/22/2023  -     Lipid Panel; Future; Expected date: 12/22/2023  -     Comprehensive Metabolic Panel; Future; Expected date: 12/22/2023  -     CBC Auto Differential; Future; Expected date: 12/22/2023       Continue current medication  Take medications only as  prescribed  Healthy diet, exercise  Adequate rest  Adequate hydration  Avoid allergens  Avoid excessive caffeine     Follow up 6 months.

## 2023-12-23 ENCOUNTER — LAB VISIT (OUTPATIENT)
Dept: LAB | Facility: HOSPITAL | Age: 58
End: 2023-12-23
Payer: COMMERCIAL

## 2023-12-23 DIAGNOSIS — Z00.00 LABORATORY EXAM ORDERED AS PART OF ROUTINE GENERAL MEDICAL EXAMINATION: ICD-10-CM

## 2023-12-23 DIAGNOSIS — D51.9 ANEMIA DUE TO VITAMIN B12 DEFICIENCY, UNSPECIFIED B12 DEFICIENCY TYPE: ICD-10-CM

## 2023-12-23 DIAGNOSIS — E55.9 VITAMIN D DEFICIENCY: ICD-10-CM

## 2023-12-23 LAB
25(OH)D3+25(OH)D2 SERPL-MCNC: 25 NG/ML (ref 30–96)
ALBUMIN SERPL BCP-MCNC: 4.1 G/DL (ref 3.5–5.2)
ALP SERPL-CCNC: 75 U/L (ref 55–135)
ALT SERPL W/O P-5'-P-CCNC: 18 U/L (ref 10–44)
ANION GAP SERPL CALC-SCNC: 9 MMOL/L (ref 8–16)
AST SERPL-CCNC: 16 U/L (ref 10–40)
BASOPHILS # BLD AUTO: 0.05 K/UL (ref 0–0.2)
BASOPHILS NFR BLD: 1.2 % (ref 0–1.9)
BILIRUB SERPL-MCNC: 0.9 MG/DL (ref 0.1–1)
BUN SERPL-MCNC: 14 MG/DL (ref 6–20)
CALCIUM SERPL-MCNC: 9.7 MG/DL (ref 8.7–10.5)
CHLORIDE SERPL-SCNC: 108 MMOL/L (ref 95–110)
CHOLEST SERPL-MCNC: 184 MG/DL (ref 120–199)
CHOLEST/HDLC SERPL: 3.8 {RATIO} (ref 2–5)
CO2 SERPL-SCNC: 25 MMOL/L (ref 23–29)
CREAT SERPL-MCNC: 1.3 MG/DL (ref 0.5–1.4)
DIFFERENTIAL METHOD: NORMAL
EOSINOPHIL # BLD AUTO: 0.1 K/UL (ref 0–0.5)
EOSINOPHIL NFR BLD: 2.5 % (ref 0–8)
ERYTHROCYTE [DISTWIDTH] IN BLOOD BY AUTOMATED COUNT: 12.9 % (ref 11.5–14.5)
EST. GFR  (NO RACE VARIABLE): 47.7 ML/MIN/1.73 M^2
ESTIMATED AVG GLUCOSE: 100 MG/DL (ref 68–131)
GLUCOSE SERPL-MCNC: 90 MG/DL (ref 70–110)
HBA1C MFR BLD: 5.1 % (ref 4–5.6)
HCT VFR BLD AUTO: 43.7 % (ref 37–48.5)
HDLC SERPL-MCNC: 49 MG/DL (ref 40–75)
HDLC SERPL: 26.6 % (ref 20–50)
HGB BLD-MCNC: 14.8 G/DL (ref 12–16)
IMM GRANULOCYTES # BLD AUTO: 0.01 K/UL (ref 0–0.04)
IMM GRANULOCYTES NFR BLD AUTO: 0.2 % (ref 0–0.5)
LDLC SERPL CALC-MCNC: 99.2 MG/DL (ref 63–159)
LYMPHOCYTES # BLD AUTO: 1.3 K/UL (ref 1–4.8)
LYMPHOCYTES NFR BLD: 31.8 % (ref 18–48)
MCH RBC QN AUTO: 30.6 PG (ref 27–31)
MCHC RBC AUTO-ENTMCNC: 33.9 G/DL (ref 32–36)
MCV RBC AUTO: 90 FL (ref 82–98)
MONOCYTES # BLD AUTO: 0.4 K/UL (ref 0.3–1)
MONOCYTES NFR BLD: 9 % (ref 4–15)
NEUTROPHILS # BLD AUTO: 2.2 K/UL (ref 1.8–7.7)
NEUTROPHILS NFR BLD: 55.3 % (ref 38–73)
NONHDLC SERPL-MCNC: 135 MG/DL
NRBC BLD-RTO: 0 /100 WBC
PLATELET # BLD AUTO: 199 K/UL (ref 150–450)
PMV BLD AUTO: 9.5 FL (ref 9.2–12.9)
POTASSIUM SERPL-SCNC: 4.4 MMOL/L (ref 3.5–5.1)
PROT SERPL-MCNC: 7.1 G/DL (ref 6–8.4)
RBC # BLD AUTO: 4.84 M/UL (ref 4–5.4)
SODIUM SERPL-SCNC: 142 MMOL/L (ref 136–145)
TRIGL SERPL-MCNC: 179 MG/DL (ref 30–150)
TSH SERPL DL<=0.005 MIU/L-ACNC: 2.1 UIU/ML (ref 0.4–4)
VIT B12 SERPL-MCNC: 201 PG/ML (ref 210–950)
WBC # BLD AUTO: 4.02 K/UL (ref 3.9–12.7)

## 2023-12-23 PROCEDURE — 85025 COMPLETE CBC W/AUTO DIFF WBC: CPT | Performed by: NURSE PRACTITIONER

## 2023-12-23 PROCEDURE — 84443 ASSAY THYROID STIM HORMONE: CPT | Performed by: NURSE PRACTITIONER

## 2023-12-23 PROCEDURE — 80053 COMPREHEN METABOLIC PANEL: CPT | Performed by: NURSE PRACTITIONER

## 2023-12-23 PROCEDURE — 82306 VITAMIN D 25 HYDROXY: CPT | Performed by: NURSE PRACTITIONER

## 2023-12-23 PROCEDURE — 80061 LIPID PANEL: CPT | Performed by: NURSE PRACTITIONER

## 2023-12-23 PROCEDURE — 83036 HEMOGLOBIN GLYCOSYLATED A1C: CPT | Performed by: NURSE PRACTITIONER

## 2023-12-23 PROCEDURE — 36415 COLL VENOUS BLD VENIPUNCTURE: CPT | Mod: PO | Performed by: NURSE PRACTITIONER

## 2023-12-23 PROCEDURE — 82607 VITAMIN B-12: CPT | Performed by: NURSE PRACTITIONER

## 2023-12-26 ENCOUNTER — HOSPITAL ENCOUNTER (OUTPATIENT)
Dept: RADIOLOGY | Facility: HOSPITAL | Age: 58
Discharge: HOME | End: 2023-12-26
Attending: NURSE PRACTITIONER
Payer: COMMERCIAL

## 2023-12-26 ENCOUNTER — PATIENT MESSAGE (OUTPATIENT)
Dept: FAMILY MEDICINE | Facility: CLINIC | Age: 58
End: 2023-12-26
Payer: COMMERCIAL

## 2023-12-26 DIAGNOSIS — R59.0 ENLARGED LYMPH NODE IN NECK: ICD-10-CM

## 2023-12-26 PROCEDURE — 76536 US EXAM OF HEAD AND NECK: CPT | Mod: TC,PO

## 2023-12-26 PROCEDURE — 76536 US EXAM OF HEAD AND NECK: CPT | Mod: 26,,, | Performed by: RADIOLOGY

## 2023-12-26 PROCEDURE — 76536 US SOFT TISSUE HEAD NECK THYROID: ICD-10-PCS | Mod: 26,,, | Performed by: RADIOLOGY

## 2024-01-05 ENCOUNTER — PATIENT MESSAGE (OUTPATIENT)
Dept: FAMILY MEDICINE | Facility: CLINIC | Age: 59
End: 2024-01-05
Payer: COMMERCIAL

## 2024-01-05 ENCOUNTER — TELEPHONE (OUTPATIENT)
Dept: FAMILY MEDICINE | Facility: CLINIC | Age: 59
End: 2024-01-05
Payer: COMMERCIAL

## 2024-01-05 NOTE — TELEPHONE ENCOUNTER
----- Message from Nani Stoddard sent at 1/5/2024  9:17 AM CST -----  Regarding: requesting call back  Contact: pt  Type:  Needs Medical Advice    Who Called: pt    Symptoms (please be specific): bladder infection. burning     How long has patient had these symptoms:  UTI symptoms    Pharmacy name and phone #:    [id number] - [place] [address] AT [facility] [address]  [address] [place] [id number]  Phone: [phone] Fax: [phone]    Would the patient rather a call back or a response via [facility]? Call back    Best Call Back Number: [phone]    Additional Information: pt is requesting a call back to discuss possible UTI and question about bloodwork  Please advise.  Thank you.

## 2024-01-08 ENCOUNTER — PATIENT MESSAGE (OUTPATIENT)
Dept: FAMILY MEDICINE | Facility: CLINIC | Age: 59
End: 2024-01-08
Payer: COMMERCIAL

## 2024-01-10 RX ORDER — FLUCONAZOLE 150 MG/1
150 TABLET ORAL DAILY
Qty: 3 TABLET | Refills: 0 | Status: SHIPPED | OUTPATIENT
Start: 2024-01-10 | End: 2024-01-13

## 2024-02-07 DIAGNOSIS — K21.9 GASTROESOPHAGEAL REFLUX DISEASE, UNSPECIFIED WHETHER ESOPHAGITIS PRESENT: ICD-10-CM

## 2024-02-08 RX ORDER — FAMOTIDINE 40 MG/1
TABLET, FILM COATED ORAL
Qty: 90 TABLET | Refills: 1 | Status: SHIPPED | OUTPATIENT
Start: 2024-02-08

## 2024-02-26 DIAGNOSIS — K44.9 HIATAL HERNIA: ICD-10-CM

## 2024-02-26 DIAGNOSIS — R12 HEARTBURN: ICD-10-CM

## 2024-02-28 RX ORDER — DEXLANSOPRAZOLE 60 MG/1
60 CAPSULE, DELAYED RELEASE ORAL
Qty: 30 CAPSULE | Refills: 2 | Status: SHIPPED | OUTPATIENT
Start: 2024-02-28

## 2024-05-09 ENCOUNTER — OFFICE VISIT (OUTPATIENT)
Dept: FAMILY MEDICINE | Facility: CLINIC | Age: 59
End: 2024-05-09
Payer: COMMERCIAL

## 2024-05-09 VITALS
OXYGEN SATURATION: 96 % | HEIGHT: 64 IN | SYSTOLIC BLOOD PRESSURE: 116 MMHG | HEART RATE: 95 BPM | TEMPERATURE: 99 F | DIASTOLIC BLOOD PRESSURE: 70 MMHG | WEIGHT: 165.25 LBS | BODY MASS INDEX: 28.21 KG/M2

## 2024-05-09 DIAGNOSIS — F43.9 STRESS AT HOME: ICD-10-CM

## 2024-05-09 DIAGNOSIS — B37.31 VAGINAL CANDIDA: Primary | ICD-10-CM

## 2024-05-09 DIAGNOSIS — N18.32 STAGE 3B CHRONIC KIDNEY DISEASE: ICD-10-CM

## 2024-05-09 DIAGNOSIS — R39.9 UTI SYMPTOMS: ICD-10-CM

## 2024-05-09 DIAGNOSIS — N28.9 RENAL INSUFFICIENCY: ICD-10-CM

## 2024-05-09 LAB
BILIRUBIN, UA POC OHS: NEGATIVE
BLOOD, UA POC OHS: ABNORMAL
CLARITY, UA POC OHS: CLEAR
COLOR, UA POC OHS: YELLOW
GLUCOSE, UA POC OHS: NEGATIVE
KETONES, UA POC OHS: NEGATIVE
LEUKOCYTES, UA POC OHS: NEGATIVE
NITRITE, UA POC OHS: NEGATIVE
PH, UA POC OHS: 6
PROTEIN, UA POC OHS: NEGATIVE
SPECIFIC GRAVITY, UA POC OHS: >=1.03
UROBILINOGEN, UA POC OHS: 0.2

## 2024-05-09 PROCEDURE — 1159F MED LIST DOCD IN RCRD: CPT | Mod: CPTII,S$GLB,, | Performed by: NURSE PRACTITIONER

## 2024-05-09 PROCEDURE — 99213 OFFICE O/P EST LOW 20 MIN: CPT | Mod: S$GLB,,, | Performed by: NURSE PRACTITIONER

## 2024-05-09 PROCEDURE — 80053 COMPREHEN METABOLIC PANEL: CPT | Performed by: NURSE PRACTITIONER

## 2024-05-09 PROCEDURE — 1160F RVW MEDS BY RX/DR IN RCRD: CPT | Mod: CPTII,S$GLB,, | Performed by: NURSE PRACTITIONER

## 2024-05-09 PROCEDURE — 81003 URINALYSIS AUTO W/O SCOPE: CPT | Mod: QW,S$GLB,, | Performed by: NURSE PRACTITIONER

## 2024-05-09 PROCEDURE — 3008F BODY MASS INDEX DOCD: CPT | Mod: CPTII,S$GLB,, | Performed by: NURSE PRACTITIONER

## 2024-05-09 PROCEDURE — 3078F DIAST BP <80 MM HG: CPT | Mod: CPTII,S$GLB,, | Performed by: NURSE PRACTITIONER

## 2024-05-09 PROCEDURE — 3074F SYST BP LT 130 MM HG: CPT | Mod: CPTII,S$GLB,, | Performed by: NURSE PRACTITIONER

## 2024-05-09 RX ORDER — KETOCONAZOLE 20 MG/G
CREAM TOPICAL DAILY
Qty: 30 G | Refills: 1 | Status: SHIPPED | OUTPATIENT
Start: 2024-05-09

## 2024-05-09 RX ORDER — FLUCONAZOLE 150 MG/1
150 TABLET ORAL DAILY
Qty: 3 TABLET | Refills: 0 | Status: SHIPPED | OUTPATIENT
Start: 2024-05-09 | End: 2024-05-12

## 2024-05-09 NOTE — PROGRESS NOTES
Subjective:       Patient ID: Lavern Hodges is a 58 y.o. female.    Chief Complaint: VAGINAL BURNING    HPI here with concerns for UTI, vaginal burning. Denies any dysuria, urgency or frequency. Feels she has been hydrating well. Urine sample in office did not show any indication of infection.    Last labs showed eGFR 47.  BUN/Creat normal. Due to repeat lab. No history of previous kidney issues other than kidney stones.     She spent a lot of time talking about issues concerning her son. Increased stress at home. He has bipolar disorder and not taking his meds after they were changed. He is followed by Psychiatry.     See ROS    The following portion of the patients history was reviewed and updated as appropriate: allergies, current medications, past medical and surgical history. Past social history and problem list reviewed. Family PMH and Past social history reviewed. Tobacco, Illicit drug use reviewed.      Review of patient's allergies indicates:   Allergen Reactions    Cefaclor Hives    Codeine Hives         Current Outpatient Medications:     albuterol (VENTOLIN HFA) 90 mcg/actuation inhaler, Inhale 2 puffs into the lungs every 6 (six) hours as needed for Wheezing or Shortness of Breath. Rescue, Disp: 18 g, Rfl: 0    calcium carbonate (TUMS) 200 mg calcium (500 mg) chewable tablet, Take 1 tablet by mouth daily as needed for Heartburn., Disp: , Rfl:     chlorhexidine (PERIDEX) 0.12 % solution, daily as needed., Disp: , Rfl:     cholecalciferol, vitamin D3, (VITAMIN D3) 25 mcg (1,000 unit) capsule, 2,000 Units., Disp: , Rfl:     dexlansoprazole (DEXILANT) 60 mg capsule, TAKE 1 CAPSULE(60 MG) BY MOUTH EVERY DAY, Disp: 30 capsule, Rfl: 2    dicyclomine (BENTYL) 20 mg tablet, , Disp: , Rfl:     famotidine (PEPCID) 40 MG tablet, TAKE 1 TABLET(40 MG) BY MOUTH EVERY NIGHT AS NEEDED FOR HEARTBURN, Disp: 90 tablet, Rfl: 1    sucralfate (CARAFATE) 1 gram tablet, TAKE 1 TABLET(1 GRAM) BY MOUTH TWICE DAILY AS NEEDED FOR  STOMACH ACID OR REFLUX, Disp: 180 tablet, Rfl: 1    Past Medical History:   Diagnosis Date    Allergy     Anxiety     Asthma     due to allergies     GERD (gastroesophageal reflux disease)     History of nephrolithiasis     Irritable bowel syndrome     Kidney stone     lithotripsy 20 years ago    Peptic ulcer disease     PONV (postoperative nausea and vomiting)     Colin syndrome     Vitamin D deficiency        Past Surgical History:   Procedure Laterality Date     SECTION      times 2    COLONOSCOPY N/A 2016    Procedure: COLONOSCOPY;  Surgeon: Sherman Arevalo MD;  Location: [facility];  Service: Endoscopy;  Laterality: N/A; repeat in 10 years. clear.  internal/external hemorrhoids only.    ESOPHAGEAL MANOMETRY WITH MEASUREMENT OF IMPEDANCE N/A 2023    Procedure: MANOMETRY, ESOPHAGUS, WITH IMPEDANCE MEASUREMENT;  Surgeon: Sol Ibrahim MD;  Location: [facility] ([address] [place] [address]);  Service: Endoscopy;  Laterality: N/A;  instr [place]-alex alvero-tb  11/10-lvm for precall-MS  -precall complete-KPvt    ESOPHAGOGASTRODUODENOSCOPY  2016    Dr. Arevalo: Medium-sized hiatus hernia. a few gastric polyps biopsied; biopsy: Fundic gland polyps, no dysplasia    ESOPHAGOGASTRODUODENOSCOPY N/A 2021    Procedure: EGD (ESOPHAGOGASTRODUODENOSCOPY);  Surgeon: Sherman Arevalo MD;  Location: [facility];  Service: Endoscopy;  Laterality: N/A;    HYSTEROSCOPY      TUBAL LIGATION      UPPER GASTROINTESTINAL ENDOSCOPY  10/29/2010    Dr. Arevalo: mil schatzki ring- dilated, gastric polyp removed, otherwise normal findings. repeat PRN    WISDOM TOOTH EXTRACTION         Social History     Socioeconomic History    Marital status:     Number of children: 2   Occupational History     Employer: [employer]   Tobacco Use    Smoking status: Former     Current packs/day: 0.00     Types: Cigarettes     Quit date: 8/3/1994     Years since quittin.7     Passive exposure: Past    Smokeless  "tobacco: Never   Substance and Sexual Activity    Alcohol use: No    Drug use: No    Sexual activity: Yes     Partners: Male     Birth control/protection: None, See Surgical Hx     Review of Systems   Constitutional:  Negative for fatigue and fever.   HENT: Negative.     Eyes:  Negative for visual disturbance.   Respiratory:  Negative for cough, chest tightness, shortness of breath and wheezing.    Cardiovascular:  Negative for chest pain, palpitations and leg swelling.   Gastrointestinal:  Negative for abdominal pain, blood in stool, diarrhea, nausea and vomiting.   Genitourinary:  Negative for decreased urine volume, difficulty urinating, dysuria, frequency, hematuria and urgency.        Burning in the vaginal area   Musculoskeletal:  Negative for arthralgias, back pain and gait problem.   Skin: Negative.    Neurological:  Negative for headaches.   Psychiatric/Behavioral:  Negative for dysphoric mood and sleep disturbance. The patient is nervous/anxious.        Objective:      /70 (BP Location: Left arm, Patient Position: Sitting, BP Method: Medium (Manual))   Pulse 95   Temp 98.8 °F (37.1 °C)   Ht 5' 4" (1.626 m)   Wt 75 kg (165 lb 3.8 oz)   LMP 03/16/2021 (Approximate)   SpO2 96%   BMI 28.36 kg/m²      Physical Exam  Constitutional:       Appearance: Normal appearance. She is overweight.   HENT:      Head: Normocephalic.   Eyes:      Pupils: Pupils are equal, round, and reactive to light.   Neck:      Thyroid: No thyromegaly.      Vascular: No carotid bruit.   Cardiovascular:      Rate and Rhythm: Normal rate and regular rhythm.      Pulses: Normal pulses.      Heart sounds: Normal heart sounds. No murmur heard.  Pulmonary:      Effort: Pulmonary effort is normal.      Breath sounds: Normal breath sounds. No decreased breath sounds or wheezing.   Abdominal:      General: Bowel sounds are normal.      Tenderness: There is no abdominal tenderness. There is no right CVA tenderness or left CVA " tenderness.   Musculoskeletal:         General: Normal range of motion.      Cervical back: Normal range of motion.      Right lower leg: No edema.      Left lower leg: No edema.      Comments: Gait normal.  strong, equal   Skin:     General: Skin is warm and dry.      Capillary Refill: Capillary refill takes less than 2 seconds.   Neurological:      General: No focal deficit present.      Mental Status: She is alert.   Psychiatric:         Attention and Perception: Attention and perception normal.         Mood and Affect: Affect normal. Mood is anxious.         Speech: Speech normal.         Behavior: Behavior normal.      Comments: Worried about her son         Assessment:       1. Vaginal candida    2. Renal insufficiency    3. UTI symptoms    4. Stage 3b chronic kidney disease    5. Stress at home        Plan:       Vaginal candida: diflucan as prescribed.    Renal insufficiency: repeat lab  -     Comprehensive Metabolic Panel    UTI symptoms: urine did not show any indication of infection.  -     POCT Urinalysis(Instrument)    Stage 3b chronic kidney disease: hydrate well. Avoid high use of Nsaids.     Stress at home: discussed issues with her son.    Other orders  -     fluconazole (DIFLUCAN) 150 MG Tab; Take 1 tablet (150 mg total) by mouth once daily. for 3 days  Dispense: 3 tablet; Refill: 0  -     ketoconazole (NIZORAL) 2 % cream; Apply topically once daily.  Dispense: 30 g; Refill: 1       Continue current medication  Take medications only as prescribed  Healthy diet, exercise  Adequate rest  Adequate hydration  Avoid allergens  Avoid excessive caffeine     Follow up if not improving

## 2024-05-10 LAB
ALBUMIN SERPL BCP-MCNC: 4.3 G/DL (ref 3.5–5.2)
ALP SERPL-CCNC: 78 U/L (ref 55–135)
ALT SERPL W/O P-5'-P-CCNC: 14 U/L (ref 10–44)
ANION GAP SERPL CALC-SCNC: 9 MMOL/L (ref 8–16)
AST SERPL-CCNC: 13 U/L (ref 10–40)
BILIRUB SERPL-MCNC: 0.5 MG/DL (ref 0.1–1)
BUN SERPL-MCNC: 12 MG/DL (ref 6–20)
CALCIUM SERPL-MCNC: 9.8 MG/DL (ref 8.7–10.5)
CHLORIDE SERPL-SCNC: 109 MMOL/L (ref 95–110)
CO2 SERPL-SCNC: 23 MMOL/L (ref 23–29)
CREAT SERPL-MCNC: 1.4 MG/DL (ref 0.5–1.4)
EST. GFR  (NO RACE VARIABLE): 43.6 ML/MIN/1.73 M^2
GLUCOSE SERPL-MCNC: 123 MG/DL (ref 70–110)
POTASSIUM SERPL-SCNC: 4.1 MMOL/L (ref 3.5–5.1)
PROT SERPL-MCNC: 7 G/DL (ref 6–8.4)
SODIUM SERPL-SCNC: 141 MMOL/L (ref 136–145)

## 2024-05-13 ENCOUNTER — PATIENT MESSAGE (OUTPATIENT)
Dept: FAMILY MEDICINE | Facility: CLINIC | Age: 59
End: 2024-05-13
Payer: COMMERCIAL

## 2024-05-13 DIAGNOSIS — R10.31 RIGHT LOWER QUADRANT ABDOMINAL PAIN: Primary | ICD-10-CM

## 2024-05-13 PROBLEM — N18.32 STAGE 3B CHRONIC KIDNEY DISEASE: Status: ACTIVE | Noted: 2024-05-13

## 2024-05-13 NOTE — TELEPHONE ENCOUNTER
Her kidney function is down a little more. I want to get a renal CT scan. Orders placed, please call her to schedule.

## 2024-05-15 ENCOUNTER — TELEPHONE (OUTPATIENT)
Dept: FAMILY MEDICINE | Facility: CLINIC | Age: 59
End: 2024-05-15
Payer: COMMERCIAL

## 2024-05-15 NOTE — TELEPHONE ENCOUNTER
Returned pt's call. Phone went to voicemail. A detailed message was left for pt. Also responded to pt ion her pt portal with the information of the scheduled time and date.

## 2024-05-15 NOTE — TELEPHONE ENCOUNTER
----- Message from Naresh Goldstein sent at 5/15/2024 12:32 PM CDT -----  Type: Needs Medical Advice    Who Called:  Pt    Best Call Back Number: [phone]    Additional Information: Pt is returning call to office.  Please call back to advise, Thanks!

## 2024-05-18 ENCOUNTER — HOSPITAL ENCOUNTER (OUTPATIENT)
Dept: RADIOLOGY | Facility: HOSPITAL | Age: 59
Discharge: HOME | End: 2024-05-18
Attending: NURSE PRACTITIONER
Payer: COMMERCIAL

## 2024-05-18 DIAGNOSIS — R10.31 RIGHT LOWER QUADRANT ABDOMINAL PAIN: ICD-10-CM

## 2024-05-18 PROCEDURE — 74176 CT ABD & PELVIS W/O CONTRAST: CPT | Mod: TC,PO

## 2024-05-18 PROCEDURE — 74176 CT ABD & PELVIS W/O CONTRAST: CPT | Mod: 26,,, | Performed by: RADIOLOGY

## 2024-05-20 ENCOUNTER — TELEPHONE (OUTPATIENT)
Dept: FAMILY MEDICINE | Facility: CLINIC | Age: 59
End: 2024-05-20
Payer: COMMERCIAL

## 2024-05-20 DIAGNOSIS — N28.9 RENAL INSUFFICIENCY: ICD-10-CM

## 2024-05-20 DIAGNOSIS — K52.9 INFLAMMATORY BOWEL DISEASE: Primary | ICD-10-CM

## 2024-05-20 NOTE — TELEPHONE ENCOUNTER
----- Message from Nani Richi sent at 5/20/2024  2:46 PM CDT -----  Regarding: Test results  Contact: pt  Type:  Test Results    Who Called: pt    Name of Test (Lab/Mammo/Etc):   CT scan    Date of Test:  5/18    Ordering Provider:  Jon    Where the test was performed:  [facility]    Would the patient rather a call back or a response via [facility]? Call back    Best Call Back Number: [phone]    Additional Information:  pt requesting a return call to discuss test results.   Please advise.  Thank you.

## 2024-05-20 NOTE — TELEPHONE ENCOUNTER
Let her know that she needs referral to GI about the inflammatory bowel disease ( Crohn's Disease ). Will need to see GI for further work up. Referral placed. She has a small right sided kidney stone that is not obstructing urine flow. Remainder of kidney showed nothing worrisome. Hiatal hernia. Will repeat the CMP lab to see if that has improved.  Hiatal hernia.

## 2024-05-20 NOTE — TELEPHONE ENCOUNTER
Returned call and left voicemail that we will call her back when Gerda responds about her CT result.

## 2024-05-21 ENCOUNTER — TELEPHONE (OUTPATIENT)
Dept: FAMILY MEDICINE | Facility: CLINIC | Age: 59
End: 2024-05-21
Payer: COMMERCIAL

## 2024-05-21 NOTE — TELEPHONE ENCOUNTER
Spoke to pt and read her Gerda's result note.  CMP and GI appt scheduled.  Pt verbalizes understanding.

## 2024-05-21 NOTE — TELEPHONE ENCOUNTER
----- Message from Aimee Bezn sent at 5/21/2024  9:34 AM CDT -----  Type:  Patient Returning Call    Who Called:  pt  Who Left Message for Patient:  April  Does the patient know what this is regarding?:  N/A  Best Call Back Number:  [phone]    Additional Information:  Pt is returning a call in regards to a missed call from the office. Pt awaiting test results but needs to know what she was needing to get an appt scheduled for. Please call back and advise. Thanks!

## 2024-05-21 NOTE — TELEPHONE ENCOUNTER
Left message for pt informing her that I left her a MyChart message regarding her results and she can call me back at [phone] to discuss.

## 2024-05-22 ENCOUNTER — LAB VISIT (OUTPATIENT)
Dept: LAB | Facility: HOSPITAL | Age: 59
End: 2024-05-22
Attending: NURSE PRACTITIONER
Payer: COMMERCIAL

## 2024-05-22 ENCOUNTER — PATIENT MESSAGE (OUTPATIENT)
Dept: FAMILY MEDICINE | Facility: CLINIC | Age: 59
End: 2024-05-22
Payer: COMMERCIAL

## 2024-05-22 ENCOUNTER — OFFICE VISIT (OUTPATIENT)
Dept: GASTROENTEROLOGY | Facility: CLINIC | Age: 59
End: 2024-05-22
Payer: COMMERCIAL

## 2024-05-22 VITALS — WEIGHT: 164.88 LBS | HEIGHT: 64 IN | BODY MASS INDEX: 28.15 KG/M2

## 2024-05-22 DIAGNOSIS — K63.9 COLON ABNORMALITY: ICD-10-CM

## 2024-05-22 DIAGNOSIS — N39.0 FREQUENT UTI: ICD-10-CM

## 2024-05-22 DIAGNOSIS — K44.9 HIATAL HERNIA: ICD-10-CM

## 2024-05-22 DIAGNOSIS — N28.9 RENAL INSUFFICIENCY: ICD-10-CM

## 2024-05-22 DIAGNOSIS — Z87.19 HISTORY OF IBS: ICD-10-CM

## 2024-05-22 DIAGNOSIS — R10.819 LOWER ABDOMINAL TENDERNESS: ICD-10-CM

## 2024-05-22 DIAGNOSIS — R10.9 ABDOMINAL CRAMPING: ICD-10-CM

## 2024-05-22 DIAGNOSIS — R93.3 ABNORMAL CT SCAN, GASTROINTESTINAL TRACT: Primary | ICD-10-CM

## 2024-05-22 LAB
ALBUMIN SERPL BCP-MCNC: 4.2 G/DL (ref 3.5–5.2)
ALP SERPL-CCNC: 73 U/L (ref 55–135)
ALT SERPL W/O P-5'-P-CCNC: 14 U/L (ref 10–44)
ANION GAP SERPL CALC-SCNC: 7 MMOL/L (ref 8–16)
AST SERPL-CCNC: 15 U/L (ref 10–40)
BILIRUB SERPL-MCNC: 0.6 MG/DL (ref 0.1–1)
BUN SERPL-MCNC: 11 MG/DL (ref 6–20)
CALCIUM SERPL-MCNC: 9.8 MG/DL (ref 8.7–10.5)
CHLORIDE SERPL-SCNC: 107 MMOL/L (ref 95–110)
CO2 SERPL-SCNC: 26 MMOL/L (ref 23–29)
CREAT SERPL-MCNC: 1.1 MG/DL (ref 0.5–1.4)
EST. GFR  (NO RACE VARIABLE): 58.2 ML/MIN/1.73 M^2
GLUCOSE SERPL-MCNC: 90 MG/DL (ref 70–110)
POTASSIUM SERPL-SCNC: 4.2 MMOL/L (ref 3.5–5.1)
PROT SERPL-MCNC: 7.3 G/DL (ref 6–8.4)
SODIUM SERPL-SCNC: 140 MMOL/L (ref 136–145)

## 2024-05-22 PROCEDURE — 99214 OFFICE O/P EST MOD 30 MIN: CPT | Mod: S$GLB,,, | Performed by: NURSE PRACTITIONER

## 2024-05-22 PROCEDURE — 36415 COLL VENOUS BLD VENIPUNCTURE: CPT | Mod: PO | Performed by: NURSE PRACTITIONER

## 2024-05-22 PROCEDURE — 3008F BODY MASS INDEX DOCD: CPT | Mod: CPTII,S$GLB,, | Performed by: NURSE PRACTITIONER

## 2024-05-22 PROCEDURE — 99999 PR PBB SHADOW E&M-EST. PATIENT-LVL III: CPT | Mod: PBBFAC,,, | Performed by: NURSE PRACTITIONER

## 2024-05-22 PROCEDURE — 1159F MED LIST DOCD IN RCRD: CPT | Mod: CPTII,S$GLB,, | Performed by: NURSE PRACTITIONER

## 2024-05-22 PROCEDURE — 80053 COMPREHEN METABOLIC PANEL: CPT | Performed by: NURSE PRACTITIONER

## 2024-05-22 PROCEDURE — 1160F RVW MEDS BY RX/DR IN RCRD: CPT | Mod: CPTII,S$GLB,, | Performed by: NURSE PRACTITIONER

## 2024-05-22 NOTE — PROGRESS NOTES
"Subjective:       Patient ID: Lavern Hodges is a 58 y.o. female Body mass index is 28.31 kg/m².    Chief Complaint: Results (Abn ct scan)    This patient is established with RICHY Hahn NP, & myself (last in 2016).    Patient reports increased stress at home with son and ill pet. Saw PCP for UTI symptoms and reports her "kidney function was down" and had CT scan ordered. CT scan showed fatty infiltration in the colon. Patient was referred to us to further evaluate CT scan finding.  Patient reports she saw Dr. Soto for possible hiatal hernia repair. Patient reports he told her she needed to complete manometry testing and then follow-up and she would possibly need to see a different surgeon that specialized in robotic surgical repair of hiatal hernias. Patient reports she has not been back since manometry testing was completed.    Gastroesophageal Reflux  She complains of abdominal pain (chronic intermittent abdominal cramping (occurs rarely); bentyl prn helps- takes rarely & reports has not taken recently; rated 0/10 currently), belching (sometimes) and heartburn (occurs a couple times a month). She reports no chest pain, no choking, no coughing, no dysphagia, no globus sensation, no hoarse voice, no nausea or no sore throat. This is a chronic (several years ago) problem. The problem occurs occasionally. The problem has been rapidly improving (since taking dexilant). The heartburn does not wake her from sleep. The heartburn doesn't change with position. The symptoms are aggravated by caffeine (acidic foods, carbonated drinks, soda). Associated symptoms include weight loss (change in diet, trying to lose weight). Pertinent negatives include no fatigue or melena. Risk factors include NSAIDs, caffeine use, smoking/tobacco exposure and hiatal hernia. She has tried a PPI, a diet change and head elevation (dexilant 60 mg once daily, pepcid 40 mg nightly; tums prn rarely, carafate 1 gram BID PRN, changed her " diet- avoiding trigger foods; PAST: prilosec, protonix- had more heartburn with it, aciphex- mild relief, nexium) for the symptoms. The treatment provided significant relief. Past procedures include an EGD and esophageal manometry.     Review of Systems   Constitutional:  Positive for weight loss (change in diet, trying to lose weight). Negative for appetite change, chills, fatigue and fever.        Occasional NSAID use- maybe once a month   HENT:  Negative for hoarse voice, sore throat and trouble swallowing.    Respiratory:  Negative for cough, choking, chest tightness and shortness of breath.    Cardiovascular:  Negative for chest pain.   Gastrointestinal:  Positive for abdominal pain (chronic intermittent abdominal cramping (occurs rarely); bentyl prn helps- takes rarely & reports has not taken recently; rated 0/10 currently) and heartburn (occurs a couple times a month). Negative for anal bleeding, blood in stool, constipation, diarrhea, dysphagia, melena, nausea, rectal pain and vomiting.        Bowel movements are daily of stool rated 4 on bristol stool scale. Reports history of IBS; reports rare diarrhea, last had a few weeks ago; taking bentyl 20 mg PRN- has not taken recently. Reports less IBS flare-ups since taking dexilant   Genitourinary:  Negative for difficulty urinating, dysuria, flank pain, frequency, hematuria, pelvic pain and urgency.        Reports frequent UTIs   Neurological:  Negative for weakness.       Patient's last menstrual period was 03/16/2021 (approximate).    Past Medical History:   Diagnosis Date    Allergy     Anxiety     Asthma     due to allergies     GERD (gastroesophageal reflux disease)     Hepatic steatosis     History of nephrolithiasis     Irritable bowel syndrome     Kidney stone     lithotripsy 20 years ago    Peptic ulcer disease     PONV (postoperative nausea and vomiting)     Colin syndrome     Vitamin D deficiency      Past Surgical History:   Procedure Laterality  Date     SECTION      times 2    COLONOSCOPY N/A 2016    Procedure: COLONOSCOPY;  Surgeon: Sherman Arevalo MD;  Location: [facility] ENDO;  Service: Endoscopy;  Laterality: N/A; repeat in 10 years. clear.  internal/external hemorrhoids only.    ESOPHAGEAL MANOMETRY WITH MEASUREMENT OF IMPEDANCE N/A 2023    Procedure: MANOMETRY, ESOPHAGUS, WITH IMPEDANCE MEASUREMENT;  Surgeon: Sol Ibrahim MD;  Location: [facility] ENDO (4TH FLR);  Service: Endoscopy;  Laterality: N/A;  instr portal-alex alvero-tb  11/10-lvm for precall-MS  -precall complete-KPvt    ESOPHAGOGASTRODUODENOSCOPY  2016    Dr. Arevalo: Medium-sized hiatus hernia. a few gastric polyps biopsied; biopsy: Fundic gland polyps, no dysplasia    ESOPHAGOGASTRODUODENOSCOPY N/A 2021    Procedure: EGD (ESOPHAGOGASTRODUODENOSCOPY);  Surgeon: Sherman Arevalo MD;  Location: [facility];  Service: Endoscopy;  Laterality: N/A;    HYSTEROSCOPY      TUBAL LIGATION      UPPER GASTROINTESTINAL ENDOSCOPY  10/29/2010    Dr. Arevalo: mil schatzki ring- dilated, gastric polyp removed, otherwise normal findings. repeat PRN    WISDOM TOOTH EXTRACTION       Family History   Problem Relation Name Age of Onset    Ovarian cancer Maternal Aunt      Colon cancer Maternal Aunt          diagnosed in her 70's    Alzheimer's disease Maternal Grandmother      Crohn's disease Neg Hx      Ulcerative colitis Neg Hx      Esophageal cancer Neg Hx      Stomach cancer Neg Hx       Social History     Tobacco Use    Smoking status: Former     Current packs/day: 0.00     Types: Cigarettes     Quit date: 8/3/1994     Years since quittin.8     Passive exposure: Past    Smokeless tobacco: Never   Substance Use Topics    Alcohol use: No    Drug use: No     Wt Readings from Last 10 Encounters:   24 74.8 kg (164 lb 14.5 oz)   24 75 kg (165 lb 3.8 oz)   23 74.2 kg (163 lb 9.3 oz)   23 73.5 kg (162 lb)   23 75.8 kg (167 lb 1.7 oz)    08/24/23 77.5 kg (170 lb 13.7 oz)   07/17/23 78 kg (172 lb)   07/12/23 78.3 kg (172 lb 9.9 oz)   07/06/23 78.3 kg (172 lb 11.2 oz)   06/15/23 78.7 kg (173 lb 8 oz)     Lab Results   Component Value Date    WBC 4.02 12/23/2023    HGB 14.8 12/23/2023    HCT 43.7 12/23/2023    MCV 90 12/23/2023     12/23/2023     CMP  Sodium   Date Value Ref Range Status   05/09/2024 141 136 - 145 mmol/L Final     Potassium   Date Value Ref Range Status   05/09/2024 4.1 3.5 - 5.1 mmol/L Final     Chloride   Date Value Ref Range Status   05/09/2024 109 95 - 110 mmol/L Final     CO2   Date Value Ref Range Status   05/09/2024 23 23 - 29 mmol/L Final     Glucose   Date Value Ref Range Status   05/09/2024 123 (H) 70 - 110 mg/dL Final     BUN   Date Value Ref Range Status   05/09/2024 12 6 - 20 mg/dL Final     Creatinine   Date Value Ref Range Status   05/09/2024 1.4 0.5 - 1.4 mg/dL Final     Calcium   Date Value Ref Range Status   05/09/2024 9.8 8.7 - 10.5 mg/dL Final     Total Protein   Date Value Ref Range Status   05/09/2024 7.0 6.0 - 8.4 g/dL Final     Albumin   Date Value Ref Range Status   05/09/2024 4.3 3.5 - 5.2 g/dL Final     Total Bilirubin   Date Value Ref Range Status   05/09/2024 0.5 0.1 - 1.0 mg/dL Final     Comment:     For infants and newborns, interpretation of results should be based  on gestational age, weight and in agreement with clinical  observations.    Premature Infant recommended reference ranges:  Up to 24 hours.............<8.0 mg/dL  Up to 48 hours............<12.0 mg/dL  3-5 days..................<15.0 mg/dL  6-29 days.................<15.0 mg/dL       Alkaline Phosphatase   Date Value Ref Range Status   05/09/2024 78 55 - 135 U/L Final     AST   Date Value Ref Range Status   05/09/2024 13 10 - 40 U/L Final     ALT   Date Value Ref Range Status   05/09/2024 14 10 - 44 U/L Final     Anion Gap   Date Value Ref Range Status   05/09/2024 9 8 - 16 mmol/L Final     eGFR if    Date Value Ref  Range Status   12/09/2021 >60.0 >60 mL/min/1.73 m^2 Final     eGFR if non    Date Value Ref Range Status   12/09/2021 >60.0 >60 mL/min/1.73 m^2 Final     Comment:     Calculation used to obtain the estimated glomerular filtration  rate (eGFR) is the CKD-EPI equation.        Lab Results   Component Value Date    TSH 2.100 12/23/2023     Lab Results   Component Value Date    DPUEZIVR10 201 (L) 12/23/2023 8/24/2023 magnesium WNL    Reviewed prior medical records including radiology report of 5/18/2024 and 12/3/2022 CT renal stone abdomen pelvis scans; 9/7/2023 esophagram; 7/10/2019 abdominal ultrasound & CT renal stone abdomen pelvis; 11/17/2023 esophageal manometry WNL; & endoscopy history (see surgical history).    Objective:      Physical Exam  Vitals and nursing note reviewed.   Constitutional:       General: She is not in acute distress.     Appearance: Normal appearance. She is well-developed. She is not diaphoretic.   HENT:      Mouth/Throat:      Lips: Pink. No lesions.      Mouth: Mucous membranes are moist. No oral lesions.      Tongue: No lesions.      Pharynx: Oropharynx is clear. No pharyngeal swelling or posterior oropharyngeal erythema.   Eyes:      General: No scleral icterus.     Conjunctiva/sclera: Conjunctivae normal.   Pulmonary:      Effort: Pulmonary effort is normal. No respiratory distress.      Breath sounds: Normal breath sounds. No wheezing.   Abdominal:      General: Bowel sounds are normal. There is no distension or abdominal bruit.      Palpations: Abdomen is soft. Abdomen is not rigid. There is no mass.      Tenderness: There is abdominal tenderness (mild) in the right lower quadrant and left lower quadrant. There is no guarding or rebound.   Skin:     General: Skin is warm and dry.      Coloration: Skin is not jaundiced or pale.      Findings: No erythema or rash.   Neurological:      Mental Status: She is alert and oriented to person, place, and time.   Psychiatric:   "       Behavior: Behavior normal.         Thought Content: Thought content normal.         Judgment: Judgment normal.         Assessment:       1. Abnormal CT scan, gastrointestinal tract    2. Colon abnormality    3. Hiatal hernia    4. Lower abdominal tenderness    5. Abdominal cramping    6. History of IBS    7. Frequent UTI          Plan:       Abnormal CT scan, gastrointestinal tract: Colon abnormality  - reviewed 5/18/2024 CT scan result and also prior imaging radiology reports and noted similar finding "The colon demonstrates a large quantity of fat density particularly within the cecal wall and transverse colon wall as can be seen with recurrent inflammation." on 7/10/2019 CT renal stone abdomen pelvis scan.  - schedule Colonoscopy, discussed procedure with the patient, including risks and benefits, patient verbalized understanding  -     Calprotectin, Stool; Future; Expected date: 05/22/2024    Hiatal hernia  - Recommend follow-up with general surgery for continued evaluation and management.  -  CONTINUE   dexlansoprazole (DEXILANT) 60 mg capsule; Take 1 capsule (60 mg total) by mouth once daily.  - CONTINUE PEPCID 40 MG NIGHTLY AS DIRECTED  - avoid/minimize use of NSAIDs- since they can cause GI upset, bleeding and/or ulcers. If NSAID must be taken, recommend take with food.    Lower abdominal tenderness  - schedule Colonoscopy, discussed procedure with the patient, including risks and benefits, patient verbalized understanding    Abdominal cramping  - schedule Colonoscopy, discussed procedure with the patient, including risks and benefits, patient verbalized understanding    History of IBS  - continue bentyl 20 mg QID prn as directed    Frequent UTI  Recommend follow-up with urology, established with Dr. Cruz, for continued evaluation and management.    Follow up in about 1 month (around 6/22/2024), or if symptoms worsen or fail to improve.    If no improvement in symptoms or symptoms worsen, " call/follow-up at clinic or go to ER.        45 minutes of total time spent on the encounter, which includes face to face time and non-face to face time preparing to see the patient (eg, review of tests), Obtaining and/or reviewing separately obtained history, Documenting clinical information in the electronic or other health record, Independently interpreting results (not separately reported) and communicating results to the patient/family/caregiver, or Care coordination (not separately reported).

## 2024-05-25 DIAGNOSIS — R12 HEARTBURN: ICD-10-CM

## 2024-05-25 DIAGNOSIS — K44.9 HIATAL HERNIA: ICD-10-CM

## 2024-05-25 RX ORDER — DEXLANSOPRAZOLE 60 MG/1
60 CAPSULE, DELAYED RELEASE ORAL
Qty: 30 CAPSULE | Refills: 2 | Status: CANCELLED | OUTPATIENT
Start: 2024-05-25

## 2024-05-27 RX ORDER — DEXLANSOPRAZOLE 60 MG/1
60 CAPSULE, DELAYED RELEASE ORAL
Qty: 30 CAPSULE | Refills: 11 | Status: SHIPPED | OUTPATIENT
Start: 2024-05-27 | End: 2025-05-27

## 2024-05-28 ENCOUNTER — TELEPHONE (OUTPATIENT)
Dept: GASTROENTEROLOGY | Facility: CLINIC | Age: 59
End: 2024-05-28
Payer: COMMERCIAL

## 2024-05-28 NOTE — TELEPHONE ENCOUNTER
----- Message from Isabella Cherry, Patient Care Assistant sent at 5/28/2024 10:17 AM CDT -----  Regarding: advice  Contact: pt  Type: Needs Medical Advice    Who Called:  pt     Best Call Back Number: [phone] (home)     Additional Information: pt states she would like a callback regarding dexlansoprazole (DEXILANT) 60 mg capsule. Please call to advise. Thanks!

## 2024-05-28 NOTE — TELEPHONE ENCOUNTER
Spoke to pt and she says she received a message from the pharmacy telling her her Rx for Dexilant was denied. Called [facility] and pharmacist said everything is good and they will fill the Rx for pt. Vmail for pt left stating prescription was being prepared for . Call back # provided.

## 2024-05-31 ENCOUNTER — LAB VISIT (OUTPATIENT)
Dept: LAB | Facility: HOSPITAL | Age: 59
End: 2024-05-31
Attending: NURSE PRACTITIONER
Payer: COMMERCIAL

## 2024-05-31 DIAGNOSIS — R93.3 ABNORMAL CT SCAN, GASTROINTESTINAL TRACT: ICD-10-CM

## 2024-05-31 DIAGNOSIS — K63.9 COLON ABNORMALITY: ICD-10-CM

## 2024-05-31 PROCEDURE — 83993 ASSAY FOR CALPROTECTIN FECAL: CPT | Performed by: NURSE PRACTITIONER

## 2024-06-03 LAB — CALPROTECTIN STL-MCNT: 270 MCG/G

## 2024-07-25 ENCOUNTER — TELEPHONE (OUTPATIENT)
Dept: FAMILY MEDICINE | Facility: CLINIC | Age: 59
End: 2024-07-25
Payer: COMMERCIAL

## 2024-07-25 NOTE — TELEPHONE ENCOUNTER
Spoke to pt and advised her that Gerda does not have any appts today or tomorrow.  Offered pt and e-visit and she declined stating she will go to urgent care.

## 2024-07-25 NOTE — TELEPHONE ENCOUNTER
----- Message from Keturah Garcia sent at 7/25/2024 10:20 AM CDT -----  Contact: pt  Type:  Same Day Appointment Request    Caller is requesting a same day appointment.  Caller declined first available appointment listed below.    Name of Caller:pt    When is the first available appointment? Monday 29 July    Symptoms: sore throat, sinus congestion, low fever,     Best Call Back Number:[phone]    Additional Information: please call to advise  Thanks

## 2024-07-27 ENCOUNTER — OFFICE VISIT (OUTPATIENT)
Dept: URGENT CARE | Facility: CLINIC | Age: 59
End: 2024-07-27
Payer: COMMERCIAL

## 2024-07-27 VITALS
BODY MASS INDEX: 28 KG/M2 | SYSTOLIC BLOOD PRESSURE: 124 MMHG | DIASTOLIC BLOOD PRESSURE: 69 MMHG | HEIGHT: 64 IN | RESPIRATION RATE: 17 BRPM | TEMPERATURE: 99 F | HEART RATE: 123 BPM | OXYGEN SATURATION: 98 % | WEIGHT: 164 LBS

## 2024-07-27 DIAGNOSIS — U07.1 COVID: Primary | ICD-10-CM

## 2024-07-27 DIAGNOSIS — J02.9 SORE THROAT: ICD-10-CM

## 2024-07-27 LAB
CTP QC/QA: YES
SARS-COV-2 AG RESP QL IA.RAPID: POSITIVE

## 2024-07-27 PROCEDURE — 87811 SARS-COV-2 COVID19 W/OPTIC: CPT | Mod: QW,S$GLB,, | Performed by: NURSE PRACTITIONER

## 2024-07-27 PROCEDURE — 99213 OFFICE O/P EST LOW 20 MIN: CPT | Mod: S$GLB,,, | Performed by: NURSE PRACTITIONER

## 2024-07-27 RX ORDER — AZELASTINE 1 MG/ML
1 SPRAY, METERED NASAL 2 TIMES DAILY
Qty: 30 ML | Refills: 0 | Status: SHIPPED | OUTPATIENT
Start: 2024-07-27 | End: 2025-07-27

## 2024-07-27 RX ORDER — CLOBETASOL PROPIONATE 0.5 MG/G
CREAM TOPICAL 2 TIMES DAILY
COMMUNITY
Start: 2024-07-12

## 2024-07-27 RX ORDER — ALBUTEROL SULFATE 90 UG/1
2 AEROSOL, METERED RESPIRATORY (INHALATION) EVERY 6 HOURS PRN
Qty: 18 G | Refills: 0 | Status: SHIPPED | OUTPATIENT
Start: 2024-07-27 | End: 2025-07-27

## 2024-07-27 RX ORDER — NIRMATRELVIR AND RITONAVIR 300-100 MG
KIT ORAL
Qty: 30 TABLET | Refills: 0 | Status: SHIPPED | OUTPATIENT
Start: 2024-07-27 | End: 2024-08-01

## 2024-07-27 NOTE — PATIENT INSTRUCTIONS

## 2024-07-27 NOTE — PROGRESS NOTES
"Subjective:      Patient ID: Lavern Hodges is a 59 y.o. female.    Vitals:  height is 5' 4" (1.626 m) and weight is 74.4 kg (164 lb). Her oral temperature is 98.7 °F (37.1 °C). Her blood pressure is 124/69 and her pulse is 123 (abnormal). Her respiration is 17 and oxygen saturation is 98%.     Chief Complaint: Sore Throat and Fever    Patient reports Fatigue, Chills, Fever (Temporal), Nasal Congestion , Sore throat (throat felt like it was burning)for 3 days. Patient stated her symptoms get worst at night. Nasal sprays and OTC meds have been taken. Pain scale is 5/10    Sore Throat   This is a new problem. The current episode started in the past 7 days. The problem has been unchanged. There has been no fever. The pain is at a severity of 5/10. The pain is moderate. Associated symptoms include congestion. Pertinent negatives include no abdominal pain, coughing, diarrhea, drooling, ear discharge, ear pain, headaches, hoarse voice, plugged ear sensation, neck pain, shortness of breath, stridor, trouble swallowing or vomiting. She has had no exposure to strep or mono. She has tried acetaminophen for the symptoms. The treatment provided no relief.       HENT:  Positive for congestion and sore throat. Negative for ear pain, ear discharge, drooling and trouble swallowing.    Neck: Negative for neck pain.   Respiratory:  Negative for cough, shortness of breath and stridor.    Gastrointestinal:  Negative for abdominal pain, vomiting and diarrhea.   Neurological:  Negative for headaches.      Objective:     Physical Exam   Constitutional: She is oriented to person, place, and time. She appears well-developed. She is cooperative.  Non-toxic appearance. She does not appear ill. No distress.   HENT:   Head: Normocephalic and atraumatic.   Ears:   Right Ear: Hearing, tympanic membrane, external ear and ear canal normal.   Left Ear: Hearing, tympanic membrane, external ear and ear canal normal.   Nose: Nose normal. No mucosal " edema, rhinorrhea or nasal deformity. No epistaxis. Right sinus exhibits no maxillary sinus tenderness and no frontal sinus tenderness. Left sinus exhibits no maxillary sinus tenderness and no frontal sinus tenderness.   Mouth/Throat: Uvula is midline, oropharynx is clear and moist and mucous membranes are normal. No trismus in the jaw. Normal dentition. No uvula swelling. No oropharyngeal exudate, posterior oropharyngeal edema or posterior oropharyngeal erythema.   Eyes: Conjunctivae and lids are normal. No scleral icterus.   Neck: Trachea normal and phonation normal. Neck supple. No edema present. No erythema present. No neck rigidity present.   Cardiovascular: Normal rate, regular rhythm, normal heart sounds and normal pulses.   Pulmonary/Chest: Effort normal and breath sounds normal. No respiratory distress. She has no decreased breath sounds. She has no rhonchi.   Abdominal: Normal appearance.   Musculoskeletal: Normal range of motion.         General: No deformity. Normal range of motion.   Neurological: She is alert and oriented to person, place, and time. She exhibits normal muscle tone. Coordination normal.   Skin: Skin is warm, dry, intact, not diaphoretic and not pale.   Psychiatric: Her speech is normal and behavior is normal. Judgment and thought content normal.   Nursing note and vitals reviewed.      Assessment:     1. COVID    2. Sore throat      Results for orders placed or performed in visit on 07/27/24   SARS Coronavirus 2 Antigen, POCT Manual Read   Result Value Ref Range    SARS Coronavirus 2 Antigen Positive (A) Negative     Acceptable Yes          Plan:       COVID  -     nirmatrelvir-ritonavir (PAXLOVID) 300 mg (150 mg x 2)-100 mg copackaged tablets (EUA); Take 3 tablets by mouth 2 (two) times daily. Each dose contains 2 nirmatrelvir (pink tablets) and 1 ritonavir (white tablet). Take all 3 tablets together  Dispense: 30 tablet; Refill: 0  -     albuterol (PROVENTIL HFA) 90  mcg/actuation inhaler; Inhale 2 puffs into the lungs every 6 (six) hours as needed for Wheezing. Rescue  Dispense: 18 g; Refill: 0  -     azelastine (ASTELIN) 137 mcg (0.1 %) nasal spray; 1 spray (137 mcg total) by Nasal route 2 (two) times daily.  Dispense: 30 mL; Refill: 0    Sore throat  -     SARS Coronavirus 2 Antigen, POCT Manual Read      Patient Instructions   INSTRUCTIONS:  - Rest.  - Drink plenty of fluids.  - Take Tylenol and/or Ibuprofen as directed as needed for fever/pain.  Do not take more than the recommended dose.  - follow up with your PCP within the next 1-2 weeks as needed.  - You must understand that you have received an Urgent Care treatment only and that you may be released before all of your medical problems are known or treated.   - You, the patient, will arrange for follow up care as instructed.   - If your condition worsens or fails to improve we recommend that you receive another evaluation at the ER immediately or contact your PCP to discuss your concerns.   - You can call [phone] or [phone] to help schedule an appointment with the appropriate provider.     -If you smoke cigarettes, it would be beneficial for you to stop.      Monitor for new or worsening symptoms    OTC for symptom control    Recommend follow up with PCP/Pediatrician if symptoms are worsening

## 2024-07-29 ENCOUNTER — TELEPHONE (OUTPATIENT)
Dept: FAMILY MEDICINE | Facility: CLINIC | Age: 59
End: 2024-07-29
Payer: COMMERCIAL

## 2024-07-29 RX ORDER — AZITHROMYCIN 250 MG/1
TABLET, FILM COATED ORAL
Qty: 6 TABLET | Refills: 0 | Status: SHIPPED | OUTPATIENT
Start: 2024-07-29 | End: 2024-08-03

## 2024-07-29 NOTE — TELEPHONE ENCOUNTER
Pt was dx with covid on 7/27/24.  Was rx paxlovid and a nasal spray.  Pt did not take either because she did not think she needed it.  Has not been taking any OTC decongestants, but has been Jagjit-Synephrine.  Now states her ear has been hurting since yesterday and she is wondering if she needs abx.

## 2024-07-29 NOTE — TELEPHONE ENCOUNTER
----- Message from Karen Mcfarland sent at 7/29/2024  8:04 AM CDT -----  Regarding: Call back  Type:  Needs Medical Advice    Who Called: Pt    Symptoms (please be specific): Ear pain     How long has patient had these symptoms:  2days    Pharmacy name and phone #:    [id number] - [facility] [id number] [place] [address] AT [facility] [address]  [id number] [place] [id number]  Phone: [phone] Fax: [phone]    Would the patient rather a call back or a response via [facility]? Call back    Best Call Back Number: [phone]    Additional Information: Pt sts she is have a ear pain and is requesting a call back. Thank you

## 2024-07-31 ENCOUNTER — TELEPHONE (OUTPATIENT)
Dept: GASTROENTEROLOGY | Facility: CLINIC | Age: 59
End: 2024-07-31
Payer: COMMERCIAL

## 2024-07-31 NOTE — TELEPHONE ENCOUNTER
----- Message from Jaqueline Wei sent at 7/31/2024  4:08 PM CDT -----  Type:  Needs Medical Advice    Who Called:  Pt    Would the patient rather a call back or a response via [facility]?  Call back    Best Call Back Number:  [phone]    Additional Information:  Asking to speak with someone about procedure that is Monday.  Please call back to advise. Thanks!

## 2024-08-01 ENCOUNTER — TELEPHONE (OUTPATIENT)
Dept: FAMILY MEDICINE | Facility: CLINIC | Age: 59
End: 2024-08-01
Payer: COMMERCIAL

## 2024-08-01 NOTE — TELEPHONE ENCOUNTER
Pt states after taking her zpack, she is still having congestion.  She is taking her albuterol inhaler Q6hrs and robitussin.  She is wondering if she needs a steroid.

## 2024-08-01 NOTE — TELEPHONE ENCOUNTER
She has Covid. She was diagnosed on 7/27.  She is going to have congestion, fatigue, headache, etc.  This can last up to 10 days. Antibiotics are not going to help because it is a virus. Steroids are not recommended, they deplete your immune system and can make the Covid virus worse and last longer.  She should improve with time.

## 2024-08-02 ENCOUNTER — OFFICE VISIT (OUTPATIENT)
Dept: URGENT CARE | Facility: CLINIC | Age: 59
End: 2024-08-02
Payer: COMMERCIAL

## 2024-08-02 ENCOUNTER — TELEPHONE (OUTPATIENT)
Dept: GASTROENTEROLOGY | Facility: CLINIC | Age: 59
End: 2024-08-02
Payer: COMMERCIAL

## 2024-08-02 VITALS
WEIGHT: 164 LBS | HEIGHT: 64 IN | OXYGEN SATURATION: 98 % | DIASTOLIC BLOOD PRESSURE: 80 MMHG | RESPIRATION RATE: 17 BRPM | TEMPERATURE: 98 F | SYSTOLIC BLOOD PRESSURE: 129 MMHG | BODY MASS INDEX: 28 KG/M2 | HEART RATE: 81 BPM

## 2024-08-02 DIAGNOSIS — R06.02 SHORTNESS OF BREATH: ICD-10-CM

## 2024-08-02 DIAGNOSIS — U07.1 COVID-19: ICD-10-CM

## 2024-08-02 DIAGNOSIS — J02.9 ACUTE PHARYNGITIS, UNSPECIFIED ETIOLOGY: Primary | ICD-10-CM

## 2024-08-02 DIAGNOSIS — J02.9 SORE THROAT: ICD-10-CM

## 2024-08-02 LAB
CTP QC/QA: YES
MOLECULAR STREP A: NEGATIVE

## 2024-08-02 RX ORDER — PREDNISONE 20 MG/1
20 TABLET ORAL 2 TIMES DAILY
Qty: 10 TABLET | Refills: 0 | Status: SHIPPED | OUTPATIENT
Start: 2024-08-02 | End: 2024-08-07

## 2024-08-02 NOTE — TELEPHONE ENCOUNTER
----- Message from Naresh Goldstein sent at 8/2/2024  7:49 AM CDT -----  Type: Needs Medical Advice    Who Called:  Pt    Best Call Back Number: [phone]    Additional Information: Pt states that she needs to reschedule 8/5 procedure. Please call back to advise, Thanks!

## 2024-08-02 NOTE — TELEPHONE ENCOUNTER
Spoke to pt, pt stated she tested positive for COVID and will need to reschedule. Rescheduled procedure, pt verbalized understanding of new procedure date.

## 2024-08-02 NOTE — PROGRESS NOTES
"Subjective:      Patient ID: Lavern Hodges is a 59 y.o. female.    Vitals:  height is 5' 4" (1.626 m) and weight is 74.4 kg (164 lb). Her oral temperature is 98.2 °F (36.8 °C). Her blood pressure is 129/80 and her pulse is 81. Her respiration is 17 and oxygen saturation is 98%.     Chief Complaint: Sore Throat    Pt came in today with complaints of a sore throat, difficulty swallowing and post nasal drip that started today. She has been talking several OTC medications for her symptoms. Pt diagnosed with COVID 7/27/24 here - was prescribed Paxlovid but did not fill. Was prescribed Astelin nasal spray - has not used it. Was improving but had more congestion earlier in week - spoke to PCP who sent in Azithromycin which she is taking. Was improving, awoke in night with sore throat, difficulty swallowing, postnasal drainage. No new fever. Mild cough. Intermittent episodes of SOB resolved with Albuterol.    Sore Throat   This is a new problem. The current episode started today. The problem has been gradually worsening. Neither side of throat is experiencing more pain than the other. There has been no fever. The pain is at a severity of 3/10. The pain is mild. Associated symptoms include congestion, coughing, shortness of breath and trouble swallowing. Pertinent negatives include no abdominal pain, diarrhea, drooling, ear pain, headaches, neck pain, stridor or vomiting. She has had no exposure to strep or mono. She has tried acetaminophen and cool liquids for the symptoms. The treatment provided no relief.     Constitution: Negative for chills, sweating, fatigue, fever and unexpected weight change.   HENT:  Positive for congestion, sore throat and trouble swallowing. Negative for ear pain, drooling and voice change.    Neck: Negative for neck pain, neck stiffness, painful lymph nodes and neck swelling.   Cardiovascular:  Negative for chest pain, leg swelling, palpitations, sob on exertion and passing out.   Eyes:  Negative " for eye pain, eye redness, photophobia, double vision and blurred vision.   Respiratory:  Positive for cough and shortness of breath. Negative for chest tightness, sputum production, bloody sputum, stridor and wheezing.    Gastrointestinal:  Negative for abdominal pain, abdominal bloating, nausea, vomiting, constipation, diarrhea and heartburn.   Musculoskeletal:  Negative for joint pain, joint swelling, back pain, muscle cramps and muscle ache.   Skin:  Negative for rash and hives.   Allergic/Immunologic: Negative for hives and itching.   Neurological:  Negative for dizziness, light-headedness, passing out, loss of balance, headaches, altered mental status, loss of consciousness and seizures.   Hematologic/Lymphatic: Negative for swollen lymph nodes.   Psychiatric/Behavioral:  Negative for altered mental status and nervous/anxious. The patient is not nervous/anxious.       Objective:     Physical Exam   Constitutional: She is oriented to person, place, and time. She appears well-developed. She is cooperative.  Non-toxic appearance. She does not appear ill. No distress.   HENT:   Head: Normocephalic and atraumatic.   Ears:   Right Ear: Hearing, tympanic membrane, external ear and ear canal normal.   Left Ear: Hearing, tympanic membrane, external ear and ear canal normal.   Nose: Nose normal. No mucosal edema, rhinorrhea or nasal deformity. No epistaxis. Right sinus exhibits no maxillary sinus tenderness and no frontal sinus tenderness. Left sinus exhibits no maxillary sinus tenderness and no frontal sinus tenderness.   Mouth/Throat: Uvula is midline and mucous membranes are normal. No trismus in the jaw. Normal dentition. No uvula swelling. Posterior oropharyngeal erythema (mild erythema to posterior pharynx/uvula; airway widely patent; normal voice, normal neck ROM, handling secretions well) present. No oropharyngeal exudate or posterior oropharyngeal edema.   Eyes: Conjunctivae and lids are normal. No scleral  icterus.   Neck: Trachea normal and phonation normal. Neck supple. No edema present. No erythema present. No neck rigidity present.   Cardiovascular: Normal rate, regular rhythm, normal heart sounds and normal pulses.   Pulmonary/Chest: Effort normal and breath sounds normal. No respiratory distress. She has no decreased breath sounds. She has no rhonchi.   Abdominal: Normal appearance.   Musculoskeletal: Normal range of motion.         General: No deformity. Normal range of motion.   Neurological: She is alert and oriented to person, place, and time. She exhibits normal muscle tone. Coordination normal.   Skin: Skin is warm, dry, intact, not diaphoretic and not pale.   Psychiatric: Her speech is normal and behavior is normal. Judgment and thought content normal.   Nursing note and vitals reviewed.    Results for orders placed or performed in visit on 08/02/24   POCT Strep A, Molecular   Result Value Ref Range    Molecular Strep A, POC Negative Negative     Acceptable Yes     XR CHEST PA AND LATERAL    Result Date: 8/2/2024  EXAMINATION: XR CHEST PA AND LATERAL CLINICAL HISTORY: Shortness of breath for a week. TECHNIQUE: 2 views of the chest COMPARISON: 06/03/2021 FINDINGS: The lungs demonstrate no evidence of lobar type consolidation, visible pneumothorax, or pleural fluid.  The cardiac silhouette is within normal limits for size. There is a moderate hiatal hernia present.  No acute displaced fracture is seen.     1. No evidence lobar type consolidation or acute cardiac decompensation noted. 2. Moderate hiatal hernia again noted. Electronically signed by: Tarik Durand MD Date:    08/02/2024 Time:    11:24         Assessment:     1. Acute pharyngitis, unspecified etiology    2. Sore throat    3. Shortness of breath    4. COVID-19        Plan:     CXR normal. Strep neg. Complete Azithromycin.     Can try Astelin nasal spray for postnasal drip, Claritin PO at home. Prednisone PO prescribed - she is on  day 8 of COVID and I feel a short course of steroids would be safe and beneficial at this time for her throat inflammation.    Acute pharyngitis, unspecified etiology  -     predniSONE (DELTASONE) 20 MG tablet; Take 1 tablet (20 mg total) by mouth 2 (two) times daily. for 5 days  Dispense: 10 tablet; Refill: 0    Sore throat  -     POCT Strep A, Molecular    Shortness of breath  -     XR CHEST PA AND LATERAL; Future; Expected date: 2024    COVID-19      Patient Instructions   For lab or Xray testing ordered to be completed after your visit, please proceed to either of the followin) [facility] at [address] in [place]  (7a-5p M-F)  2) [facility] - [address] in [place] (M-F)  3) [facility] - [address] [facility] [address] facility (M-F)  4) [facility] - [address] ([address] in [place] - (7a-6p - and 7a-1p Saturday)    The orders are already in the system. You do not have to bring anything with you except your photo ID. Once the test is complete, you are ok to leave the facility. You will be contacted by phone or patient portal with results and any new instructions.      Try Astelin nasal spray first. If not improving, take Claritin.     You must understand that you've received an Urgent Care treatment only and that you may be released before all your medical problems are known or treated. You, the patient, will arrange for follow up care as instructed.    Follow up with your PCP or specialty clinic as directed if not improved or as needed. You can call [phone] to schedule an appointment with the appropriate provider.      You, the patient, will arrange for follow up care as instructed.     If your condition worsens or fails to improve we recommend that you receive another evaluation at the ER immediately or contact your PCP to discuss your concerns.     Patient aware of treatment plan and verbalized  understanding.

## 2024-08-02 NOTE — PATIENT INSTRUCTIONS
- Subjective


Encounter Start Date: 07/11/18


Encounter Start Time: 12:30


Subjective: pt up in bed no complains





- Objective


Resuscitation Status: 


 











Resuscitation Status           DNR:Do Not Resuscitate














Vital Signs & Weight: 


 Vital Signs (12 hours)











  Temp Pulse Resp BP BP Pulse Ox


 


 07/11/18 11:30  98.6 F  94  18   133/76  96


 


 07/11/18 08:33   89   168/98 H  


 


 07/11/18 08:25  97.9 F  92  18   121/71  100


 


 07/11/18 06:05       90 L


 


 07/11/18 06:00    24 H    87 L








 Weight











Admit Weight                   129 lb


 


Weight                         119 lb 8 oz














I&O: 


 











 07/10/18 07/11/18 07/12/18





 06:59 06:59 06:59


 


Intake Total 830 1220 


 


Output Total 520 400 


 


Balance 310 820 











Result Diagrams: 


 07/11/18 04:59





 07/11/18 04:59





Phys Exam





- Physical Examination


HEENT: PERRLA, moist MMs, sclera anicteric, TM's clear, oral pharynx no lesions

, 2+ tonsils


Neck: no nodes, no JVD, supple, full ROM


Respiratory: no wheezing, no rales, no rhonchi, wheezing present, clear to 

auscultation bilateral


Cardiovascular: RRR, no significant murmur, no rub, gallop, irregular


Gastrointestinal: soft, non-tender, no distention, positive bowel sounds





Dx/Plan


(1) Metabolic encephalopathy


Code(s): G93.41 - METABOLIC ENCEPHALOPATHY   Status: Acute   





(2) Fever


Code(s): R50.9 - FEVER, UNSPECIFIED   Status: Acute   





(3) Pneumonia


Code(s): J18.9 - PNEUMONIA, UNSPECIFIED ORGANISM   Status: Acute   





(4) Systolic and diastolic CHF, acute


Code(s): I50.41 - ACUTE COMBINED SYSTOLIC AND DIASTOLIC (CONGESTIVE) HRT FAIL   

Status: Acute   





(5) Systolic heart failure


Code(s): I50.20 - UNSPECIFIED SYSTOLIC (CONGESTIVE) HEART FAILURE   Status: 

Acute   





(6) Weight loss


Status: Acute   





- Plan


continue abx for now


-: pt is afebrile


-: pt feels better today. mild steroids added


-: called son yest left message





* .








Review of Systems





- Review of Systems


ENT: negative: Ear Pain, Ear Discharge, Nose Pain, Nose Discharge, Nose 

Congestion, Mouth Pain, Mouth Swelling, Throat Pain, Throat Swelling, Other


Respiratory: negative: Cough, Dry, Shortness of Breath, Hemoptysis, SOB with 

Excertion, Pleuritic Pain, Sputum, Wheezing


Cardiovascular: negative: chest pain, palpitations, orthopnea, paroxysmal 

nocturnal dyspnea, edema, light headedness, other


Gastrointestinal: negative: Nausea, Vomiting, Abdominal Pain, Diarrhea, 

Constipation, Melena, Hematochezia, Other





- Medications/Allergies


Allergies/Adverse Reactions: 


 Allergies











Allergy/AdvReac Type Severity Reaction Status Date / Time


 


Iodine and Iodide Containing Allergy Severe SEVERE Verified 05/25/17 13:52





Produc   ITCHING  


 


shellfish derived Allergy Severe SEVERE Verified 05/25/17 13:52





   VOMITING  











Medications: 


 Current Medications





Acetaminophen (Tylenol)  650 mg PO Q4H PRN


   PRN Reason: Headache/Fever or Pain


   Last Admin: 07/11/18 11:33 Dose:  650 mg


Albuterol/Ipratropium (Duoneb)  3 ml NEB Q4H PRN


   PRN Reason:  SOB


Apixaban (Eliquis)  5 mg PO BID [facility]


   Last Admin: 07/11/18 08:33 Dose:  5 mg


Carvedilol (Coreg)  6.25 mg PO BID-WM [facility]


   Last Admin: 07/11/18 08:33 Dose:  6.25 mg


Famotidine (Pepcid)  20 mg PO DAILY [facility]


   Last Admin: 07/11/18 08:33 Dose:  20 mg


Furosemide (Lasix)  20 mg SLOW IVP 1100 [facility]


   Last Admin: 07/11/18 11:32 Dose:  20 mg


Furosemide (Lasix)  40 mg PO DAILY-AC [facility]


   Stop: 07/15/18 23:59


Furosemide (Lasix)  40 mg PO 1048 [facility]


   Stop: 07/12/18 12:48


Hydralazine HCl (Apresoline)  25 mg PO TID [facility]


   Last Admin: 07/11/18 15:32 Dose:  25 mg


Levofloxacin (Levaquin)  500 mg PO 0600 [facility]


   Last Admin: 07/11/18 06:03 Dose:  500 mg


Levothyroxine Sodium (Synthroid)  50 mcg PO HS [facility]


   Last Admin: 07/10/18 21:10 Dose:  50 mcg


Lisinopril (Zestril)  10 mg PO BID [facility]


   Last Admin: 07/11/18 08:33 Dose:  10 mg


Pravastatin Sodium (Pravachol)  40 mg PO HS ANTHONY


   Last Admin: 07/10/18 21:11 Dose:  40 mg


Senna (Senokot)  2 tab PO HSPRN PRN


   PRN Reason: Constipation


Sodium Chloride (Flush - Normal Saline)  10 ml IVF Q12HR ANTHONY


   Last Admin: 07/11/18 08:33 Dose:  10 ml


Sodium Chloride (Flush - Normal Saline)  10 ml IVF PRN PRN


   PRN Reason: Saline Flush


   Last Admin: 07/10/18 21:27 Dose:  10 ml For lab or Xray testing ordered to be completed after your visit, please proceed to either of the followin) [facility] at [address], [address] in [place]  (7a-5p -)  2) [facility] - [address] in [place] (M-)  3) [facility] - [address] [facility] facility (M-)  4) [facility] - [address] ([address] in [place] - (7a-6p - and 7a-1p Saturday)    The orders are already in the system. You do not have to bring anything with you except your photo ID. Once the test is complete, you are ok to leave the facility. You will be contacted by phone or patient portal with results and any new instructions.      Try Astelin nasal spray first. If not improving, take Claritin.     You must understand that you've received an Urgent Care treatment only and that you may be released before all your medical problems are known or treated. You, the patient, will arrange for follow up care as instructed.    Follow up with your PCP or specialty clinic as directed if not improved or as needed. You can call [phone] to schedule an appointment with the appropriate provider.      You, the patient, will arrange for follow up care as instructed.     If your condition worsens or fails to improve we recommend that you receive another evaluation at the ER immediately or contact your PCP to discuss your concerns.     Patient aware of treatment plan and verbalized understanding.

## 2024-08-11 ENCOUNTER — OFFICE VISIT (OUTPATIENT)
Dept: URGENT CARE | Facility: CLINIC | Age: 59
End: 2024-08-11
Payer: COMMERCIAL

## 2024-08-11 VITALS
DIASTOLIC BLOOD PRESSURE: 81 MMHG | TEMPERATURE: 98 F | WEIGHT: 164 LBS | OXYGEN SATURATION: 98 % | HEIGHT: 64 IN | HEART RATE: 94 BPM | BODY MASS INDEX: 28 KG/M2 | SYSTOLIC BLOOD PRESSURE: 135 MMHG | RESPIRATION RATE: 15 BRPM

## 2024-08-11 DIAGNOSIS — R09.81 SINUS CONGESTION: ICD-10-CM

## 2024-08-11 DIAGNOSIS — R09.82 POSTNASAL DRIP: ICD-10-CM

## 2024-08-11 DIAGNOSIS — J02.9 SORE THROAT: ICD-10-CM

## 2024-08-11 DIAGNOSIS — R05.9 COUGH, UNSPECIFIED TYPE: Primary | ICD-10-CM

## 2024-08-11 PROCEDURE — 99213 OFFICE O/P EST LOW 20 MIN: CPT | Mod: ,,, | Performed by: PHYSICIAN ASSISTANT

## 2024-08-11 RX ORDER — NEBULIZER AND COMPRESSOR
EACH MISCELLANEOUS
Qty: 1 EACH | Refills: 0 | Status: SHIPPED | OUTPATIENT
Start: 2024-08-11

## 2024-08-11 RX ORDER — IPRATROPIUM BROMIDE AND ALBUTEROL SULFATE 2.5; .5 MG/3ML; MG/3ML
3 SOLUTION RESPIRATORY (INHALATION) EVERY 6 HOURS PRN
Qty: 75 ML | Refills: 0 | Status: SHIPPED | OUTPATIENT
Start: 2024-08-11 | End: 2025-08-11

## 2024-08-11 NOTE — PROGRESS NOTES
"Subjective:      Patient ID: Lavern Hodges is a 59 y.o. female.    Vitals:  height is 5' 4" (1.626 m) and weight is 74.4 kg (164 lb). Her tympanic temperature is 98.2 °F (36.8 °C). Her blood pressure is 135/81 and her pulse is 94. Her respiration is 15 and oxygen saturation is 98%.     Chief Complaint: Sinus Problem    Patient presents to urgent care with cough. Patient was seen here on 07/27/24 and tested positive for COVID-19. Patient was then seen again on 08/02/2024 - negative CXR and was given prednisone, but has not taken it. She was worried about the strength of the medication. Patient reports that she has used OTC meds and inhaler RX with mild relief.    Sinus Problem  This is a new problem. The current episode started 1 to 4 weeks ago. The problem is unchanged. There has been no fever. Her pain is at a severity of 3/10. The pain is mild. Associated symptoms include congestion, coughing and sinus pressure. Pertinent negatives include no chills, diaphoresis, ear pain, headaches, hoarse voice, neck pain, shortness of breath, sneezing, sore throat or swollen glands. Past treatments include oral decongestants (Flonase, Albuteral Inhaler). The treatment provided mild relief.       Constitution: Negative for chills, sweating, fatigue and fever.   HENT:  Positive for congestion, postnasal drip, sinus pain and sinus pressure. Negative for ear pain, drooling, nosebleeds, foreign body in nose, sore throat, trouble swallowing and voice change.    Neck: Negative for neck pain, neck stiffness, painful lymph nodes and neck swelling.   Cardiovascular:  Negative for chest pain, leg swelling, palpitations, sob on exertion and passing out.   Eyes:  Negative for eye pain, eye redness, photophobia, double vision, blurred vision and eyelid swelling.   Respiratory:  Positive for cough and sputum production. Negative for chest tightness, bloody sputum, shortness of breath, stridor and wheezing.    Gastrointestinal:  Negative for " abdominal pain, abdominal bloating, nausea, vomiting, constipation, diarrhea and heartburn.   Musculoskeletal:  Negative for joint pain, joint swelling, abnormal ROM of joint, back pain, muscle cramps and muscle ache.   Skin:  Negative for rash and hives.   Allergic/Immunologic: Negative for seasonal allergies, food allergies, hives, itching and sneezing.   Neurological:  Negative for dizziness, light-headedness, passing out, facial drooping, speech difficulty, loss of balance, headaches, altered mental status, loss of consciousness and seizures.   Hematologic/Lymphatic: Negative for swollen lymph nodes.   Psychiatric/Behavioral:  Negative for altered mental status and nervous/anxious. The patient is not nervous/anxious.       Objective:     Physical Exam   Constitutional: She is oriented to person, place, and time. She appears well-developed. She is cooperative.  Non-toxic appearance. She does not appear ill. No distress.   HENT:   Head: Normocephalic and atraumatic.   Ears:   Right Ear: Hearing, tympanic membrane, external ear and ear canal normal.   Left Ear: Hearing, tympanic membrane, external ear and ear canal normal.   Nose: Mucosal edema and rhinorrhea present. No nasal deformity. No epistaxis. Right sinus exhibits no maxillary sinus tenderness and no frontal sinus tenderness. Left sinus exhibits no maxillary sinus tenderness and no frontal sinus tenderness.   Mouth/Throat: Uvula is midline and mucous membranes are normal. No trismus in the jaw. Normal dentition. No uvula swelling. Posterior oropharyngeal erythema and cobblestoning present. No oropharyngeal exudate, posterior oropharyngeal edema or tonsillar abscesses. No tonsillar exudate.   Eyes: Conjunctivae and lids are normal. No scleral icterus.   Neck: Trachea normal and phonation normal. Neck supple. No edema present. No erythema present. No neck rigidity present.   Cardiovascular: Normal rate, regular rhythm, normal heart sounds and normal pulses.    Pulmonary/Chest: Effort normal and breath sounds normal. No accessory muscle usage or stridor. No respiratory distress. She has no decreased breath sounds. She has no wheezes. She has no rhonchi. She has no rales.   Abdominal: Normal appearance.   Musculoskeletal: Normal range of motion.         General: No deformity or edema. Normal range of motion.   Lymphadenopathy:     She has no cervical adenopathy.   Neurological: She is alert and oriented to person, place, and time. She exhibits normal muscle tone. Coordination normal.   Skin: Skin is warm, dry, intact, not diaphoretic, not pale and no rash. Capillary refill takes less than 2 seconds.   Psychiatric: Her speech is normal and behavior is normal. Judgment and thought content normal.   Nursing note and vitals reviewed.    Results for orders placed or performed in visit on 08/02/24   POCT Strep A, Molecular   Result Value Ref Range    Molecular Strep A, POC Negative Negative     Acceptable Yes      Assessment:     1. Cough, unspecified type    2. Sinus congestion    3. Postnasal drip    4. Sore throat      Plan:   Discussed viral versus bacterial versus allergy with patient. Will hold off on steroids for now and try nebulizer treatments and inhaler along with other OTC meds at home. Will call and check on patient this week and then treat accordingly. Advised close follow-up with PCP and/or Specialist for further evaluation as needed. ER precautions given to patient as well. Patient aware, verbalized understanding and agreed with plan of care.    Cough, unspecified type    Sinus congestion    Postnasal drip    Sore throat    Other orders  -     nebulizer and compressor Ligia; Nebulizer machine and kit. Use 4 times daily as needed for wheezing.  Dispense: 1 each; Refill: 0  -     albuterol-ipratropium (DUO-NEB) 2.5 mg-0.5 mg/3 mL nebulizer solution; Take 3 mLs by nebulization every 6 (six) hours as needed for Wheezing. Rescue  Dispense: 75 mL;  Refill: 0      There are no Patient Instructions on file for this visit.

## 2024-08-14 ENCOUNTER — OFFICE VISIT (OUTPATIENT)
Dept: FAMILY MEDICINE | Facility: CLINIC | Age: 59
End: 2024-08-14
Payer: COMMERCIAL

## 2024-08-14 VITALS
DIASTOLIC BLOOD PRESSURE: 72 MMHG | HEIGHT: 64 IN | WEIGHT: 168.88 LBS | TEMPERATURE: 98 F | BODY MASS INDEX: 28.83 KG/M2 | HEART RATE: 83 BPM | OXYGEN SATURATION: 98 % | SYSTOLIC BLOOD PRESSURE: 100 MMHG

## 2024-08-14 DIAGNOSIS — J01.00 ACUTE MAXILLARY SINUSITIS, RECURRENCE NOT SPECIFIED: ICD-10-CM

## 2024-08-14 DIAGNOSIS — R09.81 NASAL CONGESTION: Primary | ICD-10-CM

## 2024-08-14 DIAGNOSIS — K21.9 GASTROESOPHAGEAL REFLUX DISEASE, UNSPECIFIED WHETHER ESOPHAGITIS PRESENT: ICD-10-CM

## 2024-08-14 PROCEDURE — 3078F DIAST BP <80 MM HG: CPT | Mod: CPTII,S$GLB,, | Performed by: NURSE PRACTITIONER

## 2024-08-14 PROCEDURE — 1160F RVW MEDS BY RX/DR IN RCRD: CPT | Mod: CPTII,S$GLB,, | Performed by: NURSE PRACTITIONER

## 2024-08-14 PROCEDURE — 99213 OFFICE O/P EST LOW 20 MIN: CPT | Mod: S$GLB,,, | Performed by: NURSE PRACTITIONER

## 2024-08-14 PROCEDURE — 3008F BODY MASS INDEX DOCD: CPT | Mod: CPTII,S$GLB,, | Performed by: NURSE PRACTITIONER

## 2024-08-14 PROCEDURE — 3074F SYST BP LT 130 MM HG: CPT | Mod: CPTII,S$GLB,, | Performed by: NURSE PRACTITIONER

## 2024-08-14 PROCEDURE — 1159F MED LIST DOCD IN RCRD: CPT | Mod: CPTII,S$GLB,, | Performed by: NURSE PRACTITIONER

## 2024-08-14 RX ORDER — AZITHROMYCIN 250 MG/1
TABLET, FILM COATED ORAL
Qty: 6 TABLET | Refills: 0 | Status: SHIPPED | OUTPATIENT
Start: 2024-08-14 | End: 2024-08-19

## 2024-08-14 RX ORDER — PIMECROLIMUS 10 MG/G
CREAM TOPICAL 2 TIMES DAILY
COMMUNITY
Start: 2024-08-07

## 2024-08-14 NOTE — PROGRESS NOTES
Subjective:       Patient ID: Lavern Hodges is a 59 y.o. female.    Chief Complaint: Nasal Congestion    HPI here with concerns for nasal congestion. She was seen at Urgent care on 7/27/24,  8/2/24 and 8/11/24.  She was positive for Covid on 7/24. On 08/02/2024 - negative CXR and was given prednisone, but did not take it. She was worried about the strength of the medication. At the recent visit on 8/11/24 she was given nebulizer solution, inhaler.     Having PND. States just started the Astelin this morning. Cough is dry. Has had a few episodes of mucous that is green and bloody. No fever. Chest feels tight. She is not taking a daily decongestant or antihistamine. See ROS    The following portion of the patients history was reviewed and updated as appropriate: allergies, current medications, past medical and surgical history. Past social history and problem list reviewed. Family PMH and Past social history reviewed. Tobacco, Illicit drug use reviewed.      Review of patient's allergies indicates:   Allergen Reactions    Cefaclor Hives    Codeine Hives         Current Outpatient Medications:     albuterol (PROVENTIL HFA) 90 mcg/actuation inhaler, Inhale 2 puffs into the lungs every 6 (six) hours as needed for Wheezing. Rescue, Disp: 18 g, Rfl: 0    albuterol-ipratropium (DUO-NEB) 2.5 mg-0.5 mg/3 mL nebulizer solution, Take 3 mLs by nebulization every 6 (six) hours as needed for Wheezing. Rescue, Disp: 75 mL, Rfl: 0    azelastine (ASTELIN) 137 mcg (0.1 %) nasal spray, 1 spray (137 mcg total) by Nasal route 2 (two) times daily., Disp: 30 mL, Rfl: 0    calcium carbonate (TUMS) 200 mg calcium (500 mg) chewable tablet, Take 1 tablet by mouth daily as needed for Heartburn., Disp: , Rfl:     chlorhexidine (PERIDEX) 0.12 % solution, daily as needed., Disp: , Rfl:     cholecalciferol, vitamin D3, (VITAMIN D3) 25 mcg (1,000 unit) capsule, 2,000 Units., Disp: , Rfl:     clobetasoL (TEMOVATE) 0.05 % cream, Apply topically 2 (two)  times daily., Disp: , Rfl:     dexlansoprazole (DEXILANT) 60 mg capsule, TAKE 1 CAPSULE(60 MG) BY MOUTH EVERY DAY, Disp: 30 capsule, Rfl: 2    dexlansoprazole (DEXILANT) 60 mg capsule, Take 1 capsule (60 mg total) by mouth before breakfast., Disp: 30 capsule, Rfl: 11    dicyclomine (BENTYL) 20 mg tablet, , Disp: , Rfl:     famotidine (PEPCID) 40 MG tablet, TAKE 1 TABLET(40 MG) BY MOUTH EVERY NIGHT AS NEEDED FOR HEARTBURN, Disp: 90 tablet, Rfl: 1    ketoconazole (NIZORAL) 2 % cream, Apply topically once daily., Disp: 30 g, Rfl: 1    nebulizer and compressor Ligia, Nebulizer machine and kit. Use 4 times daily as needed for wheezing., Disp: 1 each, Rfl: 0    pimecrolimus (ELIDEL) 1 % cream, Apply topically 2 (two) times daily., Disp: , Rfl:     sucralfate (CARAFATE) 1 gram tablet, TAKE 1 TABLET(1 GRAM) BY MOUTH TWICE DAILY AS NEEDED FOR STOMACH ACID OR REFLUX, Disp: 180 tablet, Rfl: 1    albuterol (VENTOLIN HFA) 90 mcg/actuation inhaler, Inhale 2 puffs into the lungs every 6 (six) hours as needed for Wheezing or Shortness of Breath. Rescue (Patient not taking: Reported on 2024), Disp: 18 g, Rfl: 0    Past Medical History:   Diagnosis Date    Allergy     Anxiety     Asthma     due to allergies     GERD (gastroesophageal reflux disease)     Hepatic steatosis     History of nephrolithiasis     Irritable bowel syndrome     Kidney stone     lithotripsy 20 years ago    Peptic ulcer disease     PONV (postoperative nausea and vomiting)     Colin syndrome     Vitamin D deficiency        Past Surgical History:   Procedure Laterality Date     SECTION      times 2    COLONOSCOPY N/A 2016    Procedure: COLONOSCOPY;  Surgeon: Sherman Arevalo MD;  Location: [facility];  Service: Endoscopy;  Laterality: N/A; repeat in 10 years. clear.  internal/external hemorrhoids only.    ESOPHAGEAL MANOMETRY WITH MEASUREMENT OF IMPEDANCE N/A 2023    Procedure: MANOMETRY, ESOPHAGUS, WITH IMPEDANCE MEASUREMENT;  Surgeon:  Sol Ibrahim MD;  Location: [facility] (4TH FLR);  Service: Endoscopy;  Laterality: N/A;  instr portal-alex alvero-tb  11/10-lvm for precall-MS  -precall complete-KPvt    ESOPHAGOGASTRODUODENOSCOPY  2016    Dr. Arevalo: Medium-sized hiatus hernia. a few gastric polyps biopsied; biopsy: Fundic gland polyps, no dysplasia    ESOPHAGOGASTRODUODENOSCOPY N/A 2021    Procedure: EGD (ESOPHAGOGASTRODUODENOSCOPY);  Surgeon: Sherman Arevalo MD;  Location: [facility];  Service: Endoscopy;  Laterality: N/A;    HYSTEROSCOPY      TUBAL LIGATION      UPPER GASTROINTESTINAL ENDOSCOPY  10/29/2010    Dr. Arevalo: mil schatzki ring- dilated, gastric polyp removed, otherwise normal findings. repeat PRN    WISDOM TOOTH EXTRACTION         Social History     Socioeconomic History    Marital status:     Number of children: 2   Occupational History     Employer: [employer]   Tobacco Use    Smoking status: Former     Current packs/day: 0.00     Types: Cigarettes     Quit date: 8/3/1994     Years since quittin.0     Passive exposure: Past    Smokeless tobacco: Never   Substance and Sexual Activity    Alcohol use: No    Drug use: No    Sexual activity: Yes     Partners: Male     Birth control/protection: None, See Surgical Hx     Review of Systems   Constitutional:  Negative for fatigue and fever.   HENT:  Positive for congestion, rhinorrhea and sinus pressure.    Eyes:  Negative for visual disturbance.   Respiratory:  Positive for cough and chest tightness. Negative for shortness of breath and wheezing.    Cardiovascular:  Negative for chest pain, palpitations and leg swelling.   Gastrointestinal:  Negative for abdominal pain, blood in stool, diarrhea, nausea and vomiting.   Genitourinary: Negative.    Musculoskeletal:  Negative for arthralgias, back pain and gait problem.   Skin: Negative.    Neurological:  Negative for headaches.       Objective:      /72 (BP Location: Left arm, Patient  "Position: Sitting, BP Method: Medium (Manual))   Pulse 83   Temp 97.9 °F (36.6 °C)   Ht 5' 4" (1.626 m)   Wt 76.6 kg (168 lb 14 oz)   LMP 03/16/2021 (Approximate)   SpO2 98%   BMI 28.99 kg/m²      Physical Exam  Constitutional:       Appearance: Normal appearance. She is overweight.   HENT:      Head: Normocephalic.      Right Ear: Tympanic membrane, ear canal and external ear normal.      Left Ear: Tympanic membrane, ear canal and external ear normal.      Nose: Congestion and rhinorrhea present.      Right Sinus: Maxillary sinus tenderness present.      Left Sinus: Maxillary sinus tenderness present.      Mouth/Throat:      Mouth: Mucous membranes are moist.      Pharynx: Posterior oropharyngeal erythema present.   Eyes:      Pupils: Pupils are equal, round, and reactive to light.   Neck:      Thyroid: No thyromegaly.      Vascular: No carotid bruit.   Cardiovascular:      Rate and Rhythm: Normal rate and regular rhythm.      Pulses: Normal pulses.      Heart sounds: Normal heart sounds. No murmur heard.  Pulmonary:      Effort: Pulmonary effort is normal.      Breath sounds: Normal breath sounds. No wheezing.   Musculoskeletal:         General: Normal range of motion.      Cervical back: Normal range of motion.      Right lower leg: No edema.      Left lower leg: No edema.      Comments: Gait normal.  strong, equal   Skin:     General: Skin is warm and dry.      Capillary Refill: Capillary refill takes less than 2 seconds.   Neurological:      General: No focal deficit present.      Mental Status: She is alert.   Psychiatric:         Attention and Perception: Attention and perception normal.         Mood and Affect: Mood and affect normal.         Speech: Speech normal.         Behavior: Behavior normal.         Assessment:       1. Nasal congestion    2. Acute maxillary sinusitis, recurrence not specified        Plan:       Nasal congestion: use nasal spray. Take daily mucinex or zyrtec    Acute " maxillary sinusitis, recurrence not specified: Due to duration and presenting exam will cover with antibiotics. Take as directed. Complete full course of medication.    Other orders  -     azithromycin (Z-ANJEL) 250 MG tablet; Take 2 tablets by mouth on day 1; Take 1 tablet by mouth on days 2-5  Dispense: 6 tablet; Refill: 0       Continue current medication  Take medications only as prescribed  Healthy diet  Adequate rest  Adequate hydration  Avoid allergens  Avoid excessive caffeine     Follow up if not improving

## 2024-08-15 RX ORDER — FAMOTIDINE 40 MG/1
TABLET, FILM COATED ORAL
Qty: 90 TABLET | Refills: 1 | Status: SHIPPED | OUTPATIENT
Start: 2024-08-15

## 2024-08-21 ENCOUNTER — TELEPHONE (OUTPATIENT)
Dept: FAMILY MEDICINE | Facility: CLINIC | Age: 59
End: 2024-08-21
Payer: COMMERCIAL

## 2024-08-21 NOTE — TELEPHONE ENCOUNTER
----- Message from Rush Tineo sent at 8/21/2024  8:47 AM CDT -----  Regarding: return call  Contact: patient  Type:  Patient Returning Call    Who Called:patient  Who Left Message for Patient:office nurse  Does the patient know what this is regarding?:patient still having sinus issues/ with medication  Would the patient rather a call back or a response via [facility]? Please advise  Best Call Back Number:[phone]  Additional Information:

## 2024-08-21 NOTE — TELEPHONE ENCOUNTER
"Returned call to pt.  States she was seen and is being treated for sinus congestion.  She advised that her symptoms have improved but have not gone away.  States she is still having post nasal drip and cough.  Completed steroid yesterday.  Still using Astelin and albuterol mdi.  Would like to know if you have any further recommendations. Pt wonders if she should have a different nasal spray for "inflammation".  Pt also states she is still taking robitussin.  States she took prednisone 20 mg for 4 days then 10 mg for two days then stopped.  Does have some prednisone left, should she continue it.   "

## 2024-08-21 NOTE — TELEPHONE ENCOUNTER
She can take a few more days of the prednisone. Can try flonase nasal spray, can buy that at the pharmacy. She might be drying herself out too much.  Sometimes it can take weeks for symptoms to completely resolve and if she has underlying allergies then she might not get Totally clear sinus's.  We can schedule her with ENT if she wants.

## 2024-08-22 NOTE — TELEPHONE ENCOUNTER
"Spoke to pt and read her Gerda's message of:  "She can take a few more days of the prednisone. Can try flonase nasal spray, can buy that at the pharmacy. She might be drying herself out too much. Sometimes it can take weeks for symptoms to completely resolve and if she has underlying allergies then she might not get Totally clear sinus's. We can schedule her with ENT if she wants."      Pt verbalizes understanding and said she will call back if she decides to go to ENT.  "

## 2024-11-19 ENCOUNTER — TELEPHONE (OUTPATIENT)
Dept: FAMILY MEDICINE | Facility: CLINIC | Age: 59
End: 2024-11-19

## 2024-11-19 ENCOUNTER — OFFICE VISIT (OUTPATIENT)
Dept: FAMILY MEDICINE | Facility: CLINIC | Age: 59
End: 2024-11-19
Payer: COMMERCIAL

## 2024-11-19 VITALS
HEART RATE: 93 BPM | BODY MASS INDEX: 29.24 KG/M2 | HEIGHT: 64 IN | WEIGHT: 171.31 LBS | TEMPERATURE: 99 F | OXYGEN SATURATION: 97 % | SYSTOLIC BLOOD PRESSURE: 120 MMHG | DIASTOLIC BLOOD PRESSURE: 70 MMHG

## 2024-11-19 DIAGNOSIS — K21.00 GASTROESOPHAGEAL REFLUX DISEASE WITH ESOPHAGITIS WITHOUT HEMORRHAGE: ICD-10-CM

## 2024-11-19 DIAGNOSIS — R05.1 ACUTE COUGH: ICD-10-CM

## 2024-11-19 DIAGNOSIS — J20.9 BRONCHITIS WITH BRONCHOSPASM: Primary | ICD-10-CM

## 2024-11-19 DIAGNOSIS — Z00.00 LABORATORY EXAM ORDERED AS PART OF ROUTINE GENERAL MEDICAL EXAMINATION: ICD-10-CM

## 2024-11-19 PROCEDURE — 3074F SYST BP LT 130 MM HG: CPT | Mod: CPTII,S$GLB,, | Performed by: NURSE PRACTITIONER

## 2024-11-19 PROCEDURE — 99214 OFFICE O/P EST MOD 30 MIN: CPT | Mod: S$GLB,,, | Performed by: NURSE PRACTITIONER

## 2024-11-19 PROCEDURE — 1160F RVW MEDS BY RX/DR IN RCRD: CPT | Mod: CPTII,S$GLB,, | Performed by: NURSE PRACTITIONER

## 2024-11-19 PROCEDURE — 3008F BODY MASS INDEX DOCD: CPT | Mod: CPTII,S$GLB,, | Performed by: NURSE PRACTITIONER

## 2024-11-19 PROCEDURE — 1159F MED LIST DOCD IN RCRD: CPT | Mod: CPTII,S$GLB,, | Performed by: NURSE PRACTITIONER

## 2024-11-19 PROCEDURE — 3078F DIAST BP <80 MM HG: CPT | Mod: CPTII,S$GLB,, | Performed by: NURSE PRACTITIONER

## 2024-11-19 RX ORDER — AZITHROMYCIN 250 MG/1
TABLET, FILM COATED ORAL
Qty: 6 TABLET | Refills: 0 | Status: SHIPPED | OUTPATIENT
Start: 2024-11-19 | End: 2024-11-24

## 2024-11-19 RX ORDER — ALBUTEROL SULFATE 90 UG/1
2 INHALANT RESPIRATORY (INHALATION) EVERY 6 HOURS PRN
Qty: 18 G | Refills: 2 | Status: SHIPPED | OUTPATIENT
Start: 2024-11-19 | End: 2025-11-19

## 2024-11-19 NOTE — PROGRESS NOTES
Subjective:       Patient ID: Lavern Hodges is a 59 y.o. female.    Chief Complaint: Gastroesophageal Reflux    Gastroesophageal Reflux  She complains of coughing. She reports no abdominal pain, no chest pain, no nausea, no sore throat or no wheezing. Pertinent negatives include no fatigue.     States on Friday she woke up during the night with bad acid reflux. States since that time she has been coughing. Now with chest congestion, PND, some mild wheezing. Denies fever.     She has had trouble keeping her acid reflux under control. She is followed by GI clinic. She is taking her acid reflux medication faithfully. She had manometry: normal results.     No other concerns. See ROS    The following portion of the patients history was reviewed and updated as appropriate: allergies, current medications, past medical and surgical history. Past social history and problem list reviewed. Family PMH and Past social history reviewed. Tobacco, Illicit drug use reviewed.      Review of patient's allergies indicates:   Allergen Reactions    Cefaclor Hives    Codeine Hives         Current Outpatient Medications:     albuterol (PROVENTIL HFA) 90 mcg/actuation inhaler, Inhale 2 puffs into the lungs every 6 (six) hours as needed for Wheezing. Rescue, Disp: 18 g, Rfl: 0    albuterol-ipratropium (DUO-NEB) 2.5 mg-0.5 mg/3 mL nebulizer solution, Take 3 mLs by nebulization every 6 (six) hours as needed for Wheezing. Rescue, Disp: 75 mL, Rfl: 0    azelastine (ASTELIN) 137 mcg (0.1 %) nasal spray, 1 spray (137 mcg total) by Nasal route 2 (two) times daily., Disp: 30 mL, Rfl: 0    calcium carbonate (TUMS) 200 mg calcium (500 mg) chewable tablet, Take 1 tablet by mouth daily as needed for Heartburn., Disp: , Rfl:     chlorhexidine (PERIDEX) 0.12 % solution, daily as needed., Disp: , Rfl:     cholecalciferol, vitamin D3, (VITAMIN D3) 25 mcg (1,000 unit) capsule, 2,000 Units., Disp: , Rfl:     dexlansoprazole (DEXILANT) 60 mg capsule, TAKE 1  CAPSULE(60 MG) BY MOUTH EVERY DAY, Disp: 30 capsule, Rfl: 2    dicyclomine (BENTYL) 20 mg tablet, , Disp: , Rfl:     famotidine (PEPCID) 40 MG tablet, TAKE 1 TABLET(40 MG) BY MOUTH EVERY NIGHT AS NEEDED FOR HEARTBURN, Disp: 90 tablet, Rfl: 1    nebulizer and compressor Ligia, Nebulizer machine and kit. Use 4 times daily as needed for wheezing., Disp: 1 each, Rfl: 0    pimecrolimus (ELIDEL) 1 % cream, Apply topically 2 (two) times daily., Disp: , Rfl:     Past Medical History:   Diagnosis Date    Allergy     Anxiety     Asthma     due to allergies     GERD (gastroesophageal reflux disease)     Hepatic steatosis     History of nephrolithiasis     Irritable bowel syndrome     Kidney stone     lithotripsy 20 years ago    Peptic ulcer disease     PONV (postoperative nausea and vomiting)     Colin syndrome     Vitamin D deficiency        Past Surgical History:   Procedure Laterality Date     SECTION      times 2    COLONOSCOPY N/A 2016    Procedure: COLONOSCOPY;  Surgeon: Sherman Arevalo MD;  Location: [facility];  Service: Endoscopy;  Laterality: N/A; repeat in 10 years. clear.  internal/external hemorrhoids only.    COLONOSCOPY N/A 10/3/2024    Procedure: COLONOSCOPY;  Surgeon: Sherman Arevalo MD;  Location: [facility];  Service: Gastroenterology;  Laterality: N/A;    ESOPHAGEAL MANOMETRY WITH MEASUREMENT OF IMPEDANCE N/A 2023    Procedure: MANOMETRY, ESOPHAGUS, WITH IMPEDANCE MEASUREMENT;  Surgeon: Sol Ibrahim MD;  Location: [address]);  Service: Endoscopy;  Laterality: N/A;  instr senthilalex freeman-tb  11/10-lvm for precall-MS  -precall complete-KPvt    ESOPHAGOGASTRODUODENOSCOPY  2016    Dr. Arevalo: Medium-sized hiatus hernia. a few gastric polyps biopsied; biopsy: Fundic gland polyps, no dysplasia    ESOPHAGOGASTRODUODENOSCOPY N/A 2021    Procedure: EGD (ESOPHAGOGASTRODUODENOSCOPY);  Surgeon: Sherman Arevalo MD;  Location: [facility];  Service:  "Endoscopy;  Laterality: N/A;    HYSTEROSCOPY      TUBAL LIGATION      UPPER GASTROINTESTINAL ENDOSCOPY  10/29/2010    Dr. Arevalo: mil schatzki ring- dilated, gastric polyp removed, otherwise normal findings. repeat PRN    WISDOM TOOTH EXTRACTION         Social History     Socioeconomic History    Marital status:     Number of children: 2   Occupational History     Employer: [employer]   Tobacco Use    Smoking status: Former     Current packs/day: 0.00     Types: Cigarettes     Quit date: 8/3/1994     Years since quittin.3     Passive exposure: Past    Smokeless tobacco: Never   Substance and Sexual Activity    Alcohol use: No    Drug use: No    Sexual activity: Yes     Partners: Male     Birth control/protection: None, See Surgical Hx     Review of Systems   Constitutional:  Negative for fatigue and fever.   HENT:  Positive for postnasal drip. Negative for sinus pressure, sore throat and trouble swallowing.         No allergy medications   Eyes:  Negative for visual disturbance.   Respiratory:  Positive for cough. Negative for chest tightness, shortness of breath and wheezing.         Chest congestion.    Cardiovascular:  Negative for chest pain, palpitations and leg swelling.   Gastrointestinal:  Negative for abdominal pain, blood in stool, diarrhea, nausea and vomiting.        Reflux woke her up Friday night during the night. Acid reflux was better with the dexilent.  Cough is productive with clear to yellow mucous   Genitourinary: Negative.    Musculoskeletal:  Negative for arthralgias, back pain and gait problem.   Skin: Negative.    Neurological:  Negative for headaches.   Psychiatric/Behavioral:  Negative for dysphoric mood and sleep disturbance. The patient is not nervous/anxious.        Objective:      /70 (BP Location: Left arm, Patient Position: Sitting)   Pulse 93   Temp 98.6 °F (37 °C) (Temporal)   Ht 5' 4" (1.626 m)   Wt 77.7 kg (171 lb 4.8 oz)   LMP 2021 (Approximate) "   SpO2 97%   BMI 29.40 kg/m²      Physical Exam  Constitutional:       Appearance: Normal appearance. She is overweight.   HENT:      Head: Normocephalic.      Right Ear: Tympanic membrane, ear canal and external ear normal.      Left Ear: Tympanic membrane, ear canal and external ear normal.      Nose: Congestion present. No rhinorrhea.      Mouth/Throat:      Mouth: Mucous membranes are moist.      Pharynx: Posterior oropharyngeal erythema present.   Eyes:      Pupils: Pupils are equal, round, and reactive to light.   Cardiovascular:      Rate and Rhythm: Normal rate and regular rhythm.      Pulses: Normal pulses.      Heart sounds: Normal heart sounds. No murmur heard.  Pulmonary:      Effort: Pulmonary effort is normal.      Breath sounds: Wheezing (mild end exp wheeze) present.   Abdominal:      General: Bowel sounds are normal.      Tenderness: There is no abdominal tenderness.   Musculoskeletal:         General: Normal range of motion.      Cervical back: Normal range of motion.      Comments: Gait normal.  strong, equal   Skin:     General: Skin is warm and dry.      Capillary Refill: Capillary refill takes less than 2 seconds.   Neurological:      General: No focal deficit present.      Mental Status: She is alert.   Psychiatric:         Attention and Perception: Attention and perception normal.         Mood and Affect: Mood and affect normal.         Speech: Speech normal.         Behavior: Behavior normal.         Assessment:       1. Bronchitis with bronchospasm    2. Acute cough    3. Laboratory exam ordered as part of routine general medical examination    4. Gastroesophageal reflux disease with esophagitis without hemorrhage        Plan:       Bronchitis with bronchospasm: use inhaler as prescribed. Will cover with antibiotics for any bacterial component. Concerns for aspiration.  -     albuterol (PROVENTIL HFA) 90 mcg/actuation inhaler; Inhale 2 puffs into the lungs every 6 (six) hours as  needed for Wheezing. Rescue  Dispense: 18 g; Refill: 2    Acute cough: OTC cough medication    Laboratory exam ordered as part of routine general medical examination  -     TSH; Future; Expected date: 11/19/2024  -     Hemoglobin A1C; Future; Expected date: 11/19/2024  -     Lipid Panel; Future; Expected date: 11/19/2024  -     Comprehensive Metabolic Panel; Future; Expected date: 11/19/2024  -     CBC Auto Differential; Future; Expected date: 11/19/2024    Gastroesophageal reflux disease with esophagitis without hemorrhage: continue to follow with GI clinic. Take medication as prescribed. Avoid acidic foods. Do not eat within a few hours of laying down    Other orders  -     azithromycin (Z-NAJEL) 250 MG tablet; Take 2 tablets by mouth on day 1; Take 1 tablet by mouth on days 2-5  Dispense: 6 tablet; Refill: 0       Continue current medication  Take medications only as prescribed  Healthy diet, exercise  Adequate rest  Adequate hydration  Avoid allergens  Avoid excessive caffeine     Follow up if not improving

## 2024-11-22 ENCOUNTER — PATIENT MESSAGE (OUTPATIENT)
Dept: PODIATRY | Facility: CLINIC | Age: 59
End: 2024-11-22
Payer: COMMERCIAL

## 2024-11-27 ENCOUNTER — IMMUNIZATION (OUTPATIENT)
Dept: FAMILY MEDICINE | Facility: CLINIC | Age: 59
End: 2024-11-27
Payer: COMMERCIAL

## 2024-11-27 DIAGNOSIS — Z23 NEED FOR VACCINATION: Primary | ICD-10-CM

## 2024-12-13 ENCOUNTER — OFFICE VISIT (OUTPATIENT)
Dept: GASTROENTEROLOGY | Facility: CLINIC | Age: 59
End: 2024-12-13
Payer: COMMERCIAL

## 2024-12-13 VITALS — HEIGHT: 64 IN | WEIGHT: 169.56 LBS | BODY MASS INDEX: 28.95 KG/M2

## 2024-12-13 DIAGNOSIS — R14.0 BLOATING SYMPTOM: ICD-10-CM

## 2024-12-13 DIAGNOSIS — R12 HEARTBURN: Primary | ICD-10-CM

## 2024-12-13 DIAGNOSIS — K44.9 HIATAL HERNIA: ICD-10-CM

## 2024-12-13 DIAGNOSIS — Z87.19 HISTORY OF IBS: ICD-10-CM

## 2024-12-13 PROCEDURE — 1159F MED LIST DOCD IN RCRD: CPT | Mod: CPTII,S$GLB,, | Performed by: NURSE PRACTITIONER

## 2024-12-13 PROCEDURE — 3008F BODY MASS INDEX DOCD: CPT | Mod: CPTII,S$GLB,, | Performed by: NURSE PRACTITIONER

## 2024-12-13 PROCEDURE — 99999 PR PBB SHADOW E&M-EST. PATIENT-LVL III: CPT | Mod: PBBFAC,,, | Performed by: NURSE PRACTITIONER

## 2024-12-13 PROCEDURE — 99214 OFFICE O/P EST MOD 30 MIN: CPT | Mod: S$GLB,,, | Performed by: NURSE PRACTITIONER

## 2024-12-13 RX ORDER — FAMOTIDINE 40 MG/1
40 TABLET, FILM COATED ORAL NIGHTLY
Qty: 90 TABLET | Refills: 1 | Status: SHIPPED | OUTPATIENT
Start: 2024-12-13

## 2024-12-13 RX ORDER — VONOPRAZAN FUMARATE 26.72 MG/1
20 TABLET ORAL DAILY
Qty: 30 TABLET | Refills: 1 | Status: SHIPPED | OUTPATIENT
Start: 2024-12-13

## 2024-12-13 RX ORDER — DICYCLOMINE HYDROCHLORIDE 10 MG/1
10 CAPSULE ORAL
Qty: 120 CAPSULE | Refills: 0 | Status: SHIPPED | OUTPATIENT
Start: 2024-12-13 | End: 2025-12-13

## 2024-12-13 NOTE — PROGRESS NOTES
"Subjective:       Patient ID: Lavern Hodges is a 59 y.o. female Body mass index is 29.1 kg/m².    Chief Complaint: Gastroesophageal Reflux    This patient is established with RICHY Hahn NP, Dr. Ibrahim, & myself (last in 2016).    Reports Had episode of bronchitis in 11/2024, saw PCP for it and reports she had an episode of reflux that woke up from sleeping which may have contributed to bronchitis. Also reports she had taken advil at the time of flare-up. Denies NSAID use since then.    PRIOR HISTORY: Patient reports increased stress at home with son and ill pet. Saw PCP for UTI symptoms and reports her "kidney function was down" and had CT scan ordered. CT scan showed fatty infiltration in the colon. Patient was referred to us to further evaluate CT scan finding.  Patient reports she saw Dr. Soto for possible hiatal hernia repair. Patient reports he told her she needed to complete manometry testing and then follow-up and she would possibly need to see a different surgeon that specialized in robotic surgical repair of hiatal hernias. Patient reports she has not been back since manometry testing was completed.    Gastroesophageal Reflux  She complains of belching (sometimes) and heartburn (occurs a couple times a week). She reports no abdominal pain, no chest pain, no choking, no coughing, no dysphagia, no globus sensation, no hoarse voice, no nausea or no sore throat. This is a chronic (several years ago) problem. The problem occurs frequently. The heartburn wakes her from sleep. The heartburn changes with position. The symptoms are aggravated by caffeine and lying down (acidic foods, carbonated drinks, soda, late night eating). Associated symptoms include weight loss (change in diet, trying to lose weight). Pertinent negatives include no fatigue or melena. Risk factors include NSAIDs, caffeine use, smoking/tobacco exposure and hiatal hernia. She has tried a PPI, a diet change and head elevation " (dexilant 60 mg once daily (less effective lately), pepcid 40 mg nightly; tums prn rarely, changed her diet- avoiding trigger foods; PAST: prilosec, protonix- had more heartburn with it, aciphex- mild relief, nexium, carafate) for the symptoms. Past procedures include an EGD and esophageal manometry.     Review of Systems   Constitutional:  Positive for weight loss (change in diet, trying to lose weight). Negative for appetite change, chills, fatigue and fever.   HENT:  Negative for hoarse voice, sore throat and trouble swallowing.    Respiratory:  Negative for cough, choking, chest tightness and shortness of breath.    Cardiovascular:  Negative for chest pain.   Gastrointestinal:  Positive for heartburn (occurs a couple times a week). Negative for abdominal pain, anal bleeding, blood in stool, constipation, diarrhea, dysphagia, melena, nausea, rectal pain and vomiting.        Bowel movements are daily of stool rated 4 on bristol stool scale. Reports history of IBS; reports rare diarrhea, taking bentyl 20 mg PRN- has not taken recently.   Genitourinary:  Negative for difficulty urinating, dysuria, flank pain, frequency, hematuria, pelvic pain and urgency.   Neurological:  Negative for weakness.       Patient's last menstrual period was 2021 (approximate).    Past Medical History:   Diagnosis Date    Allergy     Anxiety     Asthma     due to allergies     GERD (gastroesophageal reflux disease)     Hepatic steatosis     History of nephrolithiasis     Irritable bowel syndrome     Kidney stone     lithotripsy 20 years ago    Peptic ulcer disease     PONV (postoperative nausea and vomiting)     Colin syndrome     Vitamin D deficiency      Past Surgical History:   Procedure Laterality Date     SECTION      times 2    COLONOSCOPY N/A 2016    Procedure: COLONOSCOPY;  Surgeon: Sherman Arevalo MD;  Location: [facility];  Service: Endoscopy;  Laterality: N/A; repeat in 10 years. clear.   internal/external hemorrhoids only.    COLONOSCOPY N/A 10/03/2024    Procedure: COLONOSCOPY;  Surgeon: Sherman Arevalo MD;  Location: [facility];  Service: Gastroenterology;  Laterality: N/A; Repeat colonoscopy in 5 years for surveillance    ESOPHAGEAL MANOMETRY WITH MEASUREMENT OF IMPEDANCE N/A 2023    Procedure: MANOMETRY, ESOPHAGUS, WITH IMPEDANCE MEASUREMENT;  Surgeon: Sol Ibrahim MD;  Location: [facility] ENDO ([facility] FLR);  Service: Endoscopy;  Laterality: N/A;  instr portal-alex alvero-tb  11/10-lvm for precall-MS  -precall complete-KPvt    ESOPHAGOGASTRODUODENOSCOPY  2016    Dr. Arevalo: Medium-sized hiatus hernia. a few gastric polyps biopsied; biopsy: Fundic gland polyps, no dysplasia    ESOPHAGOGASTRODUODENOSCOPY N/A 2021    Procedure: EGD (ESOPHAGOGASTRODUODENOSCOPY);  Surgeon: Sherman Arevalo MD;  Location: [facility] ENDO;  Service: Endoscopy;  Laterality: N/A;    HYSTEROSCOPY      TUBAL LIGATION      UPPER GASTROINTESTINAL ENDOSCOPY  10/29/2010    Dr. Arevalo: mil schatzki ring- dilated, gastric polyp removed, otherwise normal findings. repeat PRN    WISDOM TOOTH EXTRACTION       Family History   Problem Relation Name Age of Onset    Ovarian cancer Maternal Aunt      Colon cancer Maternal Aunt          diagnosed in her 70's    Alzheimer's disease Maternal Grandmother      Crohn's disease Neg Hx      Ulcerative colitis Neg Hx      Esophageal cancer Neg Hx      Stomach cancer Neg Hx       Social History     Tobacco Use    Smoking status: Former     Current packs/day: 0.00     Types: Cigarettes     Quit date: 8/3/1994     Years since quittin.3     Passive exposure: Past    Smokeless tobacco: Never   Substance Use Topics    Alcohol use: No    Drug use: No     Wt Readings from Last 10 Encounters:   24 76.9 kg (169 lb 8.5 oz)   24 77.7 kg (171 lb 4.8 oz)   10/03/24 76 kg (167 lb 8.8 oz)   24 76.6 kg (168 lb 14 oz)   24 74.4 kg (164 lb)   24 74.4  kg (164 lb)   07/27/24 74.4 kg (164 lb)   07/10/24 74.4 kg (164 lb)   05/22/24 74.8 kg (164 lb 14.5 oz)   05/09/24 75 kg (165 lb 3.8 oz)     Lab Results   Component Value Date    WBC 4.02 12/23/2023    HGB 14.8 12/23/2023    HCT 43.7 12/23/2023    MCV 90 12/23/2023     12/23/2023     CMP  Sodium   Date Value Ref Range Status   05/22/2024 140 136 - 145 mmol/L Final     Potassium   Date Value Ref Range Status   05/22/2024 4.2 3.5 - 5.1 mmol/L Final     Chloride   Date Value Ref Range Status   05/22/2024 107 95 - 110 mmol/L Final     CO2   Date Value Ref Range Status   05/22/2024 26 23 - 29 mmol/L Final     Glucose   Date Value Ref Range Status   05/22/2024 90 70 - 110 mg/dL Final     BUN   Date Value Ref Range Status   05/22/2024 11 6 - 20 mg/dL Final     Creatinine   Date Value Ref Range Status   05/22/2024 1.1 0.5 - 1.4 mg/dL Final     Calcium   Date Value Ref Range Status   05/22/2024 9.8 8.7 - 10.5 mg/dL Final     Total Protein   Date Value Ref Range Status   05/22/2024 7.3 6.0 - 8.4 g/dL Final     Albumin   Date Value Ref Range Status   05/22/2024 4.2 3.5 - 5.2 g/dL Final     Total Bilirubin   Date Value Ref Range Status   05/22/2024 0.6 0.1 - 1.0 mg/dL Final     Comment:     For infants and newborns, interpretation of results should be based  on gestational age, weight and in agreement with clinical  observations.    Premature Infant recommended reference ranges:  Up to 24 hours.............<8.0 mg/dL  Up to 48 hours............<12.0 mg/dL  3-5 days..................<15.0 mg/dL  6-29 days.................<15.0 mg/dL       Alkaline Phosphatase   Date Value Ref Range Status   05/22/2024 73 55 - 135 U/L Final     AST   Date Value Ref Range Status   05/22/2024 15 10 - 40 U/L Final     ALT   Date Value Ref Range Status   05/22/2024 14 10 - 44 U/L Final     Anion Gap   Date Value Ref Range Status   05/22/2024 7 (L) 8 - 16 mmol/L Final     eGFR if    Date Value Ref Range Status   12/09/2021 >60.0  >60 mL/min/1.73 m^2 Final     eGFR if non    Date Value Ref Range Status   12/09/2021 >60.0 >60 mL/min/1.73 m^2 Final     Comment:     Calculation used to obtain the estimated glomerular filtration  rate (eGFR) is the CKD-EPI equation.        Lab Results   Component Value Date    TSH 2.100 12/23/2023     Lab Results   Component Value Date    PTGIKQZV59 201 (L) 12/23/2023 8/24/2023 magnesium WNL  5/31/2024 stool calprotectin 270 (H)    Reviewed prior medical records including radiology report of 5/18/2024 and 12/3/2022 CT renal stone abdomen pelvis scans; 9/7/2023 esophagram; 7/10/2019 abdominal ultrasound & CT renal stone abdomen pelvis; 11/17/2023 esophageal manometry WNL; & endoscopy history (see surgical history).    Objective:      Physical Exam  Vitals and nursing note reviewed.   Constitutional:       General: She is not in acute distress.     Appearance: Normal appearance. She is well-developed. She is not diaphoretic.   HENT:      Mouth/Throat:      Lips: Pink. No lesions.      Mouth: Mucous membranes are moist. No oral lesions.      Tongue: No lesions.      Pharynx: Oropharynx is clear. No pharyngeal swelling or posterior oropharyngeal erythema.   Eyes:      General: No scleral icterus.     Conjunctiva/sclera: Conjunctivae normal.   Pulmonary:      Effort: Pulmonary effort is normal. No respiratory distress.      Breath sounds: Normal breath sounds. No wheezing.   Abdominal:      General: Bowel sounds are normal. There is no distension or abdominal bruit.      Palpations: Abdomen is soft. Abdomen is not rigid. There is no mass.      Tenderness: There is no abdominal tenderness. There is no guarding or rebound. Negative signs include Minor's sign and McBurney's sign.   Skin:     General: Skin is warm and dry.      Coloration: Skin is not jaundiced or pale.      Findings: No erythema or rash.   Neurological:      Mental Status: She is alert and oriented to person, place, and time.    Psychiatric:         Behavior: Behavior normal.         Thought Content: Thought content normal.         Judgment: Judgment normal.         Assessment:       1. Heartburn    2. Hiatal hernia    3. History of IBS    4. Bloating symptom        Plan:       Heartburn  - DISCONTINUE DEXILANT DUE TO ALTERNATE THERAPY  -  START   vonoprazan (VOQUEZNA) 20 mg Tab; Take 1 tablet (20 mg) by mouth once daily.  Dispense: 30 tablet; Refill: 1  -  REFILL   famotidine (PEPCID) 40 MG tablet; Take 1 tablet (40 mg total) by mouth every evening.  Dispense: 90 tablet; Refill: 1  - schedule EGD, discussed procedure with patient, including risks and benefits, patient verbalized understanding    Hiatal hernia  -  START   vonoprazan (VOQUEZNA) 20 mg Tab; Take 1 tablet (20 mg) by mouth once daily.  Dispense: 30 tablet; Refill: 1  -  REFILL   famotidine (PEPCID) 40 MG tablet; Take 1 tablet (40 mg total) by mouth every evening.  Dispense: 90 tablet; Refill: 1  - schedule EGD, discussed procedure with patient, including risks and benefits, patient verbalized understanding  - Recommend follow-up with general surgery for continued evaluation and management.    History of IBS  -   REFILL  dicyclomine (BENTYL) 10 MG capsule; Take 1 capsule (10 mg total) by mouth before meals and at bedtime as needed (abdominal cramping/pain).  Dispense: 120 capsule; Refill: 0  - recommend OTC probiotic, such as Florastor or Culturelle, taken as directed on packaging  - avoid lactose, alcohol, & caffeine  - avoid known triggers    Bloating symptom  -  REFILL   dicyclomine (BENTYL) 10 MG capsule; Take 1 capsule (10 mg total) by mouth before meals and at bedtime as needed (abdominal cramping/pain).  Dispense: 120 capsule; Refill: 0  - recommend OTC simethicone as directed, such as Phazyme or Gas-x  - recommend low gas diet: Reduce or eliminate these foods from your diet: Broccoli, Cauliflower, [place] sprouts, Cabbage, Cooked dried beans, Carbonated beverages  (sparkling water, soda, beer, champagne)  Other Causes Of Excess Gas Include:   1) EATING TOO FAST or TALKING WHILE YOU CHEW may cause you to swallow air. This increases the amount of gas in the stomach and may worsen your symptoms.  --> Chew each mouthful completely before swallowing. Take your time.  2) OVEREATING may increase the feeling of being bloated and cause more gas.  --> When you are full, stop eating.  3) CONSTIPATION can increase the amount of normal intestinal gas.  --> Avoid constipation by increasing the amount of fiber in your diet by including whole cereal grains, fresh vegetables (except those in the above list) and fresh fruits. High-fiber foods absorb water and carry it out of the body. When increasing the amount of fiber in your diet, you also need to increase the amount of water that you drink. You should drink at least eight 8-ounce glasses of water (two quarts) per day.    Follow up in about 1 month (around 1/13/2025), or if symptoms worsen or fail to improve.    If no improvement in symptoms or symptoms worsen, call/follow-up at clinic or go to ER.        35 minutes of total time spent on the encounter, which includes face to face time and non-face to face time preparing to see the patient (e.g., review of tests), Obtaining and/or reviewing separately obtained history, Documenting clinical information in the electronic or other health record, Independently interpreting results (not separately reported) and communicating results to the patient/family/caregiver, or Care coordination (not separately reported).

## 2024-12-30 ENCOUNTER — OFFICE VISIT (OUTPATIENT)
Dept: FAMILY MEDICINE | Facility: CLINIC | Age: 59
End: 2024-12-30
Payer: COMMERCIAL

## 2024-12-30 ENCOUNTER — LAB VISIT (OUTPATIENT)
Dept: LAB | Facility: HOSPITAL | Age: 59
End: 2024-12-30
Attending: NURSE PRACTITIONER
Payer: COMMERCIAL

## 2024-12-30 VITALS
HEIGHT: 64 IN | WEIGHT: 169.31 LBS | HEART RATE: 86 BPM | OXYGEN SATURATION: 97 % | DIASTOLIC BLOOD PRESSURE: 68 MMHG | BODY MASS INDEX: 28.91 KG/M2 | SYSTOLIC BLOOD PRESSURE: 114 MMHG | RESPIRATION RATE: 18 BRPM | TEMPERATURE: 98 F

## 2024-12-30 DIAGNOSIS — E53.8 VITAMIN B 12 DEFICIENCY: ICD-10-CM

## 2024-12-30 DIAGNOSIS — F41.9 ANXIETY: ICD-10-CM

## 2024-12-30 DIAGNOSIS — Z00.00 ANNUAL PHYSICAL EXAM: Primary | ICD-10-CM

## 2024-12-30 DIAGNOSIS — K21.00 GASTROESOPHAGEAL REFLUX DISEASE WITH ESOPHAGITIS WITHOUT HEMORRHAGE: ICD-10-CM

## 2024-12-30 DIAGNOSIS — Z00.00 LABORATORY EXAM ORDERED AS PART OF ROUTINE GENERAL MEDICAL EXAMINATION: ICD-10-CM

## 2024-12-30 DIAGNOSIS — K58.2 IRRITABLE BOWEL SYNDROME WITH BOTH CONSTIPATION AND DIARRHEA: ICD-10-CM

## 2024-12-30 DIAGNOSIS — E55.9 VITAMIN D DEFICIENCY: ICD-10-CM

## 2024-12-30 LAB
ALBUMIN SERPL BCP-MCNC: 4.1 G/DL (ref 3.5–5.2)
ALP SERPL-CCNC: 69 U/L (ref 40–150)
ALT SERPL W/O P-5'-P-CCNC: 15 U/L (ref 10–44)
ANION GAP SERPL CALC-SCNC: 10 MMOL/L (ref 8–16)
AST SERPL-CCNC: 16 U/L (ref 10–40)
BASOPHILS # BLD AUTO: 0.01 K/UL (ref 0–0.2)
BASOPHILS NFR BLD: 0.2 % (ref 0–1.9)
BILIRUB SERPL-MCNC: 0.6 MG/DL (ref 0.1–1)
BUN SERPL-MCNC: 15 MG/DL (ref 6–20)
CALCIUM SERPL-MCNC: 9.5 MG/DL (ref 8.7–10.5)
CHLORIDE SERPL-SCNC: 109 MMOL/L (ref 95–110)
CHOLEST SERPL-MCNC: 187 MG/DL (ref 120–199)
CHOLEST/HDLC SERPL: 3.7 {RATIO} (ref 2–5)
CO2 SERPL-SCNC: 24 MMOL/L (ref 23–29)
CREAT SERPL-MCNC: 1 MG/DL (ref 0.5–1.4)
DIFFERENTIAL METHOD BLD: NORMAL
EOSINOPHIL # BLD AUTO: 0.1 K/UL (ref 0–0.5)
EOSINOPHIL NFR BLD: 2.9 % (ref 0–8)
ERYTHROCYTE [DISTWIDTH] IN BLOOD BY AUTOMATED COUNT: 13 % (ref 11.5–14.5)
EST. GFR  (NO RACE VARIABLE): >60 ML/MIN/1.73 M^2
ESTIMATED AVG GLUCOSE: 103 MG/DL (ref 68–131)
GLUCOSE SERPL-MCNC: 94 MG/DL (ref 70–110)
HBA1C MFR BLD: 5.2 % (ref 4–5.6)
HCT VFR BLD AUTO: 44.9 % (ref 37–48.5)
HDLC SERPL-MCNC: 50 MG/DL (ref 40–75)
HDLC SERPL: 26.7 % (ref 20–50)
HGB BLD-MCNC: 14.8 G/DL (ref 12–16)
IMM GRANULOCYTES # BLD AUTO: 0.01 K/UL (ref 0–0.04)
IMM GRANULOCYTES NFR BLD AUTO: 0.2 % (ref 0–0.5)
LDLC SERPL CALC-MCNC: 98.6 MG/DL (ref 63–159)
LYMPHOCYTES # BLD AUTO: 1.5 K/UL (ref 1–4.8)
LYMPHOCYTES NFR BLD: 35.8 % (ref 18–48)
MCH RBC QN AUTO: 30.5 PG (ref 27–31)
MCHC RBC AUTO-ENTMCNC: 33 G/DL (ref 32–36)
MCV RBC AUTO: 93 FL (ref 82–98)
MONOCYTES # BLD AUTO: 0.4 K/UL (ref 0.3–1)
MONOCYTES NFR BLD: 9.8 % (ref 4–15)
NEUTROPHILS # BLD AUTO: 2.1 K/UL (ref 1.8–7.7)
NEUTROPHILS NFR BLD: 51.1 % (ref 38–73)
NONHDLC SERPL-MCNC: 137 MG/DL
NRBC BLD-RTO: 0 /100 WBC
PLATELET # BLD AUTO: 201 K/UL (ref 150–450)
PMV BLD AUTO: 9.7 FL (ref 9.2–12.9)
POTASSIUM SERPL-SCNC: 4.3 MMOL/L (ref 3.5–5.1)
PROT SERPL-MCNC: 7.1 G/DL (ref 6–8.4)
RBC # BLD AUTO: 4.85 M/UL (ref 4–5.4)
SODIUM SERPL-SCNC: 143 MMOL/L (ref 136–145)
TRIGL SERPL-MCNC: 192 MG/DL (ref 30–150)
TSH SERPL DL<=0.005 MIU/L-ACNC: 1.84 UIU/ML (ref 0.4–4)
WBC # BLD AUTO: 4.08 K/UL (ref 3.9–12.7)

## 2024-12-30 PROCEDURE — 84443 ASSAY THYROID STIM HORMONE: CPT | Performed by: NURSE PRACTITIONER

## 2024-12-30 PROCEDURE — 3078F DIAST BP <80 MM HG: CPT | Mod: CPTII,S$GLB,, | Performed by: NURSE PRACTITIONER

## 2024-12-30 PROCEDURE — 85025 COMPLETE CBC W/AUTO DIFF WBC: CPT | Performed by: NURSE PRACTITIONER

## 2024-12-30 PROCEDURE — 3074F SYST BP LT 130 MM HG: CPT | Mod: CPTII,S$GLB,, | Performed by: NURSE PRACTITIONER

## 2024-12-30 PROCEDURE — 83036 HEMOGLOBIN GLYCOSYLATED A1C: CPT | Performed by: NURSE PRACTITIONER

## 2024-12-30 PROCEDURE — 3008F BODY MASS INDEX DOCD: CPT | Mod: CPTII,S$GLB,, | Performed by: NURSE PRACTITIONER

## 2024-12-30 PROCEDURE — 80061 LIPID PANEL: CPT | Performed by: NURSE PRACTITIONER

## 2024-12-30 PROCEDURE — 36415 COLL VENOUS BLD VENIPUNCTURE: CPT | Mod: PN | Performed by: NURSE PRACTITIONER

## 2024-12-30 PROCEDURE — 99396 PREV VISIT EST AGE 40-64: CPT | Mod: S$GLB,,, | Performed by: NURSE PRACTITIONER

## 2024-12-30 PROCEDURE — 1159F MED LIST DOCD IN RCRD: CPT | Mod: CPTII,S$GLB,, | Performed by: NURSE PRACTITIONER

## 2024-12-30 PROCEDURE — 80053 COMPREHEN METABOLIC PANEL: CPT | Performed by: NURSE PRACTITIONER

## 2024-12-30 PROCEDURE — 1160F RVW MEDS BY RX/DR IN RCRD: CPT | Mod: CPTII,S$GLB,, | Performed by: NURSE PRACTITIONER

## 2024-12-30 PROCEDURE — 82043 UR ALBUMIN QUANTITATIVE: CPT | Performed by: NURSE PRACTITIONER

## 2024-12-30 NOTE — PROGRESS NOTES
Venipuncture performed with 23 gauge butterfly, x's 1 attempt,  to right forearm vein.  Unable to collect specimens.     Pressure dressing applied to site, instructed patient to remove dressing in 10-15 minutes, OK to re-adjust dressing if pressure causing any discomfort, to observe closely for numbness and/or discoloration to hand or fingers, and to notify provider if bleeding persists after applying constant pressure lasting 30 minutes.

## 2024-12-30 NOTE — PROGRESS NOTES
Venipuncture performed with 21 gauge butterfly, x's 1 attempt,  to L Antecubital vein.  Unable to collect specimens.      Pressure dressing applied to site, instructed patient to remove dressing in 10-15 minutes, OK to re-adjust dressing if pressure causing any discomfort, to observe closely for numbness and/or discoloration to hand or fingers, and to notify provider if bleeding persists after applying constant pressure lasting 30 minutes.

## 2024-12-30 NOTE — PROGRESS NOTES
Subjective:       Patient ID: Lavern Hodges is a 59 y.o. female.    Chief Complaint: Annual Exam    HPI here for annual exam. Due for urine screening. Wants to have B12 and Vit D checked.     She had rash that was felt to be drug reaction. She has seen Dermatology.  Rash improved after stopping the B12.    She is followed by GI clinic. She has had a hard time getting her GERD under control. She was started on Voquezna and finds that is helping.     Several chronic issues.     See ROS    The following portion of the patients history was reviewed and updated as appropriate: allergies, current medications, past medical and surgical history. Past social history and problem list reviewed. Family PMH and Past social history reviewed. Tobacco, Illicit drug use reviewed.      Review of patient's allergies indicates:   Allergen Reactions    Cefaclor Hives    Codeine Hives         Current Outpatient Medications:     albuterol (PROVENTIL HFA) 90 mcg/actuation inhaler, Inhale 2 puffs into the lungs every 6 (six) hours as needed for Wheezing. Rescue, Disp: 18 g, Rfl: 2    albuterol-ipratropium (DUO-NEB) 2.5 mg-0.5 mg/3 mL nebulizer solution, Take 3 mLs by nebulization every 6 (six) hours as needed for Wheezing. Rescue, Disp: 75 mL, Rfl: 0    calcium carbonate (TUMS) 200 mg calcium (500 mg) chewable tablet, Take 1 tablet by mouth daily as needed for Heartburn., Disp: , Rfl:     chlorhexidine (PERIDEX) 0.12 % solution, daily as needed., Disp: , Rfl:     cholecalciferol, vitamin D3, (VITAMIN D3) 25 mcg (1,000 unit) capsule, 2,000 Units., Disp: , Rfl:     dicyclomine (BENTYL) 10 MG capsule, Take 1 capsule (10 mg total) by mouth before meals and at bedtime as needed (abdominal cramping/pain)., Disp: 120 capsule, Rfl: 0    famotidine (PEPCID) 40 MG tablet, Take 1 tablet (40 mg total) by mouth every evening., Disp: 90 tablet, Rfl: 1    nebulizer and compressor Ligia, Nebulizer machine and kit. Use 4 times daily as needed for wheezing.,  Disp: 1 each, Rfl: 0    vonoprazan (VOQUEZNA) 20 mg Tab, Take 1 tablet (20 mg) by mouth once daily., Disp: 30 tablet, Rfl: 1    Past Medical History:   Diagnosis Date    Allergy     Anxiety     Asthma     due to allergies     GERD (gastroesophageal reflux disease)     Hepatic steatosis     History of nephrolithiasis     Irritable bowel syndrome     Kidney stone     lithotripsy 20 years ago    Peptic ulcer disease     PONV (postoperative nausea and vomiting)     Colin syndrome     Vitamin D deficiency        Past Surgical History:   Procedure Laterality Date     SECTION      times 2    COLONOSCOPY N/A 2016    Procedure: COLONOSCOPY;  Surgeon: Sherman Arevalo MD;  Location: [facility];  Service: Endoscopy;  Laterality: N/A; repeat in 10 years. clear.  internal/external hemorrhoids only.    COLONOSCOPY N/A 10/03/2024    Procedure: COLONOSCOPY;  Surgeon: Sherman Arevalo MD;  Location: [facility];  Service: Gastroenterology;  Laterality: N/A; Repeat colonoscopy in 5 years for surveillance    ESOPHAGEAL MANOMETRY WITH MEASUREMENT OF IMPEDANCE N/A 2023    Procedure: MANOMETRY, ESOPHAGUS, WITH IMPEDANCE MEASUREMENT;  Surgeon: Sol Ibrahim MD;  [facility]  Service: Endoscopy;  Laterality: N/A;  instr [place]-alex alvero-tb  11/10-lvm for precall-MS  -precall complete-KPvt    ESOPHAGOGASTRODUODENOSCOPY  2016    Dr. Arevalo: Medium-sized hiatus hernia. a few gastric polyps biopsied; biopsy: Fundic gland polyps, no dysplasia    ESOPHAGOGASTRODUODENOSCOPY N/A 2021    Procedure: EGD (ESOPHAGOGASTRODUODENOSCOPY);  Surgeon: Sherman Arevalo MD;  Location: [facility];  Service: Endoscopy;  Laterality: N/A;    HYSTEROSCOPY      TUBAL LIGATION      UPPER GASTROINTESTINAL ENDOSCOPY  10/29/2010    Dr. Arevalo: mil cucoki ring- dilated, gastric polyp removed, otherwise normal findings. repeat PRN    WISDOM TOOTH EXTRACTION         Social History  "    Socioeconomic History    Marital status:     Number of children: 2   Occupational History     Employer: [employer]   Tobacco Use    Smoking status: Former     Current packs/day: 0.00     Types: Cigarettes     Quit date: 8/3/1994     Years since quittin.4     Passive exposure: Past    Smokeless tobacco: Never   Substance and Sexual Activity    Alcohol use: No    Drug use: No    Sexual activity: Yes     Partners: Male     Birth control/protection: None, See Surgical Hx     Review of Systems   Constitutional:  Positive for fatigue. Negative for fever.   Eyes:  Negative for visual disturbance.   Respiratory:  Negative for cough, chest tightness, shortness of breath and wheezing.    Cardiovascular:  Negative for chest pain, palpitations and leg swelling.   Gastrointestinal:  Negative for abdominal pain, blood in stool, diarrhea, nausea and vomiting.   Genitourinary: Negative.    Musculoskeletal:  Negative for arthralgias, back pain and gait problem.   Neurological:  Negative for headaches.   Psychiatric/Behavioral:  Negative for dysphoric mood and sleep disturbance. The patient is not nervous/anxious.        Objective:      /68 (BP Location: Left arm, Patient Position: Sitting)   Pulse 86   Temp 98.2 °F (36.8 °C)   Resp 18   Ht 5' 4" (1.626 m)   Wt 76.8 kg (169 lb 5 oz)   LMP 2021 (Approximate)   SpO2 97%   BMI 29.06 kg/m²      Physical Exam  Constitutional:       Appearance: Normal appearance. She is overweight.   HENT:      Head: Normocephalic.      Left Ear: External ear normal.      Nose: No rhinorrhea.      Mouth/Throat:      Tonsils: No tonsillar exudate.   Eyes:      Pupils: Pupils are equal, round, and reactive to light.   Neck:      Thyroid: No thyromegaly.      Vascular: No carotid bruit.   Cardiovascular:      Rate and Rhythm: Normal rate and regular rhythm.      Pulses: Normal pulses.      Heart sounds: Normal heart sounds. No murmur heard.  Pulmonary:      Effort: " Pulmonary effort is normal.      Breath sounds: Normal breath sounds. No decreased breath sounds or wheezing.   Abdominal:      General: Bowel sounds are normal.      Tenderness: There is no abdominal tenderness.   Musculoskeletal:         General: Normal range of motion.      Cervical back: Normal range of motion.      Right lower leg: No edema.      Left lower leg: No edema.      Comments: Gait normal.  strong, equal   Skin:     General: Skin is warm and dry.      Capillary Refill: Capillary refill takes less than 2 seconds.   Neurological:      General: No focal deficit present.      Mental Status: She is alert.   Psychiatric:         Attention and Perception: Attention and perception normal.         Mood and Affect: Mood and affect normal.         Speech: Speech normal.         Behavior: Behavior normal.         Assessment:       1. Annual physical exam    2. Irritable bowel syndrome with both constipation and diarrhea    3. Gastroesophageal reflux disease with esophagitis without hemorrhage    4. Laboratory exam ordered as part of routine general medical examination    5. Vitamin D deficiency    6. Vitamin B 12 deficiency    7. Anxiety        Plan:        Lavern was seen today for annual exam.    Diagnoses and all orders for this visit:    Annual physical exam    Irritable bowel syndrome with both constipation and diarrhea: stable. Followed by GI    Gastroesophageal reflux disease with esophagitis without hemorrhage: stable. Followed by GI    Laboratory exam ordered as part of routine general medical examination  -     Microalbumin/Creatinine Ratio, Urine    Vitamin D deficiency  -     Cancel: Vitamin D  -     Vitamin D; Future    Vitamin B 12 deficiency  -     Cancel: Vitamin B12  -     VITAMIN B12; Future    Anxiety: stable without medication at this time      Continue current medication  Take medications only as prescribed  Healthy diet, exercise  Adequate rest  Adequate hydration  Avoid allergens  Avoid  excessive caffeine     Follow up 6 months.

## 2024-12-31 ENCOUNTER — LAB VISIT (OUTPATIENT)
Dept: LAB | Facility: HOSPITAL | Age: 59
End: 2024-12-31
Attending: NURSE PRACTITIONER
Payer: COMMERCIAL

## 2024-12-31 ENCOUNTER — TELEPHONE (OUTPATIENT)
Dept: FAMILY MEDICINE | Facility: CLINIC | Age: 59
End: 2024-12-31
Payer: COMMERCIAL

## 2024-12-31 DIAGNOSIS — E55.9 VITAMIN D DEFICIENCY: ICD-10-CM

## 2024-12-31 DIAGNOSIS — E53.8 VITAMIN B 12 DEFICIENCY: ICD-10-CM

## 2024-12-31 LAB
25(OH)D3+25(OH)D2 SERPL-MCNC: 25 NG/ML (ref 30–96)
ALBUMIN/CREAT UR: 3.7 UG/MG (ref 0–30)
CREAT UR-MCNC: 163 MG/DL (ref 15–325)
MICROALBUMIN UR DL<=1MG/L-MCNC: 6 UG/ML
VIT B12 SERPL-MCNC: 249 PG/ML (ref 210–950)

## 2024-12-31 PROCEDURE — 82306 VITAMIN D 25 HYDROXY: CPT | Performed by: NURSE PRACTITIONER

## 2024-12-31 PROCEDURE — 82607 VITAMIN B-12: CPT | Performed by: NURSE PRACTITIONER

## 2024-12-31 PROCEDURE — 36415 COLL VENOUS BLD VENIPUNCTURE: CPT | Mod: PO | Performed by: NURSE PRACTITIONER

## 2024-12-31 NOTE — TELEPHONE ENCOUNTER
----- Message from Timbo sent at 12/31/2024  7:50 AM CST -----  Type:  Needs Medical Advice    Who Called: PT    Would the patient rather a call back or a response via [facility]? Call back    Best Call Back Number: [phone]      Additional Information: Sts she wants to know if there is anyway if she can come today to get one of the vaccinations.   Please advise -- Thank you

## 2025-01-03 ENCOUNTER — PATIENT MESSAGE (OUTPATIENT)
Dept: FAMILY MEDICINE | Facility: CLINIC | Age: 60
End: 2025-01-03
Payer: COMMERCIAL

## 2025-01-15 ENCOUNTER — CLINICAL SUPPORT (OUTPATIENT)
Dept: FAMILY MEDICINE | Facility: CLINIC | Age: 60
End: 2025-01-15
Payer: COMMERCIAL

## 2025-01-15 DIAGNOSIS — Z23 NEED FOR PNEUMOCOCCAL 20-VALENT CONJUGATE VACCINATION: Primary | ICD-10-CM

## 2025-01-15 DIAGNOSIS — Z23 IMMUNIZATION DUE: Primary | ICD-10-CM

## 2025-01-15 PROCEDURE — 90471 IMMUNIZATION ADMIN: CPT | Mod: S$GLB,,, | Performed by: NURSE PRACTITIONER

## 2025-01-15 PROCEDURE — 90677 PCV20 VACCINE IM: CPT | Mod: S$GLB,,, | Performed by: NURSE PRACTITIONER

## 2025-01-15 NOTE — PROGRESS NOTES
PCV 20 vaccination given per WOG. Pt tolerated well. No reaction noted. Pt is scheduled to have her Shingrix or Covid vaccination 1/22/25 - will decide which one she will take next at the time of that appointment.

## 2025-02-04 ENCOUNTER — TELEPHONE (OUTPATIENT)
Dept: FAMILY MEDICINE | Facility: CLINIC | Age: 60
End: 2025-02-04

## 2025-02-04 NOTE — TELEPHONE ENCOUNTER
----- Message from Marilee sent at 2/3/2025  2:09 PM CST -----  Type:  Sooner Appointment Request    Caller is requesting a sooner appointment.  Caller declined first available appointment listed below.  Caller will not accept being placed on the waitlist and is requesting a message be sent to doctor.    Name of Caller:  pt   When is the first available appointment?  Needs reschedule   Symptoms:  petroff  Would the patient rather a call back or a response via [facility]? Call   Best Call Back Number:  [phone]      Additional Information:  please advise

## 2025-02-14 DIAGNOSIS — K44.9 HIATAL HERNIA: ICD-10-CM

## 2025-02-14 DIAGNOSIS — R12 HEARTBURN: ICD-10-CM

## 2025-02-14 RX ORDER — VONOPRAZAN FUMARATE 26.72 MG/1
20 TABLET ORAL DAILY
Qty: 30 TABLET | Refills: 1 | Status: CANCELLED | OUTPATIENT
Start: 2025-02-14

## 2025-02-16 RX ORDER — VONOPRAZAN FUMARATE 26.72 MG/1
20 TABLET ORAL DAILY
Qty: 30 TABLET | Refills: 6 | Status: SHIPPED | OUTPATIENT
Start: 2025-02-16

## 2025-02-26 ENCOUNTER — CLINICAL SUPPORT (OUTPATIENT)
Dept: FAMILY MEDICINE | Facility: CLINIC | Age: 60
End: 2025-02-26
Payer: COMMERCIAL

## 2025-02-26 DIAGNOSIS — Z23 IMMUNIZATION DUE: Primary | ICD-10-CM

## 2025-03-14 ENCOUNTER — TELEPHONE (OUTPATIENT)
Dept: FAMILY MEDICINE | Facility: CLINIC | Age: 60
End: 2025-03-14
Payer: COMMERCIAL

## 2025-03-14 DIAGNOSIS — R39.9 UTI SYMPTOMS: Primary | ICD-10-CM

## 2025-03-14 NOTE — TELEPHONE ENCOUNTER
----- Message from Isabela sent at 3/14/2025  1:24 PM CDT -----  Regarding: Needs return call  Type: Needs Medical AdviceWho Called:  Pt Best Call Back Number: [phone] Information: Pt thinks she has a uti,. She wants to see if she should take some over the counter medication or she wanted to see if she can do a urine sample, please advise.

## 2025-03-14 NOTE — TELEPHONE ENCOUNTER
Pt thinks she has UTI as she is having burning and itching.  Offered pt e-visit, which she did not want.  Informed pt that we can put orders in for urine sample at [facility].  She is not sure if she can make it to the lab this afternoon, but asked that we put urine orders in anyway.  Advised her that if she does not go today, we are closed tomorrow and Gerda will not look at the results until Monday.  Pt verbalizes understanding.  Advised pt to go to urgent care if she is not able to go to [place] to drop off urine sample.

## 2025-03-15 ENCOUNTER — LAB VISIT (OUTPATIENT)
Dept: LAB | Facility: HOSPITAL | Age: 60
End: 2025-03-15
Attending: NURSE PRACTITIONER
Payer: COMMERCIAL

## 2025-03-15 DIAGNOSIS — R39.9 UTI SYMPTOMS: ICD-10-CM

## 2025-03-15 LAB
BACTERIA #/AREA URNS HPF: NORMAL /HPF
BILIRUB UR QL STRIP: NEGATIVE
CLARITY UR: CLEAR
COLOR UR: YELLOW
GLUCOSE UR QL STRIP: NEGATIVE
HGB UR QL STRIP: ABNORMAL
KETONES UR QL STRIP: NEGATIVE
LEUKOCYTE ESTERASE UR QL STRIP: NEGATIVE
MICROSCOPIC COMMENT: NORMAL
NITRITE UR QL STRIP: NEGATIVE
PH UR STRIP: 6 [PH] (ref 5–8)
PROT UR QL STRIP: NEGATIVE
RBC #/AREA URNS HPF: 4 /HPF (ref 0–4)
SP GR UR STRIP: >=1.03 (ref 1–1.03)
URN SPEC COLLECT METH UR: ABNORMAL
WBC #/AREA URNS HPF: 2 /HPF (ref 0–5)

## 2025-03-15 PROCEDURE — 81000 URINALYSIS NONAUTO W/SCOPE: CPT | Mod: PO | Performed by: NURSE PRACTITIONER

## 2025-03-20 ENCOUNTER — TELEPHONE (OUTPATIENT)
Dept: FAMILY MEDICINE | Facility: CLINIC | Age: 60
End: 2025-03-20
Payer: COMMERCIAL

## 2025-03-20 NOTE — TELEPHONE ENCOUNTER
----- Message from Tanya sent at 3/20/2025  2:26 PM CDT -----  TRISHA CORONA calling regarding Results (message) for # Type: Needs Medical Advice Who Called:pt Best Call Back Number:[phone] Additional Information: Requesting a call back regarding pt is asking for the office to call her . Pt has questions regarding her urine testing. She has questions about the results. Please Advise- Thank you

## 2025-03-21 ENCOUNTER — TELEPHONE (OUTPATIENT)
Dept: FAMILY MEDICINE | Facility: CLINIC | Age: 60
End: 2025-03-21
Payer: COMMERCIAL

## 2025-03-21 NOTE — TELEPHONE ENCOUNTER
----- Message from Lea sent at 3/20/2025  8:28 AM CDT -----  Contact: PT  Type: Needs Medical Advice Who Called:John Paul Call Back Number:[phone] Information: Requesting a call back regarding  pt is asking for the office to call her . Pt has questions regarding her urine testing. She has questions about the results. Please Advise- Thank you

## 2025-03-21 NOTE — TELEPHONE ENCOUNTER
Her urine showed some blood but everything else was normal. It did not send for culture since WBC were normal. Might have a kidney stone. Last CT Scan done 2024 showed you had a stone in the right side. Hydrate well. Can take AZO for discomfort. If symptoms not improving, flank pain or trouble passing urine will need repeat CT Scan and referral to Urology.

## 2025-05-15 ENCOUNTER — OFFICE VISIT (OUTPATIENT)
Dept: GASTROENTEROLOGY | Facility: CLINIC | Age: 60
End: 2025-05-15
Payer: COMMERCIAL

## 2025-05-15 VITALS — HEIGHT: 64 IN | WEIGHT: 167.75 LBS | BODY MASS INDEX: 28.64 KG/M2

## 2025-05-15 DIAGNOSIS — K21.9 GASTROESOPHAGEAL REFLUX DISEASE WITHOUT ESOPHAGITIS: ICD-10-CM

## 2025-05-15 DIAGNOSIS — R10.13 EPIGASTRIC PAIN: Primary | ICD-10-CM

## 2025-05-15 DIAGNOSIS — Z86.0100 HISTORY OF COLON POLYPS: ICD-10-CM

## 2025-05-15 DIAGNOSIS — Z87.19 HISTORY OF IBS: ICD-10-CM

## 2025-05-15 DIAGNOSIS — K44.9 HIATAL HERNIA: ICD-10-CM

## 2025-05-15 PROCEDURE — 1160F RVW MEDS BY RX/DR IN RCRD: CPT | Mod: CPTII,S$GLB,,

## 2025-05-15 PROCEDURE — 99999 PR PBB SHADOW E&M-EST. PATIENT-LVL IV: CPT | Mod: PBBFAC,,,

## 2025-05-15 PROCEDURE — 3008F BODY MASS INDEX DOCD: CPT | Mod: CPTII,S$GLB,,

## 2025-05-15 PROCEDURE — 1159F MED LIST DOCD IN RCRD: CPT | Mod: CPTII,S$GLB,,

## 2025-05-15 PROCEDURE — 99214 OFFICE O/P EST MOD 30 MIN: CPT | Mod: S$GLB,,,

## 2025-05-15 RX ORDER — SUCRALFATE 1 G/1
1 TABLET ORAL
Qty: 40 TABLET | Refills: 0 | Status: SHIPPED | OUTPATIENT
Start: 2025-05-15 | End: 2025-05-25

## 2025-05-15 NOTE — PROGRESS NOTES
Subjective:       Patient ID: Lavern Hodges is a 59 y.o. female Body mass index is 28.8 kg/m².    Chief Complaint: Abdominal Pain    Established patient of Dr. Arevalo, Asya Azevedo, NP, and myself.     Gastroesophageal Reflux  She complains of abdominal pain (CC: denies pain currently - 3 episodes of epigastric pain that radiated to her back since EGD 02/06/25 - dull pain that lasts hours; took Tums p.r.n. with mild improvement) and belching (occasional). She reports no chest pain, no choking, no coughing, no dysphagia, no early satiety, no globus sensation, no heartburn, no hoarse voice, no nausea, no sore throat or no water brash. CHIEF COMPLAINT:  Patient expresses concern with intermittent and infrequent episodes of regurgitation especially at night; she is worried about possible aspiration; last episode occurred 07/12/2023 after eating a sandwich closer to bedtime than usual. This is a chronic problem. The current episode started more than 1 year ago. The problem occurs rarely. The problem has been gradually improving. The symptoms are aggravated by lying down, certain foods and stress (Greasy, spicy, fried foods, and red sauce; late night eating). Pertinent negatives include no anemia, fatigue, melena, muscle weakness or weight loss. Intermittent IBS - recently she started taking Bentyl p.r.n. again due to abdominal cramping/tightness she associated with IBS flare with improvement.  Currently having daily BMs rated 4 on [place] scale; does notices change in bowel frequency/constipation after eating milk or dairy products. Risk factors include hiatal hernia, caffeine use and NSAIDs (Drinks cokes and takes ibuprofen p.r.n.). She has tried head elevation, a PPI, a diet change, an antacid and a histamine-2 antagonist (Currently taking Voquezna 20 mg once daily and famotidine 40 mg nightly; past treatments: prilosec, protonix (ineffective), aciphex (mild relief), Dexilant, Tums, Nexium) for the symptoms. The  treatment provided significant relief. Past procedures include an abdominal ultrasound (07/10/19), an EGD (25 - Normal esophagus. Hiatal hernia. Gastritis. Biopsied. A few gastric polyps. Resected and retrieved. Normal examined duodenum; patho: benign, no bacteria), esophageal pH monitoring and a UGI. Past procedures do not include esophageal manometry or H. pylori antibody titer. Past invasive treatments do not include gastroplasty, gastroplication or reflux surgery.     Review of Systems   Constitutional:  Negative for activity change, appetite change, chills, diaphoresis, fatigue, fever, unexpected weight change and weight loss.   HENT:  Negative for hoarse voice, sore throat and trouble swallowing.    Respiratory:  Negative for cough, choking and shortness of breath.    Cardiovascular:  Negative for chest pain.   Gastrointestinal:  Positive for abdominal pain (CC: denies pain currently - 3 episodes of epigastric pain that radiated to her back since EGD 25 - dull pain that lasts hours; took Tums p.r.n. with mild improvement). Negative for abdominal distention, anal bleeding, blood in stool, constipation, diarrhea (Currently having 1-2 BMs daily rated 4 on [place] scale), dysphagia, heartburn, melena, nausea, rectal pain and vomiting.   Musculoskeletal:  Negative for muscle weakness.       Patient's last menstrual period was 2021 (approximate).  Past Medical History:   Diagnosis Date    Allergy     Anxiety     Asthma     due to allergies     GERD (gastroesophageal reflux disease)     Hepatic steatosis     History of nephrolithiasis     Irritable bowel syndrome     Kidney stone     lithotripsy 20 years ago    Peptic ulcer disease     PONV (postoperative nausea and vomiting)     Colin syndrome     Vitamin D deficiency      Past Surgical History:   Procedure Laterality Date     SECTION      times 2    COLONOSCOPY N/A 2016    Procedure: COLONOSCOPY;  Surgeon: Sherman Arevalo MD;   Location: [facility];  Service: Endoscopy;  Laterality: N/A; repeat in 10 years. clear.  internal/external hemorrhoids only.    COLONOSCOPY N/A 10/03/2024    Procedure: COLONOSCOPY;  Surgeon: Sherman Arevalo MD;  Location: [facility];  Service: Gastroenterology;  Laterality: N/A; Repeat colonoscopy in 5 years for surveillance    ESOPHAGEAL MANOMETRY WITH MEASUREMENT OF IMPEDANCE N/A 2023    Procedure: MANOMETRY, ESOPHAGUS, WITH IMPEDANCE MEASUREMENT;  Surgeon: Sol Ibrahim MD;  Location: [facility] (4TH FLR);  Service: Endoscopy;  Laterality: N/A;  instr portal-alex alvero-tb  11/10-lvm for precall-MS  -precall complete-KPvt    ESOPHAGOGASTRODUODENOSCOPY  2016    Dr. Arevalo: Medium-sized hiatus hernia. a few gastric polyps biopsied; biopsy: Fundic gland polyps, no dysplasia    ESOPHAGOGASTRODUODENOSCOPY N/A 2021    Procedure: EGD (ESOPHAGOGASTRODUODENOSCOPY);  Surgeon: Sherman Arevalo MD;  Location: [facility];  Service: Endoscopy;  Laterality: N/A;    ESOPHAGOGASTRODUODENOSCOPY N/A 2025    Procedure: EGD (ESOPHAGOGASTRODUODENOSCOPY);  Surgeon: Sherman Arevalo MD;  Location: [facility];  Service: Gastroenterology;  Laterality: N/A;    HYSTEROSCOPY      TUBAL LIGATION      UPPER GASTROINTESTINAL ENDOSCOPY  10/29/2010    Dr. Arevalo: mil schatzki ring- dilated, gastric polyp removed, otherwise normal findings. repeat PRN    WISDOM TOOTH EXTRACTION       Family History   Problem Relation Name Age of Onset    Ovarian cancer Maternal Aunt      Colon cancer Maternal Aunt          diagnosed in her 70's    Alzheimer's disease Maternal Grandmother      Crohn's disease Neg Hx      Ulcerative colitis Neg Hx      Esophageal cancer Neg Hx      Stomach cancer Neg Hx       Social History     Tobacco Use    Smoking status: Former     Current packs/day: 0.00     Types: Cigarettes     Quit date: 8/3/1994     Years since quittin.8     Passive exposure: Past    Smokeless tobacco:  Never   Substance Use Topics    Alcohol use: No    Drug use: No     Wt Readings from Last 10 Encounters:   05/15/25 76.1 kg (167 lb 12.3 oz)   02/06/25 76.5 kg (168 lb 10.4 oz)   12/30/24 76.8 kg (169 lb 5 oz)   12/13/24 76.9 kg (169 lb 8.5 oz)   11/19/24 77.7 kg (171 lb 4.8 oz)   10/03/24 76 kg (167 lb 8.8 oz)   08/14/24 76.6 kg (168 lb 14 oz)   08/11/24 74.4 kg (164 lb)   08/02/24 74.4 kg (164 lb)   07/27/24 74.4 kg (164 lb)     Lab Results   Component Value Date    WBC 4.08 12/30/2024    HGB 14.8 12/30/2024    HCT 44.9 12/30/2024    MCV 93 12/30/2024     12/30/2024     CMP  Sodium   Date Value Ref Range Status   12/30/2024 143 136 - 145 mmol/L Final     Potassium   Date Value Ref Range Status   12/30/2024 4.3 3.5 - 5.1 mmol/L Final     Chloride   Date Value Ref Range Status   12/30/2024 109 95 - 110 mmol/L Final     CO2   Date Value Ref Range Status   12/30/2024 24 23 - 29 mmol/L Final     Glucose   Date Value Ref Range Status   12/30/2024 94 70 - 110 mg/dL Final     BUN   Date Value Ref Range Status   12/30/2024 15 6 - 20 mg/dL Final     Creatinine   Date Value Ref Range Status   12/30/2024 1.0 0.5 - 1.4 mg/dL Final     Calcium   Date Value Ref Range Status   12/30/2024 9.5 8.7 - 10.5 mg/dL Final     Total Protein   Date Value Ref Range Status   12/30/2024 7.1 6.0 - 8.4 g/dL Final     Albumin   Date Value Ref Range Status   12/30/2024 4.1 3.5 - 5.2 g/dL Final     Total Bilirubin   Date Value Ref Range Status   12/30/2024 0.6 0.1 - 1.0 mg/dL Final     Comment:     For infants and newborns, interpretation of results should be based  on gestational age, weight and in agreement with clinical  observations.    Premature Infant recommended reference ranges:  Up to 24 hours.............<8.0 mg/dL  Up to 48 hours............<12.0 mg/dL  3-5 days..................<15.0 mg/dL  6-29 days.................<15.0 mg/dL       Alkaline Phosphatase   Date Value Ref Range Status   12/30/2024 69 40 - 150 U/L Final     AST    Date Value Ref Range Status   12/30/2024 16 10 - 40 U/L Final     ALT   Date Value Ref Range Status   12/30/2024 15 10 - 44 U/L Final     Anion Gap   Date Value Ref Range Status   12/30/2024 10 8 - 16 mmol/L Final     eGFR if    Date Value Ref Range Status   12/09/2021 >60.0 >60 mL/min/1.73 m^2 Final     eGFR if non    Date Value Ref Range Status   12/09/2021 >60.0 >60 mL/min/1.73 m^2 Final     Comment:     Calculation used to obtain the estimated glomerular filtration  rate (eGFR) is the CKD-EPI equation.        Lab Results   Component Value Date    TSH 1.841 12/30/2024     Reviewed prior medical records including office visit with Asya Azevedo NP 06/03/2021, radiology report of CT renal stone study 05/18/2024, esophagram 09/07/2023, KUB 06/23/2021, CT renal stone study 12/03/2022, chest x-ray 06/03/2021 & endoscopy history (see surgical history).    Objective:      Physical Exam  Vitals and nursing note reviewed.   Constitutional:       General: She is not in acute distress.     Appearance: Normal appearance. She is not ill-appearing.   HENT:      Mouth/Throat:      Lips: Pink. No lesions.   Cardiovascular:      Pulses: Normal pulses.      Heart sounds: Normal heart sounds.   Pulmonary:      Effort: Pulmonary effort is normal. No respiratory distress.      Breath sounds: Normal breath sounds.   Abdominal:      General: Bowel sounds are normal. There is no distension or abdominal bruit. There are no signs of injury.      Palpations: Abdomen is soft. There is no shifting dullness, fluid wave, hepatomegaly, splenomegaly or mass.      Tenderness: There is abdominal tenderness (mild) in the epigastric area. There is no guarding or rebound. Negative signs include Minor's sign, Rovsing's sign and McBurney's sign.   Skin:     General: Skin is warm and dry.      Coloration: Skin is not jaundiced or pale.   Neurological:      Mental Status: She is alert and oriented to person, place, and  time.   Psychiatric:         Attention and Perception: Attention normal.         Mood and Affect: Mood normal.         Speech: Speech normal.         Behavior: Behavior normal.         Assessment:       1. Epigastric pain    2. Gastroesophageal reflux disease without esophagitis    3. Hiatal hernia    4. History of IBS    5. History of colon polyps          Plan:       Epigastric pain  -Avoid known foods which trigger symptoms & to minimize/avoid high-fat foods, chocolate, caffeine, citrus, alcohol, & tomato products.  -Avoid/limit use of NSAID's, since they can cause GI upset, bleeding, and/or ulcers. If needed, take with food.  -     US Abdomen Complete; Future; Expected date: 05/15/2025  -     Lipase; Future; Expected date: 05/15/2025  -     Comprehensive Metabolic Panel; Future; Expected date: 05/15/2025  -     CBC Without Differential; Future; Expected date: 05/15/2025  - START: sucralfate (CARAFATE) 1 gram tablet; Take 1 tablet (1 g total) by mouth 4 (four) times daily before meals and nightly. for 10 days  Dispense: 40 tablet; Refill: 0    Gastroesophageal reflux disease without esophagitis  -Avoid large meals, avoid eating within 2-3 hours of bedtime (avoid late night eating & lying down soon after eating), elevate head of bed if nocturnal symptoms are present, smoking cessation (if current smoker), & weight loss (if overweight).   -Avoid known foods which trigger reflux symptoms & to minimize/avoid high-fat foods, chocolate, caffeine, citrus, alcohol, & tomato products.  -Avoid/limit use of NSAID's, since they can cause GI upset, bleeding, and/or ulcers. If needed, take with food.  - continue vocalized on 20 mg once daily  - continue famotidine 40 mg nightly  - START: sucralfate (CARAFATE) 1 gram tablet; Take 1 tablet (1 g total) by mouth 4 (four) times daily before meals and nightly. for 10 days  Dispense: 40 tablet; Refill: 0    Hiatal hernia  -discussed diagnosis with patient & that it is usually managed  by controlling reflux symptoms, surgery is an option, but usually performed if reflux is uncontrolled by medication management and lifestyle/dietary modifications; if symptoms persist despite medication management and lifestyle/dietary modifications, we can refer to general surgery to consult about surgical options, patient verbalized understanding    History of IBS  - continue Bentyl 10 mg 4 times daily p.r.n. as prescribed  - recommend OTC probiotic, such as Florastor or Culturelle, taken as directed on packaging  - avoid lactose, alcohol, & caffeine  - avoid known triggers  - continue Bentyl as prescribed  - consider low FODMAP diet    Screening for colon cancer  -follow-up for surveillance colonoscopy 11/2026    Follow up in about 2 months (around 7/15/2025), or if symptoms worsen or fail to improve.      If no improvement in symptoms or symptoms worsen, call/follow-up at clinic or go to ER.        30 minutes of total time spent on the encounter, which includes face to face time and non-face to face time preparing to see the patient (eg, review of tests), Obtaining and/or reviewing separately obtained history, Documenting clinical information in the electronic or other health record, Independently interpreting results (not separately reported) and communicating results to the patient/family/caregiver, or Care coordination (not separately reported).     A dictation software program was used for this note. Please expect some simple typographical  errors in this note.

## 2025-05-19 ENCOUNTER — RESULTS FOLLOW-UP (OUTPATIENT)
Dept: GASTROENTEROLOGY | Facility: CLINIC | Age: 60
End: 2025-05-19

## 2025-05-19 ENCOUNTER — TELEPHONE (OUTPATIENT)
Dept: SURGERY | Facility: CLINIC | Age: 60
End: 2025-05-19
Payer: COMMERCIAL

## 2025-05-19 ENCOUNTER — HOSPITAL ENCOUNTER (OUTPATIENT)
Dept: RADIOLOGY | Facility: HOSPITAL | Age: 60
Discharge: HOME | End: 2025-05-19
Payer: COMMERCIAL

## 2025-05-19 DIAGNOSIS — K80.20 GALLSTONES: Primary | ICD-10-CM

## 2025-05-19 DIAGNOSIS — K76.0 FATTY LIVER: ICD-10-CM

## 2025-05-19 DIAGNOSIS — R10.13 EPIGASTRIC PAIN: ICD-10-CM

## 2025-05-19 PROCEDURE — 76700 US EXAM ABDOM COMPLETE: CPT | Mod: 26,,, | Performed by: RADIOLOGY

## 2025-05-19 PROCEDURE — 76700 US EXAM ABDOM COMPLETE: CPT | Mod: TC,PO

## 2025-05-19 NOTE — TELEPHONE ENCOUNTER
----- Message from Nurse Sharma sent at 5/19/2025  4:35 PM CDT -----  Sent to wrong dept  ----- Message -----  From: Balwinder Sierra  Sent: 5/19/2025   4:27 PM CDT  To: Nate JAIMES Staff    Type:  Sooner Apoointment RequestCaller is requesting a sooner appointment.   Name of Caller:ptWhen is the first available appointment?sooner apptSymptoms: K80.20 (ICD-10-CM) - GallstonesWould the patient rather a call back or a response via [facility]? Call [facility] Call Back Number:[phone] Additional Information: pt st she woud like a call to be belen for an appt. Please call to discuss.

## 2025-05-19 NOTE — TELEPHONE ENCOUNTER
I called patient and scheduled her to see Dr. Preston on Tuesday, 5/20/25 @ 10:45am in [place] for Gallstones and a Hiatal hernia.  Anjum

## 2025-05-19 NOTE — TELEPHONE ENCOUNTER
----- Message from Balwinder sent at 5/19/2025  4:34 PM CDT -----  Type:  Sooner Apoointment RequestCaller is requesting a sooner appointment.   Name of Caller:ptKecia is the first available appointment?sooner apptSymptoms:K80.20 (ICD-10-CM) - GallstonesWould the patient rather a call back or a response via [facility]? Call [facility] Call Back Number:[phone] Additional Information: pt st she would like a call for next appt. Please call to discuss.

## 2025-05-20 ENCOUNTER — OFFICE VISIT (OUTPATIENT)
Dept: SURGERY | Facility: CLINIC | Age: 60
End: 2025-05-20
Payer: COMMERCIAL

## 2025-05-20 VITALS
HEART RATE: 87 BPM | WEIGHT: 165.56 LBS | TEMPERATURE: 98 F | DIASTOLIC BLOOD PRESSURE: 64 MMHG | HEIGHT: 64 IN | SYSTOLIC BLOOD PRESSURE: 119 MMHG | BODY MASS INDEX: 28.27 KG/M2

## 2025-05-20 DIAGNOSIS — K80.20 GALLSTONES: ICD-10-CM

## 2025-05-20 PROCEDURE — 3078F DIAST BP <80 MM HG: CPT | Mod: CPTII,S$GLB,, | Performed by: STUDENT IN AN ORGANIZED HEALTH CARE EDUCATION/TRAINING PROGRAM

## 2025-05-20 PROCEDURE — 3008F BODY MASS INDEX DOCD: CPT | Mod: CPTII,S$GLB,, | Performed by: STUDENT IN AN ORGANIZED HEALTH CARE EDUCATION/TRAINING PROGRAM

## 2025-05-20 PROCEDURE — 99999 PR PBB SHADOW E&M-EST. PATIENT-LVL IV: CPT | Mod: PBBFAC,,, | Performed by: STUDENT IN AN ORGANIZED HEALTH CARE EDUCATION/TRAINING PROGRAM

## 2025-05-20 PROCEDURE — 3074F SYST BP LT 130 MM HG: CPT | Mod: CPTII,S$GLB,, | Performed by: STUDENT IN AN ORGANIZED HEALTH CARE EDUCATION/TRAINING PROGRAM

## 2025-05-20 PROCEDURE — 1159F MED LIST DOCD IN RCRD: CPT | Mod: CPTII,S$GLB,, | Performed by: STUDENT IN AN ORGANIZED HEALTH CARE EDUCATION/TRAINING PROGRAM

## 2025-05-20 PROCEDURE — 99204 OFFICE O/P NEW MOD 45 MIN: CPT | Mod: S$GLB,,, | Performed by: STUDENT IN AN ORGANIZED HEALTH CARE EDUCATION/TRAINING PROGRAM

## 2025-05-20 NOTE — PROGRESS NOTES
History & Physical    Subjective     History of Present Illness:  Patient is a 59 y.o. female presents with intermittent abdominal pain and back pain.  This seems to be worse at night.  Symptoms are sporadic.  She also complains of abdominal bloating.  Patient has had a longstanding hiatal hernia with reflux which seems to be reasonably well controlled on medical therapy.  She was referred to me for gallstones.  No upper GI surgery.    Chief Complaint   Patient presents with    Consult     Gallstones/Hiatal hernia       Review of patient's allergies indicates:   Allergen Reactions    Cefaclor Hives    Codeine Hives       Current Medications[1]    Past Medical History:   Diagnosis Date    Allergy     Anxiety     Asthma     due to allergies     GERD (gastroesophageal reflux disease)     Hepatic steatosis     History of nephrolithiasis     Irritable bowel syndrome     Kidney stone     lithotripsy 20 years ago    Peptic ulcer disease     PONV (postoperative nausea and vomiting)     Colin syndrome     Vitamin D deficiency      Past Surgical History:   Procedure Laterality Date     SECTION      times 2    COLONOSCOPY N/A 2016    Procedure: COLONOSCOPY;  Surgeon: Sherman Arevalo MD;  Location: [facility];  Service: Endoscopy;  Laterality: N/A; repeat in 10 years. clear.  internal/external hemorrhoids only.    COLONOSCOPY N/A 10/03/2024    Procedure: COLONOSCOPY;  Surgeon: Sherman Arevalo MD;  Location: [facility];  Service: Gastroenterology;  Laterality: N/A; Repeat colonoscopy in 5 years for surveillance    ESOPHAGEAL MANOMETRY WITH MEASUREMENT OF IMPEDANCE N/A 2023    Procedure: MANOMETRY, ESOPHAGUS, WITH IMPEDANCE MEASUREMENT;  Surgeon: Sol Ibrahim MD;  [facility] ([address] [place] [address]);  Service: Endoscopy;  Laterality: N/A;  instr mandi freeman-tb  11/10-lvm for precall-MS  -precall complete-KPvt    ESOPHAGOGASTRODUODENOSCOPY  2016    Dr. Arevalo: Medium-sized  "hiatus hernia. a few gastric polyps biopsied; biopsy: Fundic gland polyps, no dysplasia    ESOPHAGOGASTRODUODENOSCOPY N/A 07/23/2021    Procedure: EGD (ESOPHAGOGASTRODUODENOSCOPY);  Surgeon: Sherman Arevalo MD;  Location: [facility];  Service: Endoscopy;  Laterality: N/A;    ESOPHAGOGASTRODUODENOSCOPY N/A 2/6/2025    Procedure: EGD (ESOPHAGOGASTRODUODENOSCOPY);  Surgeon: Sherman Arevalo MD;  Location: [facility] ENDO;  Service: Gastroenterology;  Laterality: N/A;    HYSTEROSCOPY      TUBAL LIGATION      UPPER GASTROINTESTINAL ENDOSCOPY  10/29/2010    Dr. Arevalo: mil schatzki ring- dilated, gastric polyp removed, otherwise normal findings. repeat PRN    WISDOM TOOTH EXTRACTION       Family History   Problem Relation Name Age of Onset    Ovarian cancer Maternal Aunt      Colon cancer Maternal Aunt          diagnosed in her 70's    Alzheimer's disease Maternal Grandmother      Crohn's disease Neg Hx      Ulcerative colitis Neg Hx      Esophageal cancer Neg Hx      Stomach cancer Neg Hx       Social History[2]     Review of Systems:  Review of Systems   Constitutional:  Negative for fever.   HENT: Negative.     Eyes: Negative.    Respiratory: Negative.     Cardiovascular: Negative.    Gastrointestinal:  Positive for abdominal distention and abdominal pain.   Endocrine: Negative.    Genitourinary: Negative.    Musculoskeletal: Negative.    Skin: Negative.    Allergic/Immunologic: Negative.    Neurological: Negative.    Hematological: Negative.    Psychiatric/Behavioral: Negative.            Objective     Vital Signs (Most Recent)  Temp: 97.6 °F (36.4 °C) (05/20/25 1053)  Pulse: 87 (05/20/25 1053)  BP: 119/64 (05/20/25 1053)  5' 4" (1.626 m)  75.1 kg (165 lb 9.1 oz)     Physical Exam:  Physical Exam  Constitutional:       General: She is not in acute distress.     Appearance: Normal appearance. She is not ill-appearing, toxic-appearing or diaphoretic.   HENT:      Head: Normocephalic.      Nose: Nose normal.   Eyes: "      Conjunctiva/sclera: Conjunctivae normal.   Cardiovascular:      Rate and Rhythm: Normal rate and regular rhythm.   Pulmonary:      Effort: Pulmonary effort is normal.   Abdominal:      Palpations: Abdomen is soft.   Musculoskeletal:         General: Normal range of motion.      Cervical back: Normal range of motion.   Skin:     General: Skin is warm.   Neurological:      General: No focal deficit present.      Mental Status: She is alert.   Psychiatric:         Mood and Affect: Mood normal.         Laboratory  Labs are normal    Diagnostic Results:  Ultrasound shows gallstones without secondary signs of cholecystitis  Prior CT scans shows a small to moderate hiatal hernia  EGD shows gastritis without mass and a hiatal hernia  Prior esophagram shows decrease esophageal motility  Esophageal manometry appears normal       Assessment and Plan   59-year-old female referred to me for gallstones.  Symptoms seem to be sporadic.  Unclear if they are related to the gallbladder or not.  We also discuss the possibility that they may be related to her hiatal hernia given more back and centralized abdominal pain.    PLAN:  Discuss that surgery is not a guarantee that symptoms will be alleviated.  We discussed the possibility of cholecystectomy or robotic hiatal hernia repair or both.  She is going to consider her options and will call the set up follow up with me as needed.  Should not need additional testing for either operation at this point in time.                [1]   Current Outpatient Medications   Medication Sig Dispense Refill    albuterol (PROVENTIL HFA) 90 mcg/actuation inhaler Inhale 2 puffs into the lungs every 6 (six) hours as needed for Wheezing. Rescue 18 g 2    albuterol-ipratropium (DUO-NEB) 2.5 mg-0.5 mg/3 mL nebulizer solution Take 3 mLs by nebulization every 6 (six) hours as needed for Wheezing. Rescue 75 mL 0    calcium carbonate (TUMS) 200 mg calcium (500 mg) chewable tablet Take 1 tablet by mouth  daily as needed for Heartburn.      cholecalciferol, vitamin D3, (VITAMIN D3) 25 mcg (1,000 unit) capsule 2,000 Units.      dicyclomine (BENTYL) 10 MG capsule Take 1 capsule (10 mg total) by mouth before meals and at bedtime as needed (abdominal cramping/pain). 120 capsule 0    famotidine (PEPCID) 40 MG tablet Take 1 tablet (40 mg total) by mouth every evening. 90 tablet 1    vonoprazan (VOQUEZNA) 20 mg Tab Take 1 tablet (20 mg) by mouth once daily. 30 tablet 6    chlorhexidine (PERIDEX) 0.12 % solution daily as needed. (Patient not taking: Reported on 2025)      nebulizer and compressor Ligia Nebulizer machine and kit. Use 4 times daily as needed for wheezing. (Patient not taking: Reported on 2025) 1 each 0    sucralfate (CARAFATE) 1 gram tablet Take 1 tablet (1 g total) by mouth 4 (four) times daily before meals and nightly. for 10 days (Patient not taking: Reported on 2025) 40 tablet 0     Current Facility-Administered Medications   Medication Dose Route Frequency Provider Last Rate Last Admin    COVID-19 (Moderna) 50 mcg/0.5 mL IM vaccine (>/= 13 yo) 0.5 mL  0.5 mL Intramuscular 1 time in Clinic/HOD Gerda Webb NP       [2]   Social History  Tobacco Use    Smoking status: Former     Current packs/day: 0.00     Types: Cigarettes     Quit date: 8/3/1994     Years since quittin.8     Passive exposure: Past    Smokeless tobacco: Never   Substance Use Topics    Alcohol use: No    Drug use: No

## 2025-05-22 ENCOUNTER — OFFICE VISIT (OUTPATIENT)
Dept: FAMILY MEDICINE | Facility: CLINIC | Age: 60
End: 2025-05-22
Payer: COMMERCIAL

## 2025-05-22 VITALS
HEIGHT: 64 IN | OXYGEN SATURATION: 100 % | SYSTOLIC BLOOD PRESSURE: 110 MMHG | RESPIRATION RATE: 16 BRPM | HEART RATE: 80 BPM | WEIGHT: 166 LBS | BODY MASS INDEX: 28.34 KG/M2 | DIASTOLIC BLOOD PRESSURE: 70 MMHG | TEMPERATURE: 98 F

## 2025-05-22 DIAGNOSIS — R10.11 RIGHT UPPER QUADRANT ABDOMINAL PAIN: Primary | ICD-10-CM

## 2025-05-22 DIAGNOSIS — K76.0 HEPATIC STEATOSIS: ICD-10-CM

## 2025-05-22 DIAGNOSIS — D72.819 LEUKOPENIA, UNSPECIFIED TYPE: ICD-10-CM

## 2025-05-22 DIAGNOSIS — Z28.39 INCOMPLETE IMMUNIZATION STATUS: ICD-10-CM

## 2025-05-22 DIAGNOSIS — K80.20 GALLSTONES: ICD-10-CM

## 2025-05-22 PROCEDURE — 1160F RVW MEDS BY RX/DR IN RCRD: CPT | Mod: CPTII,S$GLB,, | Performed by: NURSE PRACTITIONER

## 2025-05-22 PROCEDURE — 3008F BODY MASS INDEX DOCD: CPT | Mod: CPTII,S$GLB,, | Performed by: NURSE PRACTITIONER

## 2025-05-22 PROCEDURE — 1159F MED LIST DOCD IN RCRD: CPT | Mod: CPTII,S$GLB,, | Performed by: NURSE PRACTITIONER

## 2025-05-22 PROCEDURE — 3078F DIAST BP <80 MM HG: CPT | Mod: CPTII,S$GLB,, | Performed by: NURSE PRACTITIONER

## 2025-05-22 PROCEDURE — 3074F SYST BP LT 130 MM HG: CPT | Mod: CPTII,S$GLB,, | Performed by: NURSE PRACTITIONER

## 2025-05-22 PROCEDURE — 99214 OFFICE O/P EST MOD 30 MIN: CPT | Mod: S$GLB,,, | Performed by: NURSE PRACTITIONER

## 2025-05-22 RX ORDER — MULTIVITAMIN
1 TABLET ORAL DAILY
COMMUNITY

## 2025-05-22 NOTE — PROGRESS NOTES
Subjective:       Patient ID: Lavern Hodges is a 59 y.o. female.    Chief Complaint: Follow-up (Test results)    Follow-up  Pertinent negatives include no abdominal pain, arthralgias, chest pain, coughing, fatigue, fever, headaches, nausea or vomiting.     Here for follow up. She recently had GI work up done. GI providers note reviewed. She was noted to have gallstones and Hiatal hernia. She also has fatty liver. She was referred to General surgery and Hepatology.     General surgery note reviewed. Recommended to have Lap Marla and hernia repair. She has not decided if she wants to do that or not.    Diagnostic Results:  Ultrasound shows gallstones without secondary signs of cholecystitis  Prior CT scans shows a small to moderate hiatal hernia  EGD shows gastritis without mass and a hiatal hernia  Prior esophagram shows decrease esophageal motility  Esophageal manometry appears normal    Fib-4 score of 1.35 which is indeterminate.  States she was told by General surgery to hepatology referral was not needed.  Liver enzymes have been in normal range.      We discussed for test results and specialty recommendations.  She will follow up with General surgery after she decides what course of action to take.  She will continue current medications.  She will avoid fatty foods.    See ROS    The following portion of the patients history was reviewed and updated as appropriate: allergies, current medications, past medical and surgical history. Past social history and problem list reviewed. Family PMH and Past social history reviewed. Tobacco, Illicit drug use reviewed.      Review of patient's allergies indicates:   Allergen Reactions    Cefaclor Hives    Codeine Hives       Current Medications[1]    Past Medical History:   Diagnosis Date    Allergy     Anxiety     Asthma     due to allergies     GERD (gastroesophageal reflux disease)     Hepatic steatosis     History of nephrolithiasis     Irritable bowel syndrome     Kidney  stone     lithotripsy 20 years ago    Peptic ulcer disease     PONV (postoperative nausea and vomiting)     Colin syndrome     Vitamin D deficiency        Past Surgical History:   Procedure Laterality Date     SECTION      times 2    COLONOSCOPY N/A 2016    Procedure: COLONOSCOPY;  Surgeon: Sherman Arevalo MD;  Location: [facility];  Service: Endoscopy;  Laterality: N/A; repeat in 10 years. clear.  internal/external hemorrhoids only.    COLONOSCOPY N/A 10/03/2024    Procedure: COLONOSCOPY;  Surgeon: Sherman Arevalo MD;  Location: [facility];  Service: Gastroenterology;  Laterality: N/A; Repeat colonoscopy in 5 years for surveillance    ESOPHAGEAL MANOMETRY WITH MEASUREMENT OF IMPEDANCE N/A 2023    Procedure: MANOMETRY, ESOPHAGUS, WITH IMPEDANCE MEASUREMENT;  Surgeon: Sol Ibrahim MD;  Location: [facility] ([address] [place] [address]);  Service: Endoscopy;  Laterality: N/A;  instr Novato-alex alvero-tb  11/10-lvm for precall-MS  -precall complete-KPvt    ESOPHAGOGASTRODUODENOSCOPY  2016    Dr. Arevalo: Medium-sized hiatus hernia. a few gastric polyps biopsied; biopsy: Fundic gland polyps, no dysplasia    ESOPHAGOGASTRODUODENOSCOPY N/A 2021    Procedure: EGD (ESOPHAGOGASTRODUODENOSCOPY);  Surgeon: Sherman Arevalo MD;  Location: [facility];  Service: Endoscopy;  Laterality: N/A;    ESOPHAGOGASTRODUODENOSCOPY N/A 2025    Procedure: EGD (ESOPHAGOGASTRODUODENOSCOPY);  Surgeon: Sherman Arevalo MD;  Location: [facility];  Service: Gastroenterology;  Laterality: N/A;    HYSTEROSCOPY      TUBAL LIGATION      UPPER GASTROINTESTINAL ENDOSCOPY  10/29/2010    Dr. Arevalo: mil schatzki ring- dilated, gastric polyp removed, otherwise normal findings. repeat PRN    WISDOM TOOTH EXTRACTION         Social History[2]    Review of Systems   Constitutional:  Negative for fatigue and fever.   HENT: Negative.     Eyes:  Negative for visual disturbance.   Respiratory:  Negative for cough,  "chest tightness, shortness of breath and wheezing.    Cardiovascular:  Negative for chest pain, palpitations and leg swelling.   Gastrointestinal:  Negative for abdominal pain, blood in stool, diarrhea, nausea and vomiting.   Genitourinary: Negative.    Musculoskeletal:  Negative for arthralgias, back pain and gait problem.   Skin: Negative.    Neurological:  Negative for headaches.   Psychiatric/Behavioral:  Negative for dysphoric mood and sleep disturbance. The patient is not nervous/anxious.        Objective:      /70   Pulse 80   Temp 98.2 °F (36.8 °C) (Temporal)   Resp 16   Ht 5' 4" (1.626 m)   Wt 75.3 kg (166 lb 0.1 oz)   LMP 03/16/2021 (Approximate)   SpO2 100%   BMI 28.49 kg/m²      Physical Exam  Constitutional:       Appearance: Normal appearance.   HENT:      Head: Normocephalic.   Eyes:      Pupils: Pupils are equal, round, and reactive to light.   Neck:      Thyroid: No thyromegaly.      Vascular: No carotid bruit.   Cardiovascular:      Rate and Rhythm: Normal rate and regular rhythm.      Pulses: Normal pulses.      Heart sounds: Normal heart sounds. No murmur heard.  Pulmonary:      Effort: Pulmonary effort is normal.      Breath sounds: Normal breath sounds. No wheezing.   Musculoskeletal:         General: Normal range of motion.      Cervical back: Normal range of motion.      Comments: Gait normal.  strong, equal   Skin:     General: Skin is warm and dry.      Capillary Refill: Capillary refill takes less than 2 seconds.   Neurological:      General: No focal deficit present.      Mental Status: She is alert.   Psychiatric:         Attention and Perception: Attention and perception normal.         Mood and Affect: Mood and affect normal.         Speech: Speech normal.         Behavior: Behavior normal.         Assessment:       1. Right upper quadrant abdominal pain    2. Gallstones    3. Hepatic steatosis    4. Leukopenia, unspecified type    5. Incomplete immunization status  "       Plan:       Right upper quadrant abdominal pain: see above. Continue to follow with GI and general surgery    Gallstones:  She will follow up with General surgery she decides to have cholecystectomy    Hepatic steatosis:  Discussed precautions and preventative measures.  We will continue to monitor    Leukopenia, unspecified type: repeat lab  -     CBC Without Differential; Future; Expected date: 06/22/2025    Incomplete immunization status: she wants to confirm that she is immune.   -     Mumps, IgG Screen; Future; Expected date: 05/22/2025  -     Rubeola antibody IgG; Future; Expected date: 05/22/2025  -     Rubella antibody, IgG; Future; Expected date: 05/22/2025       Continue current medication  Take medications only as prescribed  Healthy diet, exercise  Adequate rest  Adequate hydration  Avoid allergens  Avoid excessive caffeine     Follow-up as scheduled    I spent a total of 35 minutes on the day of the visit. Over 50% spent in review and discussion of test results and recommendations     This includes face to face time with the patient, as well as non-face to face time preparing for and completing the visit (review of prior diagnostic testing and clinical notes, obtaining or reviewing history, documenting clinical information in the EMR, independently interpreting and communicating results to the patient/family and coordinating ongoing care).           [1]   Current Outpatient Medications:     albuterol (PROVENTIL HFA) 90 mcg/actuation inhaler, Inhale 2 puffs into the lungs every 6 (six) hours as needed for Wheezing. Rescue, Disp: 18 g, Rfl: 2    albuterol-ipratropium (DUO-NEB) 2.5 mg-0.5 mg/3 mL nebulizer solution, Take 3 mLs by nebulization every 6 (six) hours as needed for Wheezing. Rescue, Disp: 75 mL, Rfl: 0    calcium carbonate (TUMS) 200 mg calcium (500 mg) chewable tablet, Take 1 tablet by mouth daily as needed for Heartburn., Disp: , Rfl:     cholecalciferol, vitamin D3, (VITAMIN D3) 25 mcg  (1,000 unit) capsule, 2,000 Units., Disp: , Rfl:     dicyclomine (BENTYL) 10 MG capsule, Take 1 capsule (10 mg total) by mouth before meals and at bedtime as needed (abdominal cramping/pain)., Disp: 120 capsule, Rfl: 0    famotidine (PEPCID) 40 MG tablet, Take 1 tablet (40 mg total) by mouth every evening., Disp: 90 tablet, Rfl: 1    multivitamin (THERAGRAN) per tablet, Take 1 tablet by mouth once daily., Disp: , Rfl:     nebulizer and compressor Ligia, Nebulizer machine and kit. Use 4 times daily as needed for wheezing., Disp: 1 each, Rfl: 0    sucralfate (CARAFATE) 1 gram tablet, Take 1 tablet (1 g total) by mouth 4 (four) times daily before meals and nightly. for 10 days, Disp: 40 tablet, Rfl: 0    vonoprazan (VOQUEZNA) 20 mg Tab, Take 1 tablet (20 mg) by mouth once daily., Disp: 30 tablet, Rfl: 6    Current Facility-Administered Medications:     COVID-19 (Moderna) 50 mcg/0.5 mL IM vaccine (>/= 11 yo) 0.5 mL, 0.5 mL, Intramuscular, 1 time in Clinic/HOD, Gerda Webb NP  [2]   Social History  Socioeconomic History    Marital status:     Number of children: 2   Occupational History     Employer: [employer]   Tobacco Use    Smoking status: Former     Current packs/day: 0.00     Types: Cigarettes     Quit date: 8/3/1994     Years since quittin.8     Passive exposure: Past    Smokeless tobacco: Never   Substance and Sexual Activity    Alcohol use: No    Drug use: No    Sexual activity: Yes     Partners: Male     Birth control/protection: None, See Surgical Hx

## 2025-05-26 ENCOUNTER — TELEPHONE (OUTPATIENT)
Dept: HEPATOLOGY | Facility: CLINIC | Age: 60
End: 2025-05-26
Payer: COMMERCIAL

## 2025-05-26 NOTE — TELEPHONE ENCOUNTER
Arabella Sullivan NP ordered that patient be scheduled for a hepatology consult visit for fatty liver.  Attempt made to reach patient to setup a consult visit with PA Scheuermann.  I left a VM asking that she call hepatology back for scheduling.

## 2025-05-27 DIAGNOSIS — R12 HEARTBURN: ICD-10-CM

## 2025-05-27 DIAGNOSIS — K44.9 HIATAL HERNIA: ICD-10-CM

## 2025-05-28 RX ORDER — FAMOTIDINE 40 MG/1
40 TABLET, FILM COATED ORAL NIGHTLY
Qty: 90 TABLET | Refills: 1 | Status: SHIPPED | OUTPATIENT
Start: 2025-05-28

## 2025-05-28 NOTE — TELEPHONE ENCOUNTER
No response from patient.  Attempt made to reach her again to setup visit with PA Scheuermann.  I left a VM asking that she call back.

## 2025-05-30 ENCOUNTER — TELEPHONE (OUTPATIENT)
Dept: HEPATOLOGY | Facility: CLINIC | Age: 60
End: 2025-05-30
Payer: COMMERCIAL

## 2025-05-30 NOTE — TELEPHONE ENCOUNTER
"----- Message from Nish sent at 5/30/2025  1:25 PM CDT -----  Regarding: miss call  Consult/Advisory:  Name Of Caller: Self  Contact Preference?:[phone]  What is the nature of the call?: Returning call to Gi  Additional Notes:"Thank you for all that you do for our patients"  "

## 2025-05-30 NOTE — TELEPHONE ENCOUNTER
I returned pt's phone call to schedule appt for fatty liver.  No answer. Left message to call back.

## 2025-06-06 ENCOUNTER — E-VISIT (OUTPATIENT)
Dept: URGENT CARE | Facility: CLINIC | Age: 60
End: 2025-06-06
Payer: COMMERCIAL

## 2025-06-06 ENCOUNTER — TELEPHONE (OUTPATIENT)
Dept: FAMILY MEDICINE | Facility: CLINIC | Age: 60
End: 2025-06-06
Payer: COMMERCIAL

## 2025-06-06 DIAGNOSIS — B96.89 ACUTE BACTERIAL SINUSITIS: Primary | ICD-10-CM

## 2025-06-06 DIAGNOSIS — J01.90 ACUTE BACTERIAL SINUSITIS: Primary | ICD-10-CM

## 2025-06-06 DIAGNOSIS — R09.81 SINUS CONGESTION: Primary | ICD-10-CM

## 2025-06-09 ENCOUNTER — TELEPHONE (OUTPATIENT)
Dept: FAMILY MEDICINE | Facility: CLINIC | Age: 60
End: 2025-06-09
Payer: COMMERCIAL

## 2025-06-09 RX ORDER — AZITHROMYCIN 500 MG/1
500 TABLET, FILM COATED ORAL DAILY
Qty: 3 TABLET | Refills: 0 | Status: SHIPPED | OUTPATIENT
Start: 2025-06-09 | End: 2025-06-11 | Stop reason: CLARIF

## 2025-06-09 NOTE — TELEPHONE ENCOUNTER
Copied from CRM #[id number]. Topic: General Inquiry - Return Call  >> Jun 9, 2025 12:26 PM Amairani wrote:  Type:  Patient Returning Call    Who Called:  the patient  Who Left Message for Patient:  April  Does the patient know what this is regarding?:  yes   Best Call Back Number:  [phone]  Additional Information:  pt really needs to speak with the office.

## 2025-06-09 NOTE — TELEPHONE ENCOUNTER
Pt had e-visit today and was prescribed zithromax 500mg po qd x3 days.  She says she has a sensitive stomach and wants to know if you think the 500mg is too much or should she be prescribed something else? She has only ever taken the 250mg of zithromax.

## 2025-06-09 NOTE — PROGRESS NOTES
Patient ID: Lavern Hodges is a 60 y.o. female.    Chief Complaint: URI (Entered automatically based on patient selection in [employer].)    The patient initiated a request through [employer] on 6/6/2025 for evaluation and management with a chief complaint of URI (Entered automatically based on patient selection in [employer].)     I evaluated the questionnaire submission on 06/09/2025 .    [facility] Peq E-Visit Covid    6/9/2025  9:41 AM CDT - Filed by Patient   Do you agree to participate in an E-Visit? Yes   If you have any of the following symptoms, go to your local emergency room or call 911: I acknowledge   Choose the state of your primary residence [place]   What is the main issue you would like addressed today? Sinus congestion   Do you think you might have COVID-19 or the Flu? No   What symptoms do you currently have?  Cough;  Stuffy nose   Describe your cough: Contains mucus   Describe your mucus: Yellow;  Clear;  Thick   Have you ever smoked? Smoked in the past   Do you have a fever? No   When did your concern begin? 5/30/2025   In the last two weeks, have you been in close contact with someone who has COVID-19, the Flu, or strep throat? Not sure   What have you tried to help your symptoms? Cough syrup;  Drinking more fluids;  Hot shower;  Rest and sleep;  Other   List what you have done or taken to help your symptoms. Flonase   On a scale of 1-10, where 10 is the worst you can imagine, how severe are your symptoms? (range: 1 - 10) 5   How have your symptoms changed since they first started? No change   Do you have transportation to an [facility] location to get tested for COVID-19? Yes   Provide any additional information you feel is important.    Please attach any relevant images or files    Are you able to take your vital signs? No         Encounter Diagnosis   Name Primary?    Acute bacterial sinusitis Yes        No orders of the defined types were placed in this encounter.     Medications Ordered This Encounter    Medications    azithromycin (ZITHROMAX) 500 MG tablet     Sig: Take 1 tablet (500 mg total) by mouth once daily. for 3 days     Dispense:  3 tablet     Refill:  0        No follow-ups on file.      E-Visit Time Tracking:    Day 1 Time (in minutes): 5    Total Time (in minutes): 5

## 2025-06-09 NOTE — TELEPHONE ENCOUNTER
Copied from CRM #[id number]. Topic: General Inquiry - Return Call  >> Jun 6, 2025  3:51 PM Dejuan Daley wrote:  Type:  Patient Returning Call    Who Called:pt  Who Left Message for Patient:April B.    Does the patient know what this is regarding?:Yes    Would the patient rather a call back or a response via [facility]? Call back  Best Call Back Number:[phone]    Additional Information: Pt attempted to do the portal visit and prefers a phone call; please call back.

## 2025-06-11 RX ORDER — AZITHROMYCIN 250 MG/1
TABLET, FILM COATED ORAL
Qty: 6 TABLET | Refills: 0 | Status: SHIPPED | OUTPATIENT
Start: 2025-06-11 | End: 2025-06-16

## 2025-06-11 NOTE — TELEPHONE ENCOUNTER
Patient states that the zithromax 500 mg tablets always give her stomach pain and she is concerned about her gallbladder. Patient is requesting kyle change the prescription to the 250 mg tablets.   When she has gotten it in the past she just takes the 250 mg an extra day instead of the 500 mg on the first day.

## 2025-06-11 NOTE — TELEPHONE ENCOUNTER
Sent to the pharmacy.  She can always break the 500 mg half if possible and just take 1/2 tablet daily but I also sent in the regular pack for her.

## 2025-07-10 ENCOUNTER — LAB VISIT (OUTPATIENT)
Dept: LAB | Facility: HOSPITAL | Age: 60
End: 2025-07-10
Attending: NURSE PRACTITIONER
Payer: COMMERCIAL

## 2025-07-10 DIAGNOSIS — D72.819 LEUKOPENIA, UNSPECIFIED TYPE: ICD-10-CM

## 2025-07-10 DIAGNOSIS — Z28.39 INCOMPLETE IMMUNIZATION STATUS: ICD-10-CM

## 2025-07-10 LAB
ERYTHROCYTE [DISTWIDTH] IN BLOOD BY AUTOMATED COUNT: 13.1 % (ref 11.5–14.5)
HCT VFR BLD AUTO: 40.7 % (ref 37–48.5)
HGB BLD-MCNC: 14 GM/DL (ref 12–16)
MCH RBC QN AUTO: 30.8 PG (ref 27–31)
MCHC RBC AUTO-ENTMCNC: 34.4 G/DL (ref 32–36)
MCV RBC AUTO: 90 FL (ref 82–98)
PLATELET # BLD AUTO: 180 K/UL (ref 150–450)
PMV BLD AUTO: 10.1 FL (ref 9.2–12.9)
RBC # BLD AUTO: 4.55 M/UL (ref 4–5.4)
WBC # BLD AUTO: 4.4 K/UL (ref 3.9–12.7)

## 2025-07-10 PROCEDURE — 86765 RUBEOLA ANTIBODY: CPT

## 2025-07-10 PROCEDURE — 86735 MUMPS ANTIBODY: CPT

## 2025-07-10 PROCEDURE — 36415 COLL VENOUS BLD VENIPUNCTURE: CPT | Mod: PN

## 2025-07-10 PROCEDURE — 85027 COMPLETE CBC AUTOMATED: CPT

## 2025-07-10 PROCEDURE — 86762 RUBELLA ANTIBODY: CPT

## 2025-07-11 ENCOUNTER — OFFICE VISIT (OUTPATIENT)
Dept: FAMILY MEDICINE | Facility: CLINIC | Age: 60
End: 2025-07-11
Payer: COMMERCIAL

## 2025-07-11 ENCOUNTER — PATIENT MESSAGE (OUTPATIENT)
Dept: FAMILY MEDICINE | Facility: CLINIC | Age: 60
End: 2025-07-11

## 2025-07-11 VITALS
RESPIRATION RATE: 17 BRPM | DIASTOLIC BLOOD PRESSURE: 80 MMHG | HEIGHT: 64 IN | OXYGEN SATURATION: 98 % | BODY MASS INDEX: 27.21 KG/M2 | HEART RATE: 82 BPM | TEMPERATURE: 98 F | WEIGHT: 159.38 LBS | SYSTOLIC BLOOD PRESSURE: 130 MMHG

## 2025-07-11 DIAGNOSIS — F41.9 ANXIETY: ICD-10-CM

## 2025-07-11 DIAGNOSIS — K80.71 CALCULUS OF GALLBLADDER AND BILE DUCT WITH OBSTRUCTION WITHOUT CHOLECYSTITIS: Primary | ICD-10-CM

## 2025-07-11 LAB
MUMPS IGG (OHS): 109 AU/ML
MUMPS IGG INTERPRETATION (OHS): POSITIVE
RUBEOLA (MEASLES) IGG (OHS): >300 AU/ML
RUBEOLA IGG INTERP. (OHS): POSITIVE
RUBV IGG SER-ACNC: 6.5 IU/ML
RUBV IGG SER-IMP: NORMAL

## 2025-07-11 PROCEDURE — 3075F SYST BP GE 130 - 139MM HG: CPT | Mod: CPTII,S$GLB,, | Performed by: NURSE PRACTITIONER

## 2025-07-11 PROCEDURE — 3008F BODY MASS INDEX DOCD: CPT | Mod: CPTII,S$GLB,, | Performed by: NURSE PRACTITIONER

## 2025-07-11 PROCEDURE — 3079F DIAST BP 80-89 MM HG: CPT | Mod: CPTII,S$GLB,, | Performed by: NURSE PRACTITIONER

## 2025-07-11 PROCEDURE — 99213 OFFICE O/P EST LOW 20 MIN: CPT | Mod: S$GLB,,, | Performed by: NURSE PRACTITIONER

## 2025-07-11 NOTE — PROGRESS NOTES
Subjective:       Patient ID: Lavern Hodges is a 60 y.o. female.    Chief Complaint: Follow-up    Follow-up  Associated symptoms include abdominal pain and nausea. Pertinent negatives include no arthralgias, chest pain, coughing, fatigue, fever, headaches or vomiting.     Has some nausea and upper epigastric/RUQ abdominal pain last night. States she keeps having these episodes and the pain is intense. States when this happens nothing makes it better, she just has to wait for it to calm down. She would like to see General surgery for evaluation. She had abdominal US done 25:   Impression:     1. Mild hepatomegaly and hepatic steatosis  2. Cholelithiasis.    She is taking bentyl and acid reflux medication.    Scheduled for mammogram next month with her gynecology    Wants to wait on getting her second shingles vaccine    See ROS    The following portion of the patients history was reviewed and updated as appropriate: allergies, current medications, past medical and surgical history. Past social history and problem list reviewed. Family PMH and Past social history reviewed. Tobacco, Illicit drug use reviewed.      Review of patient's allergies indicates:   Allergen Reactions    Cefaclor Hives    Codeine Hives       Current Medications[1]    Past Medical History:   Diagnosis Date    Allergy     Anxiety     Asthma     due to allergies     GERD (gastroesophageal reflux disease)     Hepatic steatosis     History of nephrolithiasis     Irritable bowel syndrome     Kidney stone     lithotripsy 20 years ago    Peptic ulcer disease     PONV (postoperative nausea and vomiting)     Colin syndrome     Vitamin D deficiency        Past Surgical History:   Procedure Laterality Date     SECTION      times 2    COLONOSCOPY N/A 2016    Procedure: COLONOSCOPY;  Surgeon: Sherman Arevalo MD;  Location: [facility];  Service: Endoscopy;  Laterality: N/A; repeat in 10 years. clear.  internal/external hemorrhoids only.     COLONOSCOPY N/A 10/03/2024    Procedure: COLONOSCOPY;  Surgeon: Sherman Arevalo MD;  Location: [facility];  Service: Gastroenterology;  Laterality: N/A; Repeat colonoscopy in 5 years for surveillance    ESOPHAGEAL MANOMETRY WITH MEASUREMENT OF IMPEDANCE N/A 11/17/2023    Procedure: MANOMETRY, ESOPHAGUS, WITH IMPEDANCE MEASUREMENT;  Surgeon: Sol Ibrahim MD;  Location: [facility] (4TH FLR);  Service: Endoscopy;  Laterality: N/A;  instr portal-alex alvero-tb  11/10-lvm for precall-MS  11/14-precall complete-KPvt    ESOPHAGOGASTRODUODENOSCOPY  11/03/2016    Dr. Arevalo: Medium-sized hiatus hernia. a few gastric polyps biopsied; biopsy: Fundic gland polyps, no dysplasia    ESOPHAGOGASTRODUODENOSCOPY N/A 07/23/2021    Procedure: EGD (ESOPHAGOGASTRODUODENOSCOPY);  Surgeon: Sherman Arevalo MD;  Location: [facility];  Service: Endoscopy;  Laterality: N/A;    ESOPHAGOGASTRODUODENOSCOPY N/A 2/6/2025    Procedure: EGD (ESOPHAGOGASTRODUODENOSCOPY);  Surgeon: Sherman Arevalo MD;  Location: [facility];  Service: Gastroenterology;  Laterality: N/A;    HYSTEROSCOPY      TUBAL LIGATION      UPPER GASTROINTESTINAL ENDOSCOPY  10/29/2010    Dr. Arevalo: mil schatzki ring- dilated, gastric polyp removed, otherwise normal findings. repeat PRN    WISDOM TOOTH EXTRACTION         Social History[2]    Review of Systems   Constitutional:  Negative for fatigue and fever.   HENT: Negative.     Eyes:  Negative for visual disturbance.   Respiratory:  Negative for cough, chest tightness, shortness of breath and wheezing.    Cardiovascular:  Negative for chest pain, palpitations and leg swelling.   Gastrointestinal:  Positive for abdominal pain and nausea. Negative for blood in stool, constipation, diarrhea and vomiting.   Genitourinary: Negative.    Musculoskeletal:  Negative for arthralgias, back pain and gait problem.   Skin: Negative.    Neurological:  Negative for headaches.   Psychiatric/Behavioral:  Negative for  "dysphoric mood and sleep disturbance. The patient is nervous/anxious (about her health).        Objective:      /80 (BP Location: Right arm, Patient Position: Sitting)   Pulse 82   Temp 98.4 °F (36.9 °C) (Temporal)   Resp 17   Ht 5' 4" (1.626 m)   Wt 72.3 kg (159 lb 6.3 oz)   LMP 03/16/2021 (Approximate)   SpO2 98%   BMI 27.36 kg/m²      Physical Exam  Constitutional:       Appearance: Normal appearance.   HENT:      Head: Normocephalic.   Eyes:      Pupils: Pupils are equal, round, and reactive to light.   Neck:      Thyroid: No thyromegaly.      Vascular: No carotid bruit.   Cardiovascular:      Rate and Rhythm: Normal rate and regular rhythm.      Pulses: Normal pulses.      Heart sounds: Normal heart sounds. No murmur heard.  Pulmonary:      Effort: Pulmonary effort is normal.      Breath sounds: Normal breath sounds. No wheezing.   Abdominal:      General: Bowel sounds are normal.      Tenderness: There is abdominal tenderness in the right upper quadrant and epigastric area.   Musculoskeletal:         General: Normal range of motion.      Cervical back: Normal range of motion.      Right lower leg: No edema.      Left lower leg: No edema.      Comments: Gait normal.  strong, equal   Skin:     General: Skin is warm and dry.      Capillary Refill: Capillary refill takes less than 2 seconds.   Neurological:      General: No focal deficit present.      Mental Status: She is alert.   Psychiatric:         Attention and Perception: Attention and perception normal.         Mood and Affect: Mood and affect normal.         Speech: Speech normal.         Behavior: Behavior normal.         Assessment:       1. Calculus of gallbladder and bile duct with obstruction without cholecystitis    2. Anxiety        Plan:       Calculus of gallbladder and bile duct with obstruction without cholecystitis: refer to General surgery. Advised that her US did not show a blockage but will see what they recommend. Advised " bland diet: avoid spicy, fatty foods.  -     Ambulatory referral/consult to General Surgery; Future; Expected date: 07/18/2025    Anxiety: she feels she is fine without medication. She does not like taking a lot of medications.     Continue current medication  Take medications only as prescribed  Healthy diet, exercise  Adequate rest  Adequate hydration  Avoid allergens  Avoid excessive caffeine     Follow up 6 months         [1]   Current Outpatient Medications:     albuterol (PROVENTIL HFA) 90 mcg/actuation inhaler, Inhale 2 puffs into the lungs every 6 (six) hours as needed for Wheezing. Rescue, Disp: 18 g, Rfl: 2    calcium carbonate (TUMS) 200 mg calcium (500 mg) chewable tablet, Take 1 tablet by mouth daily as needed for Heartburn., Disp: , Rfl:     cholecalciferol, vitamin D3, (VITAMIN D3) 25 mcg (1,000 unit) capsule, 2,000 Units., Disp: , Rfl:     dicyclomine (BENTYL) 10 MG capsule, Take 1 capsule (10 mg total) by mouth before meals and at bedtime as needed (abdominal cramping/pain)., Disp: 120 capsule, Rfl: 0    famotidine (PEPCID) 40 MG tablet, TAKE 1 TABLET(40 MG) BY MOUTH EVERY EVENING, Disp: 90 tablet, Rfl: 1    multivitamin (THERAGRAN) per tablet, Take 1 tablet by mouth once daily., Disp: , Rfl:     vonoprazan (VOQUEZNA) 20 mg Tab, Take 1 tablet (20 mg) by mouth once daily., Disp: 30 tablet, Rfl: 6    albuterol-ipratropium (DUO-NEB) 2.5 mg-0.5 mg/3 mL nebulizer solution, Take 3 mLs by nebulization every 6 (six) hours as needed for Wheezing. Rescue, Disp: 75 mL, Rfl: 0    nebulizer and compressor Ligia, Nebulizer machine and kit. Use 4 times daily as needed for wheezing., Disp: 1 each, Rfl: 0    Current Facility-Administered Medications:     COVID-19 (Moderna) 50 mcg/0.5 mL IM vaccine (>/= 13 yo) 0.5 mL, 0.5 mL, Intramuscular, 1 time in Clinic/HOD, Gerda Webb NP  [2]   Social History  Socioeconomic History    Marital status:     Number of children: 2   Occupational History      Employer: [email or web address]   Tobacco Use    Smoking status: Former     Current packs/day: 0.00     Types: Cigarettes     Quit date: 8/3/1994     Years since quittin.9     Passive exposure: Past    Smokeless tobacco: Never   Substance and Sexual Activity    Alcohol use: No    Drug use: No    Sexual activity: Yes     Partners: Male     Birth control/protection: None, See Surgical Hx

## 2025-07-24 ENCOUNTER — TELEPHONE (OUTPATIENT)
Dept: FAMILY MEDICINE | Facility: CLINIC | Age: 60
End: 2025-07-24

## 2025-07-24 DIAGNOSIS — Z87.19 HISTORY OF IBS: ICD-10-CM

## 2025-07-24 DIAGNOSIS — R14.0 BLOATING SYMPTOM: ICD-10-CM

## 2025-07-24 NOTE — TELEPHONE ENCOUNTER
Pt states she is scheduled for surgery on 8/14/25 to get her gallbladder removed, but has a yeast infection and has diflucan that she was going to take.  She wants to make sure that this will not mess up her gallbladder prior to taking it.

## 2025-07-24 NOTE — TELEPHONE ENCOUNTER
Copied from CRM #[id number]. Topic: General Inquiry - Patient Advice  >> Jul 24, 2025  9:21 AM Tech Deamary wrote:  Type:  Needs Medical Advice    Who Called: pt      Would the patient rather a call back or a response via [facility]? Call   Best Call Back Number: [phone]  Additional Information: pt states that she would like to reschedule the appt for today 7/24/2025 for 10:40 to next Thursday 7/31/2025 10:40 can you give her a call back.

## 2025-07-25 RX ORDER — DICYCLOMINE HYDROCHLORIDE 10 MG/1
10 CAPSULE ORAL
Qty: 360 CAPSULE | Refills: 0 | Status: SHIPPED | OUTPATIENT
Start: 2025-07-25 | End: 2025-10-23

## 2025-07-31 ENCOUNTER — TELEPHONE (OUTPATIENT)
Dept: FAMILY MEDICINE | Facility: CLINIC | Age: 60
End: 2025-07-31

## 2025-07-31 ENCOUNTER — CLINICAL SUPPORT (OUTPATIENT)
Dept: FAMILY MEDICINE | Facility: CLINIC | Age: 60
End: 2025-07-31
Payer: COMMERCIAL

## 2025-07-31 ENCOUNTER — E-VISIT (OUTPATIENT)
Dept: FAMILY MEDICINE | Facility: CLINIC | Age: 60
End: 2025-07-31
Payer: COMMERCIAL

## 2025-07-31 DIAGNOSIS — Z23 IMMUNIZATION DUE: Primary | ICD-10-CM

## 2025-07-31 DIAGNOSIS — B37.31 VAGINAL CANDIDA: Primary | ICD-10-CM

## 2025-07-31 DIAGNOSIS — N89.8 VAGINAL IRRITATION: Primary | ICD-10-CM

## 2025-07-31 RX ORDER — FLUCONAZOLE 150 MG/1
150 TABLET ORAL DAILY
Qty: 3 TABLET | Refills: 0 | Status: SHIPPED | OUTPATIENT
Start: 2025-07-31 | End: 2025-08-03

## 2025-07-31 NOTE — PROGRESS NOTES
Patient ID: Lavern Hodges is a 60 y.o. female.        E-Visit Time Tracking:         Chief Complaint: Vaginal Discharge         The patient initiated a request through [email or web address] on 7/31/2025 for evaluation and management with a chief complaint of Vaginal Discharge     I evaluated the questionnaire submission on 07/31/2025.    [facility] Peq Evisit Vaginal Concerns    7/31/2025 12:11 PM CDT - Filed by Patient   Do you agree to participate in an E-Visit? Yes   If you have any of the following symptoms,  please do not complete an E-Visit,  schedule an appointment with your provider: I acknowledge   Choose the state of your primary residence [place]   What is the main issue you would like addressed today? Vaginal yeast infection   Which of the following vaginal concerns do you have? Itching;  Other   Describe your symptoms. Burning and irritation   Do you have vaginal discharge? No discharge   Do you have pain while passing urine? No   Do you have any of the following symptoms? None of the above    Have you taken antibiotics in the last two weeks? No    Do you use any of the following? No   Which of the following applies to your menstrual period? Have not had for a while   Which of the following applies to your menstrual cycle? No bleeding   Do you have spotting between periods? No   Do you have pain with your period? No   Have you had similar symptoms in the past? More than once   When you had similar symptoms in the past, did any of the following work? Pills for yeast infection   Have you had a temperature of 100.4 or higher? No   Provide any information you feel is important to your history not asked above Took Diflucan on 7/25/2025 with some improvement but symptoms returning.   Please attach any relevant images or files    Are you able to take your vitals? No         Encounter Diagnosis   Name Primary?    Vaginal candida Yes      Diagnoses and all orders for this visit:    Vaginal candida    Other orders  -     fluconazole  (DIFLUCAN) 150 MG Tab; Take 1 tablet (150 mg total) by mouth once daily. for 3 days        Follow up if not improving

## 2025-07-31 NOTE — TELEPHONE ENCOUNTER
Patient took a single dose of Diflucan last Friday and the symptoms got better but never fully resolved. Patient is still having vaginal irritation and would like to know what to do. Please advise.

## 2025-08-01 ENCOUNTER — TELEPHONE (OUTPATIENT)
Dept: FAMILY MEDICINE | Facility: CLINIC | Age: 60
End: 2025-08-01
Payer: COMMERCIAL

## 2025-08-01 DIAGNOSIS — R30.0 DYSURIA: Primary | ICD-10-CM

## 2025-08-01 NOTE — TELEPHONE ENCOUNTER
Copied from CRM #[id number]. Topic: General Inquiry - Patient Advice  >> Aug 1, 2025 12:52 PM Dejuan Daley wrote:  Type:  Needs Medical Advice    Who Called: pt    Symptoms (please be specific): Slight fever, body aches,questions      Pharmacy name and phone #:    [id number] - [facility] [address] [facility] [address]  [address] [place] [id number]  Phone: [phone] Fax: [phone]      Would the patient rather a call back or a response via [facility]? Call back    Best Call Back Number: [phone]    Additional Information: Pt reports slight fever and body aches; pt had Shingrix shot on 7/31; pt has questions f/u to yesterday's visit; requested call back from nurse

## 2025-08-01 NOTE — TELEPHONE ENCOUNTER
Pt did an evisit and was prescribed diflucan a few days ago. She did not start the medication due to receiving the shingles vaccine and not feeling that well after it. She is having burning while urinating, pressure in her pelvis area and frequency. She requested orders for a UA and Culture. Orders have been entered and appointment scheduled.

## 2025-08-02 ENCOUNTER — LAB VISIT (OUTPATIENT)
Dept: LAB | Facility: HOSPITAL | Age: 60
End: 2025-08-02
Attending: NURSE PRACTITIONER
Payer: COMMERCIAL

## 2025-08-02 DIAGNOSIS — R30.0 DYSURIA: ICD-10-CM

## 2025-08-02 LAB
BILIRUB UR QL STRIP.AUTO: NEGATIVE
CLARITY UR: CLEAR
COLOR UR AUTO: YELLOW
GLUCOSE UR QL STRIP: NEGATIVE
HGB UR QL STRIP: ABNORMAL
KETONES UR QL STRIP: NEGATIVE
LEUKOCYTE ESTERASE UR QL STRIP: NEGATIVE
NITRITE UR QL STRIP: NEGATIVE
PH UR STRIP: 7 [PH]
PROT UR QL STRIP: NEGATIVE
SP GR UR STRIP: <=1.005

## 2025-08-02 PROCEDURE — 81003 URINALYSIS AUTO W/O SCOPE: CPT | Mod: PO

## 2025-08-02 PROCEDURE — 87086 URINE CULTURE/COLONY COUNT: CPT

## 2025-08-04 ENCOUNTER — PATIENT MESSAGE (OUTPATIENT)
Dept: FAMILY MEDICINE | Facility: CLINIC | Age: 60
End: 2025-08-04
Payer: COMMERCIAL

## 2025-08-04 LAB — BACTERIA UR CULT: NORMAL

## 2025-08-05 ENCOUNTER — PATIENT MESSAGE (OUTPATIENT)
Dept: FAMILY MEDICINE | Facility: CLINIC | Age: 60
End: 2025-08-05
Payer: COMMERCIAL